# Patient Record
Sex: FEMALE | Race: WHITE | NOT HISPANIC OR LATINO | Employment: OTHER | URBAN - METROPOLITAN AREA
[De-identification: names, ages, dates, MRNs, and addresses within clinical notes are randomized per-mention and may not be internally consistent; named-entity substitution may affect disease eponyms.]

---

## 2017-01-24 ENCOUNTER — OFFICE VISIT (OUTPATIENT)
Dept: INTERNAL MEDICINE | Facility: CLINIC | Age: 62
End: 2017-01-24

## 2017-01-24 VITALS
SYSTOLIC BLOOD PRESSURE: 136 MMHG | BODY MASS INDEX: 23.3 KG/M2 | WEIGHT: 145 LBS | DIASTOLIC BLOOD PRESSURE: 82 MMHG | TEMPERATURE: 97.3 F | HEIGHT: 66 IN | RESPIRATION RATE: 16 BRPM | HEART RATE: 68 BPM

## 2017-01-24 DIAGNOSIS — R29.898 LEG WEAKNESS, BILATERAL: Primary | ICD-10-CM

## 2017-01-24 DIAGNOSIS — Z86.39 HISTORY OF HASHIMOTO THYROIDITIS: ICD-10-CM

## 2017-01-24 DIAGNOSIS — R20.2 NUMBNESS AND TINGLING IN LEFT HAND: ICD-10-CM

## 2017-01-24 DIAGNOSIS — R20.0 NUMBNESS AND TINGLING IN LEFT HAND: ICD-10-CM

## 2017-01-24 PROCEDURE — 99203 OFFICE O/P NEW LOW 30 MIN: CPT | Performed by: FAMILY MEDICINE

## 2017-01-24 RX ORDER — NAPROXEN SODIUM 220 MG
220 TABLET ORAL 2 TIMES DAILY PRN
COMMUNITY

## 2017-01-24 RX ORDER — MV-MIN/FOLIC/VIT K/LUT/HERB293 240-120MCG
TABLET ORAL DAILY
COMMUNITY
End: 2021-01-15

## 2017-01-24 RX ORDER — IBUPROFEN 200 MG
200 TABLET ORAL EVERY 6 HOURS PRN
COMMUNITY

## 2017-01-24 NOTE — PROGRESS NOTES
Subjective   Arin Lyons is a 61 y.o. female.     Chief Complaint   Patient presents with   • Extremity Weakness   • Numbness   • Osteoarthritis         History of Present Illness   Patient is a new patient who is on virtually no medicines for over-the-counter Aleve for osteoarthritis of the hands.  She is experienced over the past several months increasing feeling of weakness the legs when she gets out of a car and sometimes staggering but not vertigo.  She has had vertigo before and has not that sensation.Sensation when her legs to come along sometimes and not every day.  Having said that she actually jogs every morning with her  has no problem running.    Otherwise she's experienced some intermittent numbness and tingling in the left hand requiring her to shake her hand and this is random.  She's had no weakness in the hands.  She does have osteoarthritis of both hands and the DIP PIP joints.  She has been ripping up carpet during a home renovation project.    She'll stay care of her young grandson as well.    She has a GYN Dr. Pérez.    There is a history of Hashimoto's thyroiditis is a adolescent.      The following portions of the patient's history were reviewed and updated as appropriate: allergies, current medications, past social history and problem list.    Review of Systems   HENT: Negative.    Eyes: Negative.    Respiratory: Negative.    Cardiovascular: Negative.    Gastrointestinal: Negative.    Endocrine: Negative.    Genitourinary: Negative.    Musculoskeletal: Positive for gait problem.   Skin: Negative.    Allergic/Immunologic: Negative.    Neurological: Positive for weakness (legs).   Hematological: Negative.    Psychiatric/Behavioral: Negative.        Objective   Vitals:    01/24/17 0922   BP: 136/82   Pulse: 68   Resp: 16   Temp: 97.3 °F (36.3 °C)     Physical Exam   Constitutional: She is oriented to person, place, and time. She appears well-developed and well-nourished.   HENT:   Head:  Normocephalic and atraumatic.   Right Ear: Tympanic membrane and external ear normal.   Left Ear: Tympanic membrane and external ear normal.   Nose: Nose normal.   Mouth/Throat: Oropharynx is clear and moist.   Eyes: Conjunctivae and EOM are normal. Pupils are equal, round, and reactive to light.   Neck: Normal range of motion. Neck supple. No JVD present. No thyromegaly present.   Cardiovascular: Normal rate, regular rhythm, normal heart sounds and intact distal pulses.    Pulmonary/Chest: Effort normal and breath sounds normal.   Abdominal: Soft. Bowel sounds are normal.   Musculoskeletal: Normal range of motion.        Arms:  Lymphadenopathy:     She has no cervical adenopathy.   Neurological: She is alert and oriented to person, place, and time. No cranial nerve deficit. Coordination normal.   Skin: Skin is warm and dry. No rash noted.   Psychiatric: She has a normal mood and affect. Her behavior is normal. Judgment and thought content normal.   Vitals reviewed.      Assessment/Plan   Problem List Items Addressed This Visit     None      Visit Diagnoses     Leg weakness, bilateral    -  Primary    Relevant Orders    CBC & Differential    Comprehensive Metabolic Panel    Lipid Panel With / Chol / HDL Ratio    Vitamin B12    Folate RBC    TSH    T4, Free    T3, Free    Numbness and tingling in left hand        Relevant Orders    CBC & Differential    Comprehensive Metabolic Panel    Lipid Panel With / Chol / HDL Ratio    Vitamin B12    Folate RBC    TSH    T4, Free    T3, Free    History of Hashimoto thyroiditis        Relevant Orders    CBC & Differential    Comprehensive Metabolic Panel    Lipid Panel With / Chol / HDL Ratio    Vitamin B12    Folate RBC    TSH    T4, Free    T3, Free       plan: Screening labs including B12 folate TSH panel.  If these are normal watchful waiting or refer to neurology if symptoms progress

## 2017-01-24 NOTE — MR AVS SNAPSHOT
"Arin Lyons   1/24/2017 9:00 AM   Office Visit    Dept Phone:  [phone]   Encounter #:  [id number]    Provider:  Baljinder Carcamo Jr., MD   Department:  [facility] INTERNAL MEDICINE                Your Full Care Plan              Your Updated Medication List          This list is accurate as of: 1/24/17 10:18 AM.  Always use your most recent med list.                ALIVE WOMENS 50+ tablet       ibuprofen 200 MG tablet   Commonly known as:  ADVIL,MOTRIN       naproxen sodium 220 MG tablet   Commonly known as:  ALEVE               We Performed the Following     CBC & Differential     Comprehensive Metabolic Panel     Folate RBC     Lipid Panel With / Chol / HDL Ratio     T3, Free     T4, Free     TSH     Vitamin B12       You Were Diagnosed With        Codes Comments    Leg weakness, bilateral    -  Primary ICD-10-CM: M62.81  ICD-9-CM: 729.89     Numbness and tingling in left hand     ICD-10-CM: R20.2  ICD-9-CM: 782.0     History of Hashimoto thyroiditis     ICD-10-CM: Z86.39  ICD-9-CM: V12.29       Instructions     None    Patient Instructions History      Upcoming Appointments     Visit Type Date Time Department    NEW PATIENT 1/24/2017  9:00 AM [facility] 3 [id number]      [employer] Signup     Our records indicate that you have declined [facility] [employer] signup. If you would like to sign up for [employer], please email [email or web address] or call [phone] to obtain an activation code.             Other Info from Your Visit           Allergies     No Known Allergies      Reason for Visit     Extremity Weakness     Numbness     Osteoarthritis           Vital Signs     Blood Pressure Pulse Temperature Respirations Height Weight    136/82 (BP Location: Left arm, Patient Position: Sitting, Cuff Size: Adult) 68 97.3 °F (36.3 °C) (Tympanic) 16 66\" (167.6 cm) 145 lb (65.8 kg)    Body Mass Index Smoking Status                23.4 kg/m2 Former Smoker          Problems and " Diagnoses Noted     Leg weakness, bilateral    -  Primary    Hand paresthesia        History of Hashimoto thyroiditis

## 2017-01-25 LAB
ALBUMIN SERPL-MCNC: 4.9 G/DL (ref 3.5–5.2)
ALBUMIN/GLOB SERPL: 1.7 G/DL
ALP SERPL-CCNC: 70 U/L (ref 39–117)
ALT SERPL-CCNC: 15 U/L (ref 1–33)
AST SERPL-CCNC: 24 U/L (ref 1–32)
BASOPHILS # BLD AUTO: 0.05 10*3/MM3 (ref 0–0.2)
BASOPHILS NFR BLD AUTO: 0.9 % (ref 0–1.5)
BILIRUB SERPL-MCNC: 0.5 MG/DL (ref 0.1–1.2)
BUN SERPL-MCNC: 21 MG/DL (ref 8–23)
BUN/CREAT SERPL: 26.9 (ref 7–25)
CALCIUM SERPL-MCNC: 9.9 MG/DL (ref 8.6–10.5)
CHLORIDE SERPL-SCNC: 101 MMOL/L (ref 98–107)
CHOLEST SERPL-MCNC: 223 MG/DL (ref 0–200)
CHOLEST/HDLC SERPL: 2.48 {RATIO}
CO2 SERPL-SCNC: 25.6 MMOL/L (ref 22–29)
CREAT SERPL-MCNC: 0.78 MG/DL (ref 0.57–1)
EOSINOPHIL # BLD AUTO: 0.34 10*3/MM3 (ref 0–0.7)
EOSINOPHIL NFR BLD AUTO: 6 % (ref 0.3–6.2)
ERYTHROCYTE [DISTWIDTH] IN BLOOD BY AUTOMATED COUNT: 13.1 % (ref 11.7–13)
FOLATE BLD-MCNC: 507.7 NG/ML
FOLATE RBC-MCNC: 1183 NG/ML
GLOBULIN SER CALC-MCNC: 2.9 GM/DL
GLUCOSE SERPL-MCNC: 93 MG/DL (ref 65–99)
HCT VFR BLD AUTO: 42.9 % (ref 35.6–45.5)
HDLC SERPL-MCNC: 90 MG/DL (ref 40–60)
HGB BLD-MCNC: 13.5 G/DL (ref 11.9–15.5)
IMM GRANULOCYTES # BLD: 0 10*3/MM3 (ref 0–0.03)
IMM GRANULOCYTES NFR BLD: 0 % (ref 0–0.5)
LDLC SERPL CALC-MCNC: 116 MG/DL (ref 0–100)
LYMPHOCYTES # BLD AUTO: 1.57 10*3/MM3 (ref 0.9–4.8)
LYMPHOCYTES NFR BLD AUTO: 27.6 % (ref 19.6–45.3)
MCH RBC QN AUTO: 30.7 PG (ref 26.9–32)
MCHC RBC AUTO-ENTMCNC: 31.5 G/DL (ref 32.4–36.3)
MCV RBC AUTO: 97.5 FL (ref 80.5–98.2)
MONOCYTES # BLD AUTO: 0.42 10*3/MM3 (ref 0.2–1.2)
MONOCYTES NFR BLD AUTO: 7.4 % (ref 5–12)
NEUTROPHILS # BLD AUTO: 3.31 10*3/MM3 (ref 1.9–8.1)
NEUTROPHILS NFR BLD AUTO: 58.1 % (ref 42.7–76)
PLATELET # BLD AUTO: 298 10*3/MM3 (ref 140–500)
POTASSIUM SERPL-SCNC: 4.9 MMOL/L (ref 3.5–5.2)
PROT SERPL-MCNC: 7.8 G/DL (ref 6–8.5)
RBC # BLD AUTO: 4.4 10*6/MM3 (ref 3.9–5.2)
SODIUM SERPL-SCNC: 143 MMOL/L (ref 136–145)
T3FREE SERPL-MCNC: 3.3 PG/ML (ref 2–4.4)
T4 FREE SERPL-MCNC: 1.03 NG/DL (ref 0.93–1.7)
TRIGL SERPL-MCNC: 83 MG/DL (ref 0–150)
TSH SERPL DL<=0.005 MIU/L-ACNC: 2.5 MIU/ML (ref 0.27–4.2)
VIT B12 SERPL-MCNC: 365 PG/ML (ref 211–946)
VLDLC SERPL CALC-MCNC: 16.6 MG/DL (ref 5–40)
WBC # BLD AUTO: 5.69 10*3/MM3 (ref 4.5–10.7)

## 2017-02-02 ENCOUNTER — TELEPHONE (OUTPATIENT)
Dept: INTERNAL MEDICINE | Facility: CLINIC | Age: 62
End: 2017-02-02

## 2017-02-02 NOTE — TELEPHONE ENCOUNTER
Pt is calling for her lab results, please dictate a note to send to her or advise on what to tell the pt

## 2017-02-06 ENCOUNTER — TELEPHONE (OUTPATIENT)
Dept: INTERNAL MEDICINE | Facility: CLINIC | Age: 62
End: 2017-02-06

## 2017-02-07 NOTE — TELEPHONE ENCOUNTER
I apologize.  Her labs look good including thyroid panel.  Her cholesterol is a little high and I'll send a letter with the results.  Her folic acid and thyroid panel looks okay.

## 2017-04-25 ENCOUNTER — APPOINTMENT (OUTPATIENT)
Dept: WOMENS IMAGING | Facility: HOSPITAL | Age: 62
End: 2017-04-25

## 2017-04-25 PROCEDURE — 77067 SCR MAMMO BI INCL CAD: CPT | Performed by: RADIOLOGY

## 2017-04-25 PROCEDURE — G0202 SCR MAMMO BI INCL CAD: HCPCS | Performed by: RADIOLOGY

## 2017-06-09 ENCOUNTER — OFFICE VISIT (OUTPATIENT)
Dept: INTERNAL MEDICINE | Facility: CLINIC | Age: 62
End: 2017-06-09

## 2017-06-09 VITALS
WEIGHT: 144 LBS | OXYGEN SATURATION: 98 % | TEMPERATURE: 98.2 F | HEART RATE: 77 BPM | DIASTOLIC BLOOD PRESSURE: 88 MMHG | BODY MASS INDEX: 23.24 KG/M2 | SYSTOLIC BLOOD PRESSURE: 126 MMHG

## 2017-06-09 DIAGNOSIS — K11.1 ENLARGEMENT OF SUBMANDIBULAR GLAND: Primary | ICD-10-CM

## 2017-06-09 PROCEDURE — 99213 OFFICE O/P EST LOW 20 MIN: CPT | Performed by: FAMILY MEDICINE

## 2017-06-09 RX ORDER — AZITHROMYCIN 250 MG/1
TABLET, FILM COATED ORAL
Qty: 6 TABLET | Refills: 0 | Status: SHIPPED | OUTPATIENT
Start: 2017-06-09 | End: 2019-08-20

## 2017-06-09 NOTE — PROGRESS NOTES
Greg Lyons is a 62 y.o. female.     Chief Complaint   Patient presents with   • Neck Mass         History of Present Illness   For the past month patient stenosis swelling under the left submandibular area.  No other systemic symptoms.  She's had some sinus drainage throughout the spring.  No fever or chills.  No night sweats.  She has history of Hashimoto's thyroiditis.      The following portions of the patient's history were reviewed and updated as appropriate: allergies, current medications, past social history and problem list.    Review of Systems   Constitutional: Negative.    HENT: Positive for congestion.    Eyes: Negative.    Respiratory: Negative.    Cardiovascular: Negative.    Gastrointestinal: Negative.    Endocrine: Negative.    Genitourinary: Negative.    Musculoskeletal: Negative.    Skin: Negative.    Allergic/Immunologic: Negative.    Neurological: Negative.    Hematological: Negative.    Psychiatric/Behavioral: Negative.        Objective   Vitals:    06/09/17 1022   BP: 126/88   Pulse: 77   Temp: 98.2 °F (36.8 °C)   SpO2: 98%     Physical Exam   Constitutional: She is oriented to person, place, and time. She appears well-developed and well-nourished.   HENT:   Head: Normocephalic and atraumatic.   Right Ear: Tympanic membrane and external ear normal.   Left Ear: Tympanic membrane and external ear normal.   Nose: Nose normal.   Mouth/Throat: Oropharynx is clear and moist.   Eyes: Conjunctivae and EOM are normal. Pupils are equal, round, and reactive to light.   Neck: Normal range of motion. Neck supple. No JVD present. No thyromegaly present.       Cardiovascular: Normal rate, regular rhythm, normal heart sounds and intact distal pulses.    Pulmonary/Chest: Effort normal and breath sounds normal.   Abdominal: Soft. Bowel sounds are normal.   Musculoskeletal: Normal range of motion.   Lymphadenopathy:     She has no cervical adenopathy.   Neurological: She is alert and oriented to  person, place, and time. No cranial nerve deficit. Coordination normal.   Skin: Skin is warm and dry. No rash noted.   Psychiatric: She has a normal mood and affect. Her behavior is normal. Judgment and thought content normal.   Vitals reviewed.      Assessment/Plan   Problem List Items Addressed This Visit     None      Visit Diagnoses     Enlargement of submandibular gland    -  Primary    Relevant Orders    Ambulatory Referral to ENT (Otolaryngology)      Plan: Zithromax Z-PRACHI empirically referral to ENT for recheck.

## 2018-04-27 ENCOUNTER — APPOINTMENT (OUTPATIENT)
Dept: WOMENS IMAGING | Facility: HOSPITAL | Age: 63
End: 2018-04-27

## 2018-04-27 PROCEDURE — 77067 SCR MAMMO BI INCL CAD: CPT | Performed by: RADIOLOGY

## 2019-08-20 ENCOUNTER — RESULTS ENCOUNTER (OUTPATIENT)
Dept: FAMILY MEDICINE CLINIC | Facility: CLINIC | Age: 64
End: 2019-08-20

## 2019-08-20 ENCOUNTER — OFFICE VISIT (OUTPATIENT)
Dept: FAMILY MEDICINE CLINIC | Facility: CLINIC | Age: 64
End: 2019-08-20

## 2019-08-20 VITALS
SYSTOLIC BLOOD PRESSURE: 118 MMHG | HEIGHT: 66 IN | BODY MASS INDEX: 23.51 KG/M2 | WEIGHT: 146.3 LBS | HEART RATE: 62 BPM | OXYGEN SATURATION: 99 % | DIASTOLIC BLOOD PRESSURE: 68 MMHG | RESPIRATION RATE: 16 BRPM

## 2019-08-20 DIAGNOSIS — Z12.11 COLON CANCER SCREENING: ICD-10-CM

## 2019-08-20 DIAGNOSIS — M54.2 CERVICALGIA: ICD-10-CM

## 2019-08-20 DIAGNOSIS — Z23 NEED FOR VACCINATION: ICD-10-CM

## 2019-08-20 DIAGNOSIS — Z00.00 ANNUAL PHYSICAL EXAM: Primary | ICD-10-CM

## 2019-08-20 PROBLEM — I65.23 CAROTID STENOSIS, ASYMPTOMATIC, BILATERAL: Status: ACTIVE | Noted: 2019-08-20

## 2019-08-20 LAB
ERYTHROCYTE [DISTWIDTH] IN BLOOD BY AUTOMATED COUNT: 13.1 % (ref 12.3–15.4)
HCT VFR BLD AUTO: 42.7 % (ref 34–46.6)
HGB BLD-MCNC: 13.4 G/DL (ref 12–15.9)
MCH RBC QN AUTO: 30.7 PG (ref 26.6–33)
MCHC RBC AUTO-ENTMCNC: 31.4 G/DL (ref 31.5–35.7)
MCV RBC AUTO: 97.7 FL (ref 79–97)
PLATELET # BLD AUTO: 258 10*3/MM3 (ref 140–450)
RBC # BLD AUTO: 4.37 10*6/MM3 (ref 3.77–5.28)
TSH SERPL DL<=0.005 MIU/L-ACNC: 2.13 MIU/ML (ref 0.27–4.2)
WBC # BLD AUTO: 4.94 10*3/MM3 (ref 3.4–10.8)

## 2019-08-20 PROCEDURE — 99212 OFFICE O/P EST SF 10 MIN: CPT | Performed by: NURSE PRACTITIONER

## 2019-08-20 PROCEDURE — 90732 PPSV23 VACC 2 YRS+ SUBQ/IM: CPT | Performed by: NURSE PRACTITIONER

## 2019-08-20 PROCEDURE — 90472 IMMUNIZATION ADMIN EACH ADD: CPT | Performed by: NURSE PRACTITIONER

## 2019-08-20 PROCEDURE — 90715 TDAP VACCINE 7 YRS/> IM: CPT | Performed by: NURSE PRACTITIONER

## 2019-08-20 PROCEDURE — 90750 HZV VACC RECOMBINANT IM: CPT | Performed by: NURSE PRACTITIONER

## 2019-08-20 PROCEDURE — 90471 IMMUNIZATION ADMIN: CPT | Performed by: NURSE PRACTITIONER

## 2019-08-20 PROCEDURE — 99397 PER PM REEVAL EST PAT 65+ YR: CPT | Performed by: NURSE PRACTITIONER

## 2019-08-20 RX ORDER — CLOBETASOL PROPIONATE 0.5 MG/G
OINTMENT TOPICAL
Refills: 1 | COMMUNITY
Start: 2019-06-11 | End: 2020-08-31

## 2019-08-20 NOTE — PROGRESS NOTES
Preventive Exam    History of Present Illness: Arin Lyons is a 64 y.o. here for check up and review of routine health maintenance. she states she is doing well, but is concerned with neck pain.     Pt is being seen for neck pain at visit. She states that she was seen in [facility] in July and had a cervical neck x-ray that incidentally found carotid stenosis. She has since seen Dr. Daly, cardiologist for this. She reports that it hurts with turning her neck and is worse at night and in the AM when she wakes. She is taking aleve which helps with her neck pain.    Past medical history, surgical history and family history have been reviewed.     REVIEW OF SYSTEMS  Constitutional: Negative.    HENT: Negative.    Eyes: Negative.    Respiratory: Negative.    Cardiovascular: Negative.    Gastrointestinal: Negative.    Endocrine: Negative.    Genitourinary: Negative.    Musculoskeletal: Neck pain bilaterally.   Skin: Negative.    Allergic/Immunologic: Negative.    Neurological: Negative.    Hematological: Negative.    Psychiatric/Behavioral: Negative.      Review of Systems    PHYSICAL EXAM    Vitals:    08/20/19 0938   BP: 118/68   Pulse: 62   Resp: 16   SpO2: 99%         Physical Exam   Constitutional: She is oriented to person, place, and time. She appears well-developed and well-nourished.   HENT:   Head: Normocephalic and atraumatic.   Right Ear: External ear normal.   Left Ear: External ear normal.   Nose: Nose normal.   Mouth/Throat: Oropharynx is clear and moist.   Eyes: Conjunctivae and EOM are normal. Pupils are equal, round, and reactive to light.   Neck: Normal range of motion. Neck supple. No thyromegaly present.       Cardiovascular: Normal rate, regular rhythm, normal heart sounds and intact distal pulses.   No murmur heard.  Pulmonary/Chest: Effort normal and breath sounds normal.   Abdominal: Soft. Bowel sounds are normal. She exhibits no distension. There is no tenderness.   Musculoskeletal: Normal range of  motion. She exhibits no edema or deformity.   Lymphadenopathy:     She has no cervical adenopathy.   Neurological: She is alert and oriented to person, place, and time. No cranial nerve deficit.   Skin: Skin is warm and dry.   Psychiatric: She has a normal mood and affect. Her behavior is normal. Judgment and thought content normal.   Nursing note and vitals reviewed.      Procedures    Arin was seen today for annual exam.    Diagnoses and all orders for this visit:    Annual physical exam  -     CBC (No Diff)  -     Comprehensive Metabolic Panel  -     Lipid Panel With / Chol / HDL Ratio  -     TSH    Need for vaccination  -     Tdap Vaccine Greater Than or Equal To 6yo IM  -     Pneumococcal Polysaccharide Vaccine 23-Valent Greater Than or Equal To 3yo Subcutaneous / IM  -     Shingrix Vaccine    Colon cancer screening  -     Cologuard - Stool, Per Rectum; Future    Cervicalgia        Problems Addressed this Visit     None      Visit Diagnoses     Annual physical exam    -  Primary    Relevant Orders    CBC (No Diff)    Comprehensive Metabolic Panel    Lipid Panel With / Chol / HDL Ratio    TSH    Need for vaccination        Relevant Orders    Tdap Vaccine Greater Than or Equal To 6yo IM    Pneumococcal Polysaccharide Vaccine 23-Valent Greater Than or Equal To 3yo Subcutaneous / IM    Shingrix Vaccine    Colon cancer screening        Relevant Orders    Cologuard - Stool, Per Rectum    Cervicalgia     Continue Alleve as needed for pain.            Routine health maintenance reviewed and discussed with Arin Lyons.    Discussed importance of preventative care with patient. Counseled on diet and exercise.     Follow-up in 1 year for physical or as needed.

## 2019-08-21 LAB
ALBUMIN SERPL-MCNC: 4.5 G/DL (ref 3.5–5.2)
ALBUMIN/GLOB SERPL: 1.9 G/DL
ALP SERPL-CCNC: 66 U/L (ref 39–117)
ALT SERPL-CCNC: 14 U/L (ref 1–33)
AST SERPL-CCNC: 22 U/L (ref 1–32)
BILIRUB SERPL-MCNC: 0.5 MG/DL (ref 0.2–1.2)
BUN SERPL-MCNC: 14 MG/DL (ref 8–23)
BUN/CREAT SERPL: 21.9 (ref 7–25)
CALCIUM SERPL-MCNC: 9 MG/DL (ref 8.6–10.5)
CHLORIDE SERPL-SCNC: 102 MMOL/L (ref 98–107)
CHOLEST SERPL-MCNC: 205 MG/DL (ref 0–200)
CHOLEST/HDLC SERPL: 2.73 {RATIO}
CO2 SERPL-SCNC: 28.6 MMOL/L (ref 22–29)
CREAT SERPL-MCNC: 0.64 MG/DL (ref 0.57–1)
GLOBULIN SER CALC-MCNC: 2.4 GM/DL
GLUCOSE SERPL-MCNC: 86 MG/DL (ref 65–99)
HCV AB S/CO SERPL IA: <0.1 S/CO RATIO (ref 0–0.9)
HDLC SERPL-MCNC: 75 MG/DL (ref 40–60)
LDLC SERPL CALC-MCNC: 117 MG/DL (ref 0–100)
POTASSIUM SERPL-SCNC: 5.1 MMOL/L (ref 3.5–5.2)
PROT SERPL-MCNC: 6.9 G/DL (ref 6–8.5)
SODIUM SERPL-SCNC: 141 MMOL/L (ref 136–145)
TRIGL SERPL-MCNC: 63 MG/DL (ref 0–150)
VLDLC SERPL CALC-MCNC: 12.6 MG/DL

## 2019-09-05 ENCOUNTER — TELEPHONE (OUTPATIENT)
Dept: FAMILY MEDICINE CLINIC | Facility: CLINIC | Age: 64
End: 2019-09-05

## 2019-09-05 DIAGNOSIS — R19.5 POSITIVE COLORECTAL CANCER SCREENING USING COLOGUARD TEST: Primary | ICD-10-CM

## 2019-11-06 ENCOUNTER — AMBULATORY SURGICAL CENTER (AMBULATORY)
Dept: URBAN - METROPOLITAN AREA SURGERY 17 | Facility: SURGERY | Age: 64
End: 2019-11-06
Payer: COMMERCIAL

## 2019-11-06 VITALS
WEIGHT: 145 LBS | OXYGEN SATURATION: 98 % | DIASTOLIC BLOOD PRESSURE: 57 MMHG | OXYGEN SATURATION: 100 % | OXYGEN SATURATION: 97 % | SYSTOLIC BLOOD PRESSURE: 138 MMHG | DIASTOLIC BLOOD PRESSURE: 65 MMHG | DIASTOLIC BLOOD PRESSURE: 62 MMHG | DIASTOLIC BLOOD PRESSURE: 82 MMHG | SYSTOLIC BLOOD PRESSURE: 100 MMHG | DIASTOLIC BLOOD PRESSURE: 94 MMHG | SYSTOLIC BLOOD PRESSURE: 144 MMHG | HEART RATE: 71 BPM | RESPIRATION RATE: 21 BRPM | TEMPERATURE: 97.9 F | SYSTOLIC BLOOD PRESSURE: 118 MMHG | HEIGHT: 66 IN | RESPIRATION RATE: 16 BRPM | RESPIRATION RATE: 14 BRPM | DIASTOLIC BLOOD PRESSURE: 58 MMHG | RESPIRATION RATE: 18 BRPM | DIASTOLIC BLOOD PRESSURE: 77 MMHG | RESPIRATION RATE: 15 BRPM | DIASTOLIC BLOOD PRESSURE: 72 MMHG | HEART RATE: 77 BPM | HEART RATE: 66 BPM | SYSTOLIC BLOOD PRESSURE: 104 MMHG | SYSTOLIC BLOOD PRESSURE: 119 MMHG | HEART RATE: 75 BPM | OXYGEN SATURATION: 99 % | HEART RATE: 76 BPM | TEMPERATURE: 97.4 F | HEART RATE: 73 BPM | SYSTOLIC BLOOD PRESSURE: 108 MMHG | SYSTOLIC BLOOD PRESSURE: 131 MMHG | HEART RATE: 70 BPM | RESPIRATION RATE: 20 BRPM

## 2019-11-06 DIAGNOSIS — R19.5 OTHER FECAL ABNORMALITIES: ICD-10-CM

## 2019-11-06 DIAGNOSIS — Z12.11 ENCOUNTER FOR SCREENING FOR MALIGNANT NEOPLASM OF COLON: ICD-10-CM

## 2019-11-06 PROCEDURE — 45378 DIAGNOSTIC COLONOSCOPY: CPT | Performed by: INTERNAL MEDICINE

## 2019-11-22 ENCOUNTER — CLINICAL SUPPORT (OUTPATIENT)
Dept: FAMILY MEDICINE CLINIC | Facility: CLINIC | Age: 64
End: 2019-11-22

## 2019-11-22 DIAGNOSIS — Z23 NEED FOR VACCINATION: Primary | ICD-10-CM

## 2019-11-22 PROCEDURE — 90471 IMMUNIZATION ADMIN: CPT | Performed by: NURSE PRACTITIONER

## 2019-11-22 PROCEDURE — 90750 HZV VACC RECOMBINANT IM: CPT | Performed by: NURSE PRACTITIONER

## 2020-01-28 ENCOUNTER — APPOINTMENT (OUTPATIENT)
Dept: WOMENS IMAGING | Facility: HOSPITAL | Age: 65
End: 2020-01-28

## 2020-01-28 PROCEDURE — 77067 SCR MAMMO BI INCL CAD: CPT | Performed by: RADIOLOGY

## 2020-03-18 ENCOUNTER — APPOINTMENT (OUTPATIENT)
Dept: WOMENS IMAGING | Facility: HOSPITAL | Age: 65
End: 2020-03-18

## 2020-03-18 PROCEDURE — 76641 ULTRASOUND BREAST COMPLETE: CPT | Performed by: RADIOLOGY

## 2020-08-31 ENCOUNTER — OFFICE VISIT (OUTPATIENT)
Dept: ORTHOPEDIC SURGERY | Facility: CLINIC | Age: 65
End: 2020-08-31

## 2020-08-31 VITALS — TEMPERATURE: 98 F | BODY MASS INDEX: 23.78 KG/M2 | WEIGHT: 148 LBS | HEIGHT: 66 IN

## 2020-08-31 DIAGNOSIS — R52 PAIN: Primary | ICD-10-CM

## 2020-08-31 DIAGNOSIS — S92.334A CLOSED NONDISPLACED FRACTURE OF THIRD METATARSAL BONE OF RIGHT FOOT, INITIAL ENCOUNTER: ICD-10-CM

## 2020-08-31 PROCEDURE — 99203 OFFICE O/P NEW LOW 30 MIN: CPT | Performed by: NURSE PRACTITIONER

## 2020-08-31 PROCEDURE — 73630 X-RAY EXAM OF FOOT: CPT | Performed by: NURSE PRACTITIONER

## 2020-08-31 NOTE — PROGRESS NOTES
Patient Name: Arin Lyons   YOB: 1955  Referring Primary Care Physician: Kylee Sanchez APRN  BMI: Body mass index is 23.9 kg/m².    Chief Complaint:    Chief Complaint   Patient presents with   • Right Foot - Pain, Initial Evaluation        HPI: New patient to me she has neighbors of Candace Finley comes in for right foot pain.  She thinks around July 12 she had pain at the top of her foot thought it was a truck stress fracture she has had these in the past.  She denies any history of osteopenia osteoporosis but does not know she has been tested she runs about 20 miles a week and continues to have pain at the top of her foot ongoing for 2 months she is switched over to a postop shoe and has avoided running but has been walking and not stopped exercising presents today with concerns regarding the persistent pain.  Mask were worn by everyone for the duration of the visit she denies any acute trauma or injury to the foot.    Arin Lyons is a 65 y.o. female who presents today for evaluation of   Chief Complaint   Patient presents with   • Right Foot - Pain, Initial Evaluation       This problem is new to this examiner.     Subjective   Medications:   Home Medications:  Current Outpatient Medications on File Prior to Visit   Medication Sig   • ibuprofen (ADVIL,MOTRIN) 200 MG tablet Take 200 mg by mouth Every 6 (Six) Hours As Needed for mild pain (1-3).   • Multiple Vitamins-Minerals (ALIVE WOMENS 50+) tablet Take  by mouth Daily.   • naproxen sodium (ALEVE) 220 MG tablet Take 220 mg by mouth 2 (Two) Times a Day As Needed for mild pain (1-3).   • [DISCONTINUED] clobetasol (TEMOVATE) 0.05 % ointment APPLY A THIN LAYER TOPICALLY TO AFFECTED AREA TWICE DAILY     No current facility-administered medications on file prior to visit.      Current Medications:  Scheduled Meds:  Continuous Infusions:  No current facility-administered medications for this visit.   PRN Meds:.    I have reviewed the patient's  "medical history in detail and updated the computerized patient record.  Review and summarization of old records includes:    No past medical history on file.     Past Surgical History:   Procedure Laterality Date   • SKIN CANCER EXCISION Right 1990        Social History     Occupational History   • Not on file   Tobacco Use   • Smoking status: Former Smoker   • Smokeless tobacco: Never Used   Substance and Sexual Activity   • Alcohol use: Yes     Comment: 6 a week   • Drug use: No   • Sexual activity: Yes     Partners: Male      Social History     Social History Narrative    Lives with . Two grown daughters.         Family History   Problem Relation Age of Onset   • Hypertension Mother    • Depression Mother    • Stroke Father    • Rheum arthritis Maternal Aunt    • Heart failure Maternal Grandmother    • Thyroid disease Maternal Grandmother    • Autoimmune disease Sister    • Alzheimer's disease Sister        ROS: 14 point review of systems was performed and all other systems were reviewed and are negative except for documented findings in HPI and today's encounter.     Allergies: No Known Allergies  Constitutional:  Denies fever, shaking or chills   Eyes:  Denies change in visual acuity   HENT:  Denies nasal congestion or sore throat   Respiratory:  Denies cough or shortness of breath   Cardiovascular:  Denies chest pain or severe LE edema   GI:  Denies abdominal pain, nausea, vomiting, bloody stools or diarrhea   Musculoskeletal:  Numbness, tingling, pain, or loss of motor function only as noted above in history of present illness.  : Denies painful urination or hematuria  Integument:  Denies rash, lesion or ulceration   Neurologic:  Denies headache or focal weakness  Endocrine:  Denies lymphadenopathy  Psych:  Denies confusion or change in mental status   Hem:  Denies active bleeding    OBJECTIVE:  Physical Exam:   Temp 98 °F (36.7 °C) (Temporal)   Ht 167.6 cm (65.98\")   Wt 67.1 kg (148 lb)   BMI " 23.90 kg/m²     General Appearance:    Alert, cooperative, in no acute distress                  Eyes: conjunctiva clear  ENT: external ears and nose atraumatic  CV: no peripheral edema  Resp: normal respiratory effort  Skin: no rashes or wounds; normal turgor  Psych: mood and affect appropriate  Lymph: no nodes appreciated  Neuro: gross sensation intact  Vascular:  Palpable peripheral pulse in noted extremity  Musculoskeletal Extremities: Skin is warm and dry intact with good pulses mood and sensation she is point tender over the top of her foot at the third metatarsal bone ankles nontender Achilles intact there is no ecchymosis slight effusion noted she ambulates with a slight antalgic gait.    Radiology:   Right foot x-rays 2 views were done for acute pain that show healing fracture of the third metatarsal with some bony osseous changes questionable date and age no comparison films readily available    Assessment:     ICD-10-CM ICD-9-CM   1. Pain R52 780.96   2. Closed nondisplaced fracture of third metatarsal bone of right foot, initial encounter S92.334A 825.25        Procedures  With her history of stress fractures in the past and unknown certainty of how this happened and continued pain for 6 to 7 weeks and like to obtain an MRI to further evaluate her foot to see if this could possibly be a nonunion  Fitted with a short fracture walker boot and will get an MRI  Plan: Biomechanics of pertinent body area discussed.  Risks, benefits, alternatives, comparisons, and complications of accepted medicines, injections, recommendations, surgical procedures, and therapies explained and education provided in laymen's terms. Natural history and expected course of this patient's diagnosis discussed along with evaluation of therapies. Questions answered. When appropriate I also discussed proper use of cane, walker, trekking poles.   MEDICATIONS:  Prescription, OTC and Monitoring of Medications per orders to address ortho  complaints; Evaluation and discussion of safety, precautions, side effects, and warnings given especially of long term NSAID or steroid therapy.    RICE: Rest, ice, compression, and elevation therapy, Cryotherapy/brachy therapy, and or OTC linaments as indicated with instructions.       8/31/2020    Much of this encounter note is an electronic transcription/translation of spoken language to printed text. The electronic translation of spoken language may permit erroneous, or at times, nonsensical words or phrases to be inadvertently transcribed; Although I have reviewed the note for such errors, some may still exist

## 2020-09-14 ENCOUNTER — TELEPHONE (OUTPATIENT)
Dept: ORTHOPEDIC SURGERY | Facility: CLINIC | Age: 65
End: 2020-09-14

## 2020-09-14 NOTE — TELEPHONE ENCOUNTER
Patient called back and was informed of her results; she was transferred to scheduling for an appt with RUBEN.

## 2020-09-14 NOTE — TELEPHONE ENCOUNTER
Spoke to patient & scheduled her to see MWM next Thurs 9/24 (after patient returns from out of town) for IHC per [facility] / RIGHT Foot 2nd-3rd MT FX / pain since 7/12/20 / XR 8/31, MRI 9/09/20 ([facility]). Patient verbalized understanding.

## 2020-09-14 NOTE — TELEPHONE ENCOUNTER
----- Message from NARCISA Lunsford sent at 9/14/2020  1:16 PM EDT -----  Pt has 3rd metatarsal fracture - and second metatarsal stress fracture - needs to stay off of her foot in boot and follow with MWM - I think she has a knee scooter and needs to use to stay off. cim

## 2020-09-24 ENCOUNTER — CONSULT (OUTPATIENT)
Dept: ORTHOPEDIC SURGERY | Facility: CLINIC | Age: 65
End: 2020-09-24

## 2020-09-24 ENCOUNTER — TELEPHONE (OUTPATIENT)
Dept: ORTHOPEDIC SURGERY | Facility: CLINIC | Age: 65
End: 2020-09-24

## 2020-09-24 ENCOUNTER — LAB (OUTPATIENT)
Dept: LAB | Facility: HOSPITAL | Age: 65
End: 2020-09-24

## 2020-09-24 VITALS — TEMPERATURE: 97 F | HEIGHT: 67 IN | WEIGHT: 150 LBS | BODY MASS INDEX: 23.54 KG/M2

## 2020-09-24 DIAGNOSIS — S92.334A CLOSED NONDISPLACED FRACTURE OF THIRD METATARSAL BONE OF RIGHT FOOT, INITIAL ENCOUNTER: ICD-10-CM

## 2020-09-24 DIAGNOSIS — M19.079 ARTHRITIS OF FOOT: ICD-10-CM

## 2020-09-24 DIAGNOSIS — E55.9 VITAMIN D DEFICIENCY: Primary | ICD-10-CM

## 2020-09-24 DIAGNOSIS — S92.324A CLOSED NONDISPLACED FRACTURE OF SECOND METATARSAL BONE OF RIGHT FOOT, INITIAL ENCOUNTER: ICD-10-CM

## 2020-09-24 DIAGNOSIS — E55.9 VITAMIN D DEFICIENCY: ICD-10-CM

## 2020-09-24 LAB — 25(OH)D3 SERPL-MCNC: 34.9 NG/ML (ref 30–100)

## 2020-09-24 PROCEDURE — 36415 COLL VENOUS BLD VENIPUNCTURE: CPT

## 2020-09-24 PROCEDURE — 73630 X-RAY EXAM OF FOOT: CPT | Performed by: ORTHOPAEDIC SURGERY

## 2020-09-24 PROCEDURE — 82306 VITAMIN D 25 HYDROXY: CPT

## 2020-09-24 PROCEDURE — 99214 OFFICE O/P EST MOD 30 MIN: CPT | Performed by: ORTHOPAEDIC SURGERY

## 2020-09-24 NOTE — TELEPHONE ENCOUNTER
----- Message from Allan Glaser MD sent at 9/24/2020 11:14 AM EDT -----  Please make sure she is taking at least 5000 units of vitamin D daily and recheck in 4 weeks.

## 2020-09-24 NOTE — TELEPHONE ENCOUNTER
Patient returned my call.  We discussed her vitamin D level.  She is taking a multivitamin that has some vitamin D.  Have advised her that she will need to begin taking vitamin D3 5000 units every day and will recheck her vitamin D level in 1 month per Dr. Glaser

## 2020-09-24 NOTE — PROGRESS NOTES
"Foot Follow Up      Patient: Arin Lyons    YOB: 1955 65 y.o. female    Chief Complaints: Foot hurts    History of Present Illness: Patient had onset of right foot pain in mid July.  She been running up to 20 miles a week prior to that.    She began having some pain in her foot that felt like a stress fracture she has had these previously.  She quit running and began doing some walking with continued pain and got herself back into a postoperative shoe that she already had.    She had persistent complaints of pain and saw Cheryle on 8/31/2020 and was fitted with a boot and is only been using a cane intermittently since then and was sent for MRI.    She is seen today with some improvement in her foot pain with mild intermittent aching pain in the right forefoot.    She does take a women's multivitamin but does not take any supplemental calcium or vitamin D.            HPI    ROS: Foot pain activity change, ringing in the ears, neck pain, joint and back pain, dizziness  History reviewed. No pertinent past medical history.  Physical Exam:   Vitals:    09/24/20 0833   Temp: 97 °F (36.1 °C)   TempSrc: Temporal   Weight: 68 kg (150 lb)   Height: 168.9 cm (66.5\")   PainSc:   2   PainLoc: Foot     Well developed with normal mood.  Right foot shows slight swelling no warmth erythema there is mild discomfort to palpation on the second and third metatarsals and minimal if any discomfort of the dorsum of the midfoot      Radiology: 3 views of the right foot ordered today to evaluate for fractures reviewed and compared with x-rays from 8/31/2020 there are nondisplaced fractures of the second and third metatarsal necks proximal to the head without appreciable angulation or translation.  There is osteopenia and compared with prior x-rays there is more evident second metatarsal fracture than was previous Delmer noted.    MRI report of the right foot dated 9/8/2020 from [facility] shows nondisplaced transverse oblique " fracture of the distal shaft of the third metatarsal with marrow edema and also a nondisplaced incomplete stress fracture of the second metatarsal.  Otherwise there is no bony abnormality or marrow signal noted Lisfranc ligament is intact there is mild subcutaneous edema.      Assessment/Plan: 1.  Right second and third metatarsal stress fractures  2.  Right midfoot arthritis    Reviewed treatment options with her today and given the alignment of these and her improvement in pain I would not recommend any surgical treatment at this time but she understands that should these fail to heal or displace the could require surgical treatment.    She will continue with her boot and I recommend that she offload this with a cane which she already has and she will continue to limit activities to that of daily living only.    We need to get a vitamin D level checked on her as this may be low and could need to be augmented to help with assist in healing these fractures.    If anything worsens she will let me know otherwise I will see her back in 3 weeks x-ray of her right foot.    Recommend the patient see primary care provider for issues outlined in review of symptoms not pertinent to current orthopedic complaints

## 2020-10-14 ENCOUNTER — OFFICE VISIT (OUTPATIENT)
Dept: ORTHOPEDIC SURGERY | Facility: CLINIC | Age: 65
End: 2020-10-14

## 2020-10-14 VITALS — BODY MASS INDEX: 22.76 KG/M2 | HEIGHT: 67 IN | TEMPERATURE: 96.4 F | WEIGHT: 145 LBS

## 2020-10-14 DIAGNOSIS — S92.334D CLOSED NONDISPLACED FRACTURE OF THIRD METATARSAL BONE OF RIGHT FOOT WITH ROUTINE HEALING, SUBSEQUENT ENCOUNTER: ICD-10-CM

## 2020-10-14 DIAGNOSIS — R52 PAIN: Primary | ICD-10-CM

## 2020-10-14 DIAGNOSIS — S92.324D CLOSED NONDISPLACED FRACTURE OF SECOND METATARSAL BONE OF RIGHT FOOT WITH ROUTINE HEALING, SUBSEQUENT ENCOUNTER: ICD-10-CM

## 2020-10-14 DIAGNOSIS — M19.079 ARTHRITIS OF FOOT: ICD-10-CM

## 2020-10-14 PROCEDURE — 99213 OFFICE O/P EST LOW 20 MIN: CPT | Performed by: ORTHOPAEDIC SURGERY

## 2020-10-14 PROCEDURE — 73630 X-RAY EXAM OF FOOT: CPT | Performed by: ORTHOPAEDIC SURGERY

## 2020-10-14 RX ORDER — ERGOCALCIFEROL 1.25 MG/1
50000 CAPSULE ORAL WEEKLY
COMMUNITY
End: 2021-01-15

## 2020-10-14 NOTE — PROGRESS NOTES
"Foot Follow Up      Patient: Arin Lyons    YOB: 1955 65 y.o. female    Chief Complaints: Foot feels a little better    History of Present Illness: Patient had onset of right foot pain in mid July after running up to 20 miles a week prior to that.    She had begun having some pain in her foot that felt like a stress fracture and had them previously quit running and began doing some walking with continued pain and subsequently saw Cheryle on 8/31/2020 was fitted with a boot and cane and had an MRI.    I saw her for this on 9/24/2020 and she has continued to use her boot and cane pain has improved to some degree still hurts mainly at night and if she is \"on it more than she should be\".    Mild aching pain in the dorsum of the right forefoot.  HPI    ROS: Foot pain  History reviewed. No pertinent past medical history.  Physical Exam:   Vitals:    10/14/20 1056   Temp: 96.4 °F (35.8 °C)   Weight: 65.8 kg (145 lb)   Height: 168.9 cm (66.5\")   PainSc:   3     Well developed with normal mood.  Right foot shows mild discomfort on the dorsum of the forefoot more so than midfoot but no warmth erythema only slight swelling.  Neutral dorsiflexion of the heel cord      Radiology: 3 views of the right foot ordered evaluate fracture alignment reviewed and compared with previous x-rays the fractures of the second and third metatarsals appear to be healing without change in alignment there is still some osteopenia but does appear to be some callus formation.    Vitamin D 9/24/2020 34.9      Assessment/Plan:  1.  Right second and third metatarsal stress fractures  2.  Right midfoot arthritis    Reviewed x-ray findings with her nothing I would recommend from a surgical standpoint at this time and this may take several months or longer to improve.    She is on vitamin D 5000 units daily and understands to have her level rechecked around 10/24/2020.    However continue with her boot and cane for least another 2 weeks and " if she is improving may then start trying to transition to a postoperative shoe which she already has from previous stress fractures and use her cane.    I recommend heel cord stretching exercises to do at least 4 times a day.  Instruction sheet was provided and demonstrated for the patient. We discussed etiology of heel cord tightness to midfoot and forefoot overload.      We will see her back in 3 weeks x-rays of her right foot.

## 2020-10-22 ENCOUNTER — LAB (OUTPATIENT)
Dept: LAB | Facility: HOSPITAL | Age: 65
End: 2020-10-22

## 2020-10-22 DIAGNOSIS — E55.9 VITAMIN D DEFICIENCY: ICD-10-CM

## 2020-10-22 LAB — 25(OH)D3 SERPL-MCNC: 49.7 NG/ML (ref 30–100)

## 2020-10-22 PROCEDURE — 82306 VITAMIN D 25 HYDROXY: CPT

## 2020-10-22 PROCEDURE — 36415 COLL VENOUS BLD VENIPUNCTURE: CPT

## 2020-10-23 ENCOUNTER — TELEPHONE (OUTPATIENT)
Dept: ORTHOPEDIC SURGERY | Facility: CLINIC | Age: 65
End: 2020-10-23

## 2020-10-23 DIAGNOSIS — E55.9 VITAMIN D DEFICIENCY: Primary | ICD-10-CM

## 2020-10-23 NOTE — TELEPHONE ENCOUNTER
Serum vitamin D level is 49.7.  Have left a message for the patient to continue her vitamin D at 5000 units daily and will recheck her serum vitamin D level in 4 weeks.  Have left it open for her to call if she has any questions or concerns per Dr. Glaser

## 2020-10-23 NOTE — TELEPHONE ENCOUNTER
----- Message from Allan Glaser MD sent at 10/22/2020  1:51 PM EDT -----  Please have her continue with vitamin D 5000 use daily and recheck in 4 weeks.  Thank you

## 2020-11-05 ENCOUNTER — OFFICE VISIT (OUTPATIENT)
Dept: ORTHOPEDIC SURGERY | Facility: CLINIC | Age: 65
End: 2020-11-05

## 2020-11-05 VITALS — WEIGHT: 145 LBS | BODY MASS INDEX: 22.76 KG/M2 | HEIGHT: 67 IN | TEMPERATURE: 96.8 F

## 2020-11-05 DIAGNOSIS — R52 PAIN: Primary | ICD-10-CM

## 2020-11-05 DIAGNOSIS — S92.324D CLOSED NONDISPLACED FRACTURE OF SECOND METATARSAL BONE OF RIGHT FOOT WITH ROUTINE HEALING, SUBSEQUENT ENCOUNTER: ICD-10-CM

## 2020-11-05 DIAGNOSIS — E55.9 VITAMIN D DEFICIENCY: ICD-10-CM

## 2020-11-05 DIAGNOSIS — S92.334D CLOSED NONDISPLACED FRACTURE OF THIRD METATARSAL BONE OF RIGHT FOOT WITH ROUTINE HEALING, SUBSEQUENT ENCOUNTER: ICD-10-CM

## 2020-11-05 PROCEDURE — 73630 X-RAY EXAM OF FOOT: CPT | Performed by: ORTHOPAEDIC SURGERY

## 2020-11-05 PROCEDURE — 99213 OFFICE O/P EST LOW 20 MIN: CPT | Performed by: ORTHOPAEDIC SURGERY

## 2020-11-05 NOTE — PROGRESS NOTES
"Foot Follow Up      Patient: Arin Lyons    YOB: 1955 65 y.o. female    Chief Complaints: Foot getting better    History of Present Illness:Patient had onset of right foot pain in mid July after running up to 20 miles a week prior to that.     She had begun having some pain in her foot that felt like a stress fracture and had them previously quit running and began doing some walking with continued pain and subsequently saw Cheryle on 8/31/2020 was fitted with a boot and cane and had an MRI.     I saw her for this on initially on 9/24/2020 and was last seen on 10/14/2020 which time she says she been \"on it more than she should be\" had mild aching pain in the right forefoot.    She was instructed on continuing with her boot and cane for least another 2 weeks then start transitioning to a postoperative shoe and continue use of cane.    She said she is feeling better and does not really hurt but gets some discomfort with toe flexion she been going barefoot in the mornings and in the evening some and says it \"does not really hurt\".  She has been doing some heel cord stretching exercises.      HPI    ROS: Foot pain  History reviewed. No pertinent past medical history.  Physical Exam:   Vitals:    11/05/20 0947   Temp: 96.8 °F (36 °C)   Weight: 65.8 kg (145 lb)   Height: 168.9 cm (66.5\")     Well developed with normal mood.  On exam she had minimal if any discomfort with dorsum of the forefoot she did have some discomfort exacerbated with flexion extension of the toes.  Nontender over the dorsum of the midfoot      Radiology: 3 views of the right foot ordered evaluate fracture alignment reviewed and compared with previous x-rays.  Fractures of the second and third metatarsals appear to be healing with some increased callus compared with previous x-rays and no appreciable displacement.    Vitamin D 10/22/2020 49.7 up from 34.9 on 9/24/2020      Assessment/Plan:   1.  Right second and third metatarsal stress " fractures  2.  Right midfoot arthritis    We discussed treatment options nothing I would recommend from a surgical standpoint.  We will have her continue with heel cord stretching exercises and reviewed intrinsic stretching and strengthening exercises for her to do for her toes and instruction she was provided.    I have her start weaning out of her postoperative shoe into a sturdy well cushioned athletic shoe around the house for the next 2 weeks and use the postop shoe out of the house and use her cane if needed.    Counseled her to avoid barefoot ambulation.    She can continue with vitamin D 5000 units daily and get this checked again in about 2 weeks.  We will see her back at that time with x-rays of her right foot as she is leaving soon thereafter to go to [facility] with her  who is requiring some procedures done at the [facility] there and will be back until late December.

## 2020-11-18 ENCOUNTER — LAB (OUTPATIENT)
Dept: LAB | Facility: HOSPITAL | Age: 65
End: 2020-11-18

## 2020-11-18 DIAGNOSIS — E55.9 VITAMIN D DEFICIENCY: ICD-10-CM

## 2020-11-18 LAB — 25(OH)D3 SERPL-MCNC: 49 NG/ML (ref 30–100)

## 2020-11-18 PROCEDURE — 82306 VITAMIN D 25 HYDROXY: CPT

## 2020-11-18 PROCEDURE — 36415 COLL VENOUS BLD VENIPUNCTURE: CPT

## 2020-11-19 ENCOUNTER — OFFICE VISIT (OUTPATIENT)
Dept: ORTHOPEDIC SURGERY | Facility: CLINIC | Age: 65
End: 2020-11-19

## 2020-11-19 VITALS — HEIGHT: 67 IN | BODY MASS INDEX: 22.76 KG/M2 | WEIGHT: 145 LBS | TEMPERATURE: 96.4 F

## 2020-11-19 DIAGNOSIS — M72.2 PLANTAR FASCIITIS: ICD-10-CM

## 2020-11-19 DIAGNOSIS — S92.324D CLOSED NONDISPLACED FRACTURE OF SECOND METATARSAL BONE OF RIGHT FOOT WITH ROUTINE HEALING, SUBSEQUENT ENCOUNTER: ICD-10-CM

## 2020-11-19 DIAGNOSIS — S92.334D CLOSED NONDISPLACED FRACTURE OF THIRD METATARSAL BONE OF RIGHT FOOT WITH ROUTINE HEALING, SUBSEQUENT ENCOUNTER: ICD-10-CM

## 2020-11-19 DIAGNOSIS — E55.9 VITAMIN D DEFICIENCY: ICD-10-CM

## 2020-11-19 DIAGNOSIS — M19.079 ARTHRITIS OF FOOT: ICD-10-CM

## 2020-11-19 DIAGNOSIS — R52 PAIN: Primary | ICD-10-CM

## 2020-11-19 PROCEDURE — 73630 X-RAY EXAM OF FOOT: CPT | Performed by: ORTHOPAEDIC SURGERY

## 2020-11-19 PROCEDURE — 99213 OFFICE O/P EST LOW 20 MIN: CPT | Performed by: ORTHOPAEDIC SURGERY

## 2020-11-19 NOTE — PROGRESS NOTES
"Foot Follow Up      Patient: Arin Lyons    YOB: 1955 65 y.o. female    Chief Complaints: Foot feeling a little better    History of Present Illness:Patient had onset of right foot pain in mid July after running up to 20 miles a week prior to that.     She had begun having some pain in her foot that felt like a stress fracture and had them previously quit running and began doing some walking with continued pain and subsequently saw Cheryle on 8/31/2020 was fitted with a boot and cane and had an MRI.     I saw her for this on initially on 9/24/2020 and was last seen on 10/14/2020 which time she says she been \"on it more than she should be\" had mild aching pain in the right forefoot.     She was instructed on continuing with her boot and cane for least another 2 weeks then start transitioning to a postoperative shoe and continue use of cane.    She was last seen on 11/5/2020 which time her foot was feeling better and did not really hurt much other than some discomfort with toe flexion and may going barefoot in the mornings and some of the evenings and it \"did not really hurt\".    She has since weaned out of her postoperative shoe into sturdy athletic shoes and states that her foot is little bit better still gets a little bit of discomfort in her toes when she flexes them and has had in the last week or 2 development of some intermittent achiness in the arch       HPI    ROS: Foot pain  History reviewed. No pertinent past medical history.  Physical Exam:   Vitals:    11/19/20 1407   Temp: 96.4 °F (35.8 °C)   Weight: 65.8 kg (145 lb)   Height: 168.9 cm (66.5\")   PainSc:   2     Well developed with normal mood.  Right foot showed really no focal tenderness along the second or third metatarsals and no pain around the first MTP joint.  There is some slight discomfort to the plantar fascia area in the mid arch but no exacerbation of pain with flexion extension of the toes.  No pain with medial lateral " calcaneal compression      Radiology: 3 views of the right foot ordered evaluate fracture alignment 3 views the right foot ordered evaluate fracture alignment reviewed and compared to previous x-rays.  Fractures of the second and third metatarsals appear to be healing no obvious change in alignment as far as any translation or angulation.  Does have some arthritic change at the midfoot.    Vitamin D 11/18/2020 49 down slightly from 49.7 on 10/21/2020    Assessment/Plan:  1.  Right second and third metatarsal stress fractures  2.  Right midfoot arthritis  3.  Right arch pain consistent with possible plantar fascial strain or exacerbation of the midfoot arthritis.    Clinically says seem to be improving albeit slowly and nothing I would recommend as far as further work-up or imaging at this time.    She will continue with accommodative shoes with power step orthotic and may advance activity very slowly as tolerated.    Also demonstrated heel cord stretching exercises to do least 4 times a day instruction she was provided and demonstrated for her as were intrinsic stretching and strengthening exercises for the toes.  The heel cord should help with the plantar pain.    She declined any formal therapy at this time and is leaving for [place] soon to the end of December where her  is receiving medical treatment.    We will see her back in about 8 weeks after she returns but if she has any recurrence of pain in the interim should get back into her boot or postoperative shoe.  We will x-ray her right foot at follow-up

## 2020-11-20 ENCOUNTER — TELEPHONE (OUTPATIENT)
Dept: ORTHOPEDIC SURGERY | Facility: CLINIC | Age: 65
End: 2020-11-20

## 2020-11-20 DIAGNOSIS — E55.9 VITAMIN D DEFICIENCY: Primary | ICD-10-CM

## 2020-11-20 NOTE — TELEPHONE ENCOUNTER
Serum vitamin D level is 49.0.  Have left a message for the patient to continue her vitamin D3 at 5000 units daily and will recheck her serum vitamin D level in 1 month per Dr. Glaser

## 2020-11-20 NOTE — TELEPHONE ENCOUNTER
----- Message from Allan Glaser MD sent at 11/18/2020 12:51 PM EST -----  Please have her continue vitamin D 5000 units daily and recheck in 4 weeks.

## 2021-01-15 ENCOUNTER — OFFICE VISIT (OUTPATIENT)
Dept: INTERNAL MEDICINE | Facility: CLINIC | Age: 66
End: 2021-01-15

## 2021-01-15 DIAGNOSIS — E55.9 VITAMIN D DEFICIENCY: ICD-10-CM

## 2021-01-15 DIAGNOSIS — Z00.00 PHYSICAL EXAM: Primary | ICD-10-CM

## 2021-01-15 DIAGNOSIS — I65.23 CAROTID STENOSIS, ASYMPTOMATIC, BILATERAL: ICD-10-CM

## 2021-01-15 LAB
25(OH)D3+25(OH)D2 SERPL-MCNC: 55.3 NG/ML (ref 30–100)
ALBUMIN SERPL-MCNC: 4.5 G/DL (ref 3.5–5.2)
ALBUMIN/GLOB SERPL: 1.9 G/DL
ALP SERPL-CCNC: 64 U/L (ref 39–117)
ALT SERPL-CCNC: 12 U/L (ref 1–33)
AST SERPL-CCNC: 22 U/L (ref 1–32)
BASOPHILS # BLD AUTO: 0.06 10*3/MM3 (ref 0–0.2)
BASOPHILS NFR BLD AUTO: 1 % (ref 0–1.5)
BILIRUB SERPL-MCNC: 0.5 MG/DL (ref 0–1.2)
BUN SERPL-MCNC: 20 MG/DL (ref 8–23)
BUN/CREAT SERPL: 27 (ref 7–25)
CALCIUM SERPL-MCNC: 9.2 MG/DL (ref 8.6–10.5)
CHLORIDE SERPL-SCNC: 99 MMOL/L (ref 98–107)
CHOLEST SERPL-MCNC: 212 MG/DL (ref 0–200)
CO2 SERPL-SCNC: 29.4 MMOL/L (ref 22–29)
CREAT SERPL-MCNC: 0.74 MG/DL (ref 0.57–1)
EOSINOPHIL # BLD AUTO: 0.39 10*3/MM3 (ref 0–0.4)
EOSINOPHIL NFR BLD AUTO: 6.3 % (ref 0.3–6.2)
ERYTHROCYTE [DISTWIDTH] IN BLOOD BY AUTOMATED COUNT: 12.4 % (ref 12.3–15.4)
GLOBULIN SER CALC-MCNC: 2.4 GM/DL
GLUCOSE SERPL-MCNC: 85 MG/DL (ref 65–99)
HCT VFR BLD AUTO: 39.2 % (ref 34–46.6)
HDLC SERPL-MCNC: 81 MG/DL (ref 40–60)
HGB BLD-MCNC: 13.5 G/DL (ref 12–15.9)
IMM GRANULOCYTES # BLD AUTO: 0.01 10*3/MM3 (ref 0–0.05)
IMM GRANULOCYTES NFR BLD AUTO: 0.2 % (ref 0–0.5)
LDLC SERPL CALC-MCNC: 113 MG/DL (ref 0–100)
LYMPHOCYTES # BLD AUTO: 1.66 10*3/MM3 (ref 0.7–3.1)
LYMPHOCYTES NFR BLD AUTO: 26.9 % (ref 19.6–45.3)
MCH RBC QN AUTO: 31.5 PG (ref 26.6–33)
MCHC RBC AUTO-ENTMCNC: 34.4 G/DL (ref 31.5–35.7)
MCV RBC AUTO: 91.6 FL (ref 79–97)
MONOCYTES # BLD AUTO: 0.44 10*3/MM3 (ref 0.1–0.9)
MONOCYTES NFR BLD AUTO: 7.1 % (ref 5–12)
NEUTROPHILS # BLD AUTO: 3.62 10*3/MM3 (ref 1.7–7)
NEUTROPHILS NFR BLD AUTO: 58.5 % (ref 42.7–76)
NRBC BLD AUTO-RTO: 0 /100 WBC (ref 0–0.2)
PLATELET # BLD AUTO: 283 10*3/MM3 (ref 140–450)
POTASSIUM SERPL-SCNC: 4.8 MMOL/L (ref 3.5–5.2)
PROT SERPL-MCNC: 6.9 G/DL (ref 6–8.5)
RBC # BLD AUTO: 4.28 10*6/MM3 (ref 3.77–5.28)
SODIUM SERPL-SCNC: 136 MMOL/L (ref 136–145)
TRIGL SERPL-MCNC: 104 MG/DL (ref 0–150)
TSH SERPL DL<=0.005 MIU/L-ACNC: 1.72 UIU/ML (ref 0.27–4.2)
VLDLC SERPL CALC-MCNC: 18 MG/DL (ref 5–40)
WBC # BLD AUTO: 6.18 10*3/MM3 (ref 3.4–10.8)

## 2021-01-15 PROCEDURE — 99387 INIT PM E/M NEW PAT 65+ YRS: CPT | Performed by: NURSE PRACTITIONER

## 2021-01-15 RX ORDER — VITAMIN B COMPLEX
1 CAPSULE ORAL DAILY
Qty: 30 CAPSULE | Refills: 11 | Status: SHIPPED | OUTPATIENT
Start: 2021-01-15 | End: 2021-11-10

## 2021-01-15 RX ORDER — CHOLECALCIFEROL (VITAMIN D3) 50 MCG
2000 TABLET ORAL DAILY
Qty: 30 TABLET | Refills: 5 | Status: SHIPPED | OUTPATIENT
Start: 2021-01-15 | End: 2022-01-15

## 2021-01-18 VITALS
BODY MASS INDEX: 23.98 KG/M2 | HEIGHT: 66 IN | TEMPERATURE: 96.2 F | RESPIRATION RATE: 16 BRPM | WEIGHT: 149.2 LBS | HEART RATE: 85 BPM | DIASTOLIC BLOOD PRESSURE: 80 MMHG | OXYGEN SATURATION: 99 % | SYSTOLIC BLOOD PRESSURE: 130 MMHG

## 2021-01-24 PROBLEM — I65.23 CAROTID STENOSIS, ASYMPTOMATIC, BILATERAL: Chronic | Status: ACTIVE | Noted: 2019-08-20

## 2021-01-24 PROBLEM — E55.9 VITAMIN D DEFICIENCY: Chronic | Status: ACTIVE | Noted: 2020-09-24

## 2021-01-24 NOTE — PROGRESS NOTES
Subjective   Arin Lyons is a 65 y.o. female who presents to \[facility]\"" care and for an annual physical exam.    She presents to [facility] and is currently followed for:  1. Carotid stenosis-carotid doppler 8/2019 showed less than 50% stenosis bilaterally  2. Vitamin D deficiency-managed on daily Vitamin D 2,000 units daily.  3. Stress fx right foot-3rd metatarsal of right foot, treated by Dr. Glaser. Her Vitamin D was increased to 5,000 units during recovery but she has now lowered to 2,000 units. She states pain has gradually improved.       The following portions of the patient's history were reviewed and updated as appropriate: allergies, current medications, past social history and problem list.    History reviewed. No pertinent past medical history.      Current Outpatient Medications:   •  ibuprofen (ADVIL,MOTRIN) 200 MG tablet, Take 200 mg by mouth Every 6 (Six) Hours As Needed for mild pain (1-3)., Disp: , Rfl:   •  naproxen sodium (ALEVE) 220 MG tablet, Take 220 mg by mouth 2 (Two) Times a Day As Needed for mild pain (1-3)., Disp: , Rfl:   •  b complex vitamins capsule, Take 1 capsule by mouth Daily., Disp: 30 capsule, Rfl: 11  •  Cholecalciferol (Vitamin D) 50 MCG (2000 UT) tablet, Take 2,000 Units by mouth Daily., Disp: 30 tablet, Rfl: 5    No Known Allergies    Review of Systems   Constitutional: Negative for activity change, appetite change, chills, diaphoresis, fatigue, fever and unexpected weight change.   HENT: Negative for congestion, dental problem, drooling, ear discharge, ear pain, facial swelling, hearing loss, mouth sores, nosebleeds, postnasal drip, rhinorrhea, sinus pressure, sore throat, tinnitus and trouble swallowing.    Eyes: Negative for photophobia, pain, discharge, redness, itching and visual disturbance.   Respiratory: Negative for apnea, cough, choking, chest tightness, shortness of breath and wheezing.    Cardiovascular: Negative for chest pain, palpitations and leg  "swelling.        No orthopnea, PND, PALMER   Gastrointestinal: Negative for abdominal pain, blood in stool, constipation, diarrhea, nausea and vomiting.   Endocrine: Negative for cold intolerance, heat intolerance, polydipsia and polyuria.   Genitourinary: Negative for decreased urine volume, dysuria, enuresis, flank pain, frequency, hematuria and urgency.   Musculoskeletal: Positive for arthralgias (takes Ibuprofen if needed). Negative for back pain, gait problem, joint swelling, myalgias, neck pain and neck stiffness.   Skin: Negative for color change and rash.        No hair changes, no nail changes   Allergic/Immunologic: Negative for environmental allergies, food allergies and immunocompromised state.   Neurological: Negative for dizziness, tremors, seizures, syncope, speech difficulty, weakness, light-headedness, numbness and headaches.   Hematological: Negative for adenopathy. Does not bruise/bleed easily.   Psychiatric/Behavioral: Negative for agitation, confusion, decreased concentration, dysphoric mood, sleep disturbance and suicidal ideas. The patient is not nervous/anxious.        Objective   Vitals:    01/15/21 0913   BP: 130/80   BP Location: Left arm   Patient Position: Sitting   Cuff Size: Adult   Pulse: 85   Resp: 16   Temp: 96.2 °F (35.7 °C)   TempSrc: Oral   SpO2: 99%   Weight: 67.7 kg (149 lb 3.2 oz)   Height: 167.6 cm (66\")     Body mass index is 24.08 kg/m².  Physical Exam  Constitutional:       General: She is not in acute distress.     Appearance: Normal appearance. She is not diaphoretic.   HENT:      Head: Normocephalic and atraumatic.      Right Ear: Tympanic membrane, ear canal and external ear normal.      Left Ear: Tympanic membrane, ear canal and external ear normal.      Nose: Nose normal. No rhinorrhea.      Mouth/Throat:      Mouth: Mucous membranes are moist.      Pharynx: Oropharynx is clear.   Eyes:      General:         Right eye: No discharge.         Left eye: No discharge.      " Conjunctiva/sclera: Conjunctivae normal.   Neck:      Musculoskeletal: Normal range of motion.   Cardiovascular:      Rate and Rhythm: Normal rate and regular rhythm.      Pulses: Normal pulses.      Heart sounds: Normal heart sounds.   Pulmonary:      Effort: Pulmonary effort is normal.      Breath sounds: Normal breath sounds.   Chest:      Breasts:         Right: Normal. No mass, nipple discharge, skin change or tenderness.         Left: Normal. No mass, nipple discharge, skin change or tenderness.   Abdominal:      General: Bowel sounds are normal.      Tenderness: There is no abdominal tenderness.   Musculoskeletal:         General: No swelling or tenderness.   Skin:     General: Skin is warm and dry.   Neurological:      General: No focal deficit present.      Mental Status: She is alert and oriented to person, place, and time.   Psychiatric:         Mood and Affect: Mood normal.         Behavior: Behavior normal.         Judgment: Judgment normal.         Assessment/Plan   Diagnoses and all orders for this visit:    1. Physical exam (Primary)  -     CBC & Differential  -     Comprehensive Metabolic Panel  -     Lipid Panel  -     Cancel: TSH    2. Vitamin D deficiency  -     Cholecalciferol (Vitamin D) 50 MCG (2000 UT) tablet; Take 2,000 Units by mouth Daily.  Dispense: 30 tablet; Refill: 5  -     Vitamin D 25 Hydroxy    3. Carotid stenosis, asymptomatic, bilateral    Other orders  -     b complex vitamins capsule; Take 1 capsule by mouth Daily.  Dispense: 30 capsule; Refill: 11  -     TSH      Risk assessment:  She has a family hx (mother) of HTN-continue to monitor with home BP readings.  Her BMI is 24.08-she exercises regularly and watches diet closely.    Prevention:  She has received her annual flu vaccine. Tdap is current.  Colonoscopy is due in 2029.   She has received the Shingrix series.  She is followed by GYN (Dr. Pérez) for pap smears, mammograms and dexa scans.    Discussed healthy lifestyle  choices such as maintaining a balanced diet low in carbohydrates and limiting caffeine and alcohol intake.  Recommended routine exercise for bone strength and cardiovascular health.

## 2021-03-22 ENCOUNTER — BULK ORDERING (OUTPATIENT)
Dept: CASE MANAGEMENT | Facility: OTHER | Age: 66
End: 2021-03-22

## 2021-03-22 DIAGNOSIS — Z23 IMMUNIZATION DUE: ICD-10-CM

## 2021-04-07 ENCOUNTER — HOSPITAL ENCOUNTER (OUTPATIENT)
Dept: GENERAL RADIOLOGY | Facility: HOSPITAL | Age: 66
Discharge: HOME | End: 2021-04-07

## 2021-04-07 DIAGNOSIS — M81.0 OSTEOPOROSIS, UNSPECIFIED OSTEOPOROSIS TYPE, UNSPECIFIED PATHOLOGICAL FRACTURE PRESENCE: ICD-10-CM

## 2021-04-07 DIAGNOSIS — M41.9 SCOLIOSIS OF LUMBAR SPINE, UNSPECIFIED SCOLIOSIS TYPE: ICD-10-CM

## 2021-04-07 PROCEDURE — 71046 X-RAY EXAM CHEST 2 VIEWS: CPT

## 2021-04-07 PROCEDURE — 72110 X-RAY EXAM L-2 SPINE 4/>VWS: CPT

## 2021-07-14 ENCOUNTER — OFFICE VISIT (OUTPATIENT)
Dept: ORTHOPEDIC SURGERY | Facility: CLINIC | Age: 66
End: 2021-07-14

## 2021-07-14 VITALS — TEMPERATURE: 97.1 F | WEIGHT: 145 LBS | BODY MASS INDEX: 22.76 KG/M2 | HEIGHT: 67 IN

## 2021-07-14 DIAGNOSIS — M81.0 AGE RELATED OSTEOPOROSIS, UNSPECIFIED PATHOLOGICAL FRACTURE PRESENCE: ICD-10-CM

## 2021-07-14 DIAGNOSIS — M25.572 LEFT ANKLE PAIN, UNSPECIFIED CHRONICITY: Primary | ICD-10-CM

## 2021-07-14 DIAGNOSIS — M19.072 DJD (DEGENERATIVE JOINT DISEASE), ANKLE AND FOOT, LEFT: ICD-10-CM

## 2021-07-14 DIAGNOSIS — E55.9 VITAMIN D DEFICIENCY: Chronic | ICD-10-CM

## 2021-07-14 PROCEDURE — 99213 OFFICE O/P EST LOW 20 MIN: CPT | Performed by: NURSE PRACTITIONER

## 2021-07-14 PROCEDURE — 73610 X-RAY EXAM OF ANKLE: CPT | Performed by: NURSE PRACTITIONER

## 2021-07-14 RX ORDER — CALCIUM/PHOS/GENIST/D3/ZN/K 500MG-70MG
1 CAPSULE ORAL EVERY 12 HOURS
COMMUNITY
Start: 2021-06-15 | End: 2022-09-23

## 2021-07-14 NOTE — PROGRESS NOTES
Patient Name: Arin Lyons   YOB: 1955  Referring Primary Care Physician: Marylou Hughes APRN  BMI: Body mass index is 22.71 kg/m².    Chief Complaint:    Chief Complaint   Patient presents with   • Left Foot - Pain        HPI: avid walker walks 4 miles 5 days a week and noticed pain in her ankle. Hx of osteoporosis - taking calcium supplement.  Has a boot at home wanted to be checked out to make sure she did not have a fracture    Arin Lyons is a 66 y.o. female who presents today for evaluation of   Chief Complaint   Patient presents with   • Left Foot - Pain         Subjective   Medications:   Home Medications:  Current Outpatient Medications on File Prior to Visit   Medication Sig   • b complex vitamins capsule Take 1 capsule by mouth Daily.   • Cholecalciferol (Vitamin D) 50 MCG (2000 UT) tablet Take 2,000 Units by mouth Daily.   • Dietary Management Product (Fosteum Plus) capsule Take 1 capsule by mouth Every 12 (Twelve) Hours.   • ibuprofen (ADVIL,MOTRIN) 200 MG tablet Take 200 mg by mouth Every 6 (Six) Hours As Needed for mild pain (1-3).   • naproxen sodium (ALEVE) 220 MG tablet Take 220 mg by mouth 2 (Two) Times a Day As Needed for mild pain (1-3).     No current facility-administered medications on file prior to visit.     Current Medications:  Scheduled Meds:  Continuous Infusions:No current facility-administered medications for this visit.    PRN Meds:.    I have reviewed the patient's medical history in detail and updated the computerized patient record.  Review and summarization of old records includes:    History reviewed. No pertinent past medical history.     Past Surgical History:   Procedure Laterality Date   • COLONOSCOPY  11/06/2019   • SKIN CANCER EXCISION Right 1990        Social History     Occupational History   • Not on file   Tobacco Use   • Smoking status: Former Smoker   • Smokeless tobacco: Never Used   • Tobacco comment: quit 40+ yrs ago   Substance and Sexual  "Activity   • Alcohol use: Yes     Comment: 6 a week   • Drug use: No   • Sexual activity: Yes     Partners: Male      Social History     Social History Narrative    Lives with . Two grown daughters.         Family History   Problem Relation Age of Onset   • Hypertension Mother    • Depression Mother    • Stroke Father    • Rheum arthritis Maternal Aunt    • Heart failure Maternal Grandmother    • Thyroid disease Maternal Grandmother    • Autoimmune disease Sister    • Alzheimer's disease Sister        ROS: 14 point review of systems was performed and all other systems were reviewed and are negative except for documented findings in HPI and today's encounter.     Allergies: No Known Allergies  Constitutional:  Denies fever, shaking or chills   Eyes:  Denies change in visual acuity   HENT:  Denies nasal congestion or sore throat   Respiratory:  Denies cough or shortness of breath   Cardiovascular:  Denies chest pain or severe LE edema   GI:  Denies abdominal pain, nausea, vomiting, bloody stools or diarrhea   Musculoskeletal:  Numbness, tingling, pain, or loss of motor function only as noted above in history of present illness.  : Denies painful urination or hematuria  Integument:  Denies rash, lesion or ulceration   Neurologic:  Denies headache or focal weakness  Endocrine:  Denies lymphadenopathy  Psych:  Denies confusion or change in mental status   Hem:  Denies active bleeding    OBJECTIVE:  Physical Exam: 66 y.o. female  Wt Readings from Last 3 Encounters:   07/14/21 65.8 kg (145 lb)   01/15/21 67.7 kg (149 lb 3.2 oz)   11/19/20 65.8 kg (145 lb)     Ht Readings from Last 1 Encounters:   07/14/21 170.2 cm (67\")     Body mass index is 22.71 kg/m².  Vitals:    07/14/21 1350   Temp: 97.1 °F (36.2 °C)     Vital signs reviewed.     General Appearance:    Alert, cooperative, in no acute distress                  Eyes: conjunctiva clear  ENT: external ears and nose atraumatic  CV: no peripheral edema  Resp: " normal respiratory effort  Skin: no rashes or wounds; normal turgor  Psych: mood and affect appropriate  Lymph: no nodes appreciated  Neuro: gross sensation intact  Vascular:  Palpable peripheral pulse in noted extremity  Musculoskeletal Extremities: Skin is warm dry intact with good pulses mood sensation left ankle with no acute point tenderness she has diffuse tenderness to the lateral malleolus and the dorsum of her foot    Radiology:   Left ankle 3 views done for pain no acute fracture no comparison films readily available      Assessment:     ICD-10-CM ICD-9-CM   1. Left ankle pain, unspecified chronicity  M25.572 719.47   2. Age related osteoporosis, unspecified pathological fracture presence  M81.0 733.01   3. Vitamin D deficiency  E55.9 268.9   4. DJD (degenerative joint disease), ankle and foot, left  M19.072 715.97        MDM/Plan:   The diagnosis(es), natural history, pathophysiology and treatment for diagnosis(es) were discussed. Opportunity given and questions answered.  Biomechanics of pertinent body areas discussed.  When appropriate, the use of ambulatory aids discussed.    The diagnosis(es), natural history, pathophysiology and treatment for diagnosis(es) were discussed. Opportunity given and questions answered.  Biomechanics of pertinent body areas discussed.  When appropriate, the use of ambulatory aids discussed.  MEDICATIONS:  The risks, benefits, warnings,side effects and alternatives of medications discussed.  Inflammation/pain control; with cold, heat, elevation and/or liniments discussed as appropriate  Discussed calling to schedule MRI if not significantly better with current treatment.     Discussed with her with her history of osteoporosis and osteopenia she just increased the mileage she may have a stress fracture we cannot see on plain film offered MRI she does not want that she has a boot at home will go back in the boot and rest and follow-up with sports medicine  7/15/2021    Much of  this encounter note is an electronic transcription/translation of spoken language to printed text. The electronic translation of spoken language may permit erroneous, or at times, nonsensical words or phrases to be inadvertently transcribed; Although I have reviewed the note for such errors, some may still exist      Answers for HPI/ROS submitted by the patient on 7/14/2021  Please describe your symptoms.: Pain and swelling left ankle/foot area  Have you had these symptoms before?: No  How long have you been having these symptoms?: 1-4 days  Please list any medications you are currently taking for this condition.: Advil or Aleve  Please describe any probable cause for these symptoms. : Increased walking distance could possibly be cause.  Did not twist or turn ankle  What is the primary reason for your visit?: Other

## 2021-07-14 NOTE — PATIENT INSTRUCTIONS
Ankle Exercises  Ask your health care provider which exercises are safe for you. Do exercises exactly as told by your health care provider and adjust them as directed. It is normal to feel mild stretching, pulling, tightness, or mild discomfort as you do these exercises. Stop right away if you feel sudden pain or your pain gets worse. Do not begin these exercises until told by your health care provider.  Stretching and range-of-motion exercises  These exercises warm up your muscles and joints and improve the movement and flexibility of your ankle. These exercises may also help to relieve pain.  Dorsiflexion/plantar flexion    1. Sit with your __________ knee straight or bent. Do not rest your foot on anything.  2. Flex your __________ ankle to tilt the top of your foot toward your shin. This is called dorsiflexion.  3. Hold this position for __________ seconds.  4. Point your toes downward to tilt the top of your foot away from your shin. This is called plantar flexion.  5. Hold this position for __________ seconds.  Repeat __________ times. Complete this exercise __________ times a day.  Ankle alphabet    1. Sit with your __________ foot supported at your lower leg.  ? Do not rest your foot on anything.  ? Make sure your foot has room to move freely.  2. Think of your __________ foot as a paintbrush:  ? Move your foot to trace each letter of the alphabet in the air. Keep your hip and knee still while you trace the letters. Trace every letter from A to Z.  ? Make the letters as large as you can without causing or increasing any discomfort.  Repeat __________ times. Complete this exercise __________ times a day.  Passive ankle dorsiflexion  This is an exercise in which something or someone moves your ankle for you. You do not move it yourself.  1. Sit on a chair that is placed on a non-carpeted surface.  2. Place your __________ foot on the floor, directly under your __________ knee. Extend your __________ leg for  support.  3. Keeping your heel down, slide your __________ foot back toward the chair until you feel a stretch at your ankle or calf. If you do not feel a stretch, slide your buttocks forward to the edge of the chair while keeping your heel down.  4. Hold this stretch for __________ seconds.  Repeat __________ times. Complete this exercise __________ times a day.  Strengthening exercises  These exercises build strength and endurance in your ankle. Endurance is the ability to use your muscles for a long time, even after they get tired.  Dorsiflexors  These are muscles that lift your foot up.  1. Secure a rubber exercise band or tube to an object, such as a table leg, that will stay still when the band is pulled. Secure the other end around your __________ foot.  2. Sit on the floor, facing the object with your __________ leg extended. The band or tube should be slightly tense when your foot is relaxed.  3. Slowly flex your __________ ankle and toes to bring your foot toward your shin.  4. Hold this position for __________ seconds.  5. Slowly return your foot to the starting position, controlling the band as you do that.  Repeat __________ times. Complete this exercise __________ times a day.  Plantar flexors  These are muscles that push your foot down.  1. Sit on the floor with your __________ leg extended.  2. Loop a rubber exercise band or tube around the ball of your __________ foot. The ball of your foot is on the walking surface, right under your toes. The band or tube should be slightly tense when your foot is relaxed.  3. Slowly point your toes downward, pushing them away from you.  4. Hold this position for __________ seconds.  5. Slowly release the tension in the band or tube, controlling smoothly until your foot is back in the starting position.  Repeat __________ times. Complete this exercise __________ times a day.  Towel curls    1. Sit in a chair on a non-carpeted surface, and put your feet on the  floor.  2. Place a towel in front of your feet. If told by your health care provider, add a __________ pound weight to the end of the towel.  3. Keeping your heel on the floor, put your __________ foot on the towel.  4. Pull the towel toward you by grabbing the towel with your toes and curling them under. Keep your heel on the floor.  5. Let your toes relax.  6. Grab the towel again. Keep pulling the towel until it is completely underneath your foot.  Repeat __________ times. Complete this exercise __________ times a day.  Standing plantar flexion  This is an exercise in which you use your toes to lift your body's weight while standing.  1. Stand with your feet shoulder-width apart.  2. Keep your weight spread evenly over the width of your feet while you rise up on your toes. Use a wall or table to steady yourself if needed, but try not to use it for support.  3. If this exercise is too easy, try these options:  ? Shift your weight toward your __________ leg until you feel challenged.  ? If told by your health care provider, lift your uninjured leg off the floor.  4. Hold this position for __________ seconds.  Repeat __________ times. Complete this exercise __________ times a day.  Tandem walking  1. Stand with one foot directly in front of the other.  2. Slowly raise your back foot up, lifting your heel before your toes, and place it directly in front of your other foot.  3. Continue to walk in this heel-to-toe way for __________ or for as long as told by your health care provider. Have a countertop or wall nearby to use if needed to keep your balance, but try not to hold onto anything for support.  Repeat __________ times. Complete this exercise __________ times a day.  This information is not intended to replace advice given to you by your health care provider. Make sure you discuss any questions you have with your health care provider.  Document Revised: 09/14/2019 Document Reviewed: 09/16/2019  [facility] Patient  Education © 2021 [employer]

## 2021-07-26 ENCOUNTER — TELEPHONE (OUTPATIENT)
Dept: ORTHOPEDIC SURGERY | Facility: CLINIC | Age: 66
End: 2021-07-26

## 2021-07-26 NOTE — TELEPHONE ENCOUNTER
Caller: BRENDA HOPPER    Relationship: SELF     Best call back number: [phone]    What form or medical record are you requesting: X-RAY / DISK OF LEFT FOOT/ANKLE    Who is requesting this form or medical record from you: PATIENT    How would you like to receive the form or medical records (pick-up, mail, fax): *  PATIENT WOULD LIKE TO  X-RAY/DISK TOMORROW AROUND 9:30      Timeframe paperwork needed: 7-27-21 / 9:30 - 9:45    Additional notes: PATIENT WOULD LIKE A CALL BACK TO CONFIRM  X-RAY/DISK WILL BE READY AT THE REQUESTED TIME.

## 2021-10-22 ENCOUNTER — IMMUNIZATION (OUTPATIENT)
Dept: VACCINE CLINIC | Facility: HOSPITAL | Age: 66
End: 2021-10-22

## 2021-10-22 PROCEDURE — 0004A ADM SARSCOV2 30MCG/0.3ML BOOSTER: CPT | Performed by: INTERNAL MEDICINE

## 2021-10-22 PROCEDURE — 91300 HC SARSCOV02 VAC 30MCG/0.3ML IM: CPT | Performed by: INTERNAL MEDICINE

## 2021-11-10 ENCOUNTER — OFFICE VISIT (OUTPATIENT)
Dept: INTERNAL MEDICINE | Facility: CLINIC | Age: 66
End: 2021-11-10

## 2021-11-10 VITALS
WEIGHT: 145 LBS | BODY MASS INDEX: 22.76 KG/M2 | HEART RATE: 70 BPM | SYSTOLIC BLOOD PRESSURE: 124 MMHG | DIASTOLIC BLOOD PRESSURE: 84 MMHG | OXYGEN SATURATION: 98 % | TEMPERATURE: 97.1 F | HEIGHT: 67 IN

## 2021-11-10 DIAGNOSIS — E55.9 VITAMIN D DEFICIENCY: Chronic | ICD-10-CM

## 2021-11-10 DIAGNOSIS — I65.23 CAROTID STENOSIS, ASYMPTOMATIC, BILATERAL: Chronic | ICD-10-CM

## 2021-11-10 DIAGNOSIS — Z00.00 WELCOME TO MEDICARE PREVENTIVE VISIT: Primary | ICD-10-CM

## 2021-11-10 DIAGNOSIS — M54.2 NECK PAIN ON LEFT SIDE: ICD-10-CM

## 2021-11-10 DIAGNOSIS — R42 VERTIGO: ICD-10-CM

## 2021-11-10 DIAGNOSIS — M54.16 LUMBAR RADICULOPATHY: ICD-10-CM

## 2021-11-10 DIAGNOSIS — M81.0 OSTEOPOROSIS WITHOUT CURRENT PATHOLOGICAL FRACTURE, UNSPECIFIED OSTEOPOROSIS TYPE: ICD-10-CM

## 2021-11-10 PROCEDURE — G0402 INITIAL PREVENTIVE EXAM: HCPCS | Performed by: NURSE PRACTITIONER

## 2021-11-10 PROCEDURE — 1125F AMNT PAIN NOTED PAIN PRSNT: CPT | Performed by: NURSE PRACTITIONER

## 2021-11-10 PROCEDURE — 1170F FXNL STATUS ASSESSED: CPT | Performed by: NURSE PRACTITIONER

## 2021-11-10 PROCEDURE — 1159F MED LIST DOCD IN RCRD: CPT | Performed by: NURSE PRACTITIONER

## 2021-11-10 RX ORDER — MULTIPLE VITAMINS W/ MINERALS TAB 9MG-400MCG
1 TAB ORAL DAILY
COMMUNITY

## 2021-11-10 NOTE — PROGRESS NOTES
"Chief Complaint  Back Pain and Neck Pain    Subjective          Arin Lyons presents to [facility]  History of Present Illness    Objective   Vital Signs:   /88 (BP Location: Left arm, Patient Position: Sitting, Cuff Size: Adult)   Pulse 70   Temp 97.1 °F (36.2 °C) (Temporal)   Ht 170.2 cm (67\")   Wt 65.8 kg (145 lb)   SpO2 98%   BMI 22.71 kg/m²     Physical Exam   Result Review :                 Assessment and Plan    There are no diagnoses linked to this encounter.    Follow Up   No follow-ups on file.  Patient was given instructions and counseling regarding her condition or for health maintenance advice. Please see specific information pulled into the AVS if appropriate.       "

## 2021-11-10 NOTE — PROGRESS NOTES
The ABCs of the Annual Wellness Visit  Welcome to Medicare Visit    Chief Complaint   Patient presents with   • Welcome To Medicare   • Neck Pain   • Back Pain     Subjective {   History of Present Illness:  Arin Lyons is a 66 y.o. female who presents for a  Welcome to Medicare Visit. She also presents for f/u regarding Vitamin D deficiency and worsening back and neck pain.    She saw ENT (Dr. Ferreira) for mgmt of vertigo in May-sx have improved.    She is followed by GYN (Dr. Pérez) for mgmt of osteoporosis per recent DEXA scan, recently began Fosteum Plus which she is tolerating well thus far.    She c/o increasing back pain for the past 4 months without acute injury or trauma. She walks every morning which she is tolerating well but states pain radiates into bilateral legs. No change in bowel or bladder function.    Her neck is also painful with intermittent left arm pain, worse with turning head.    The following portions of the patient's history were reviewed and   updated as appropriate: allergies, current medications, past family history, past medical history, past social history, past surgical history and problem list.     Compared to one year ago, the patient feels her physical health is the same (although notes worsening pain)    Compared to one year ago, the patient feels her mental health is the same.    Recent Hospitalizations:  She was not admitted to the hospital during the last year.       Current Medical Providers:  Patient Care Team:  Marylou Hughes APRN as PCP - General (Internal Medicine)  Gwen Pérez MD as Consulting Physician (Obstetrics and Gynecology)    Outpatient Medications Prior to Visit   Medication Sig Dispense Refill   • Cholecalciferol (Vitamin D) 50 MCG (2000 UT) tablet Take 2,000 Units by mouth Daily. 30 tablet 5   • Dietary Management Product (Fosteum Plus) capsule Take 1 capsule by mouth Every 12 (Twelve) Hours.     • ibuprofen (ADVIL,MOTRIN) 200 MG tablet Take 200 mg  by mouth Every 6 (Six) Hours As Needed for mild pain (1-3).     • multivitamin with minerals tablet tablet Take 1 tablet by mouth Daily.     • naproxen sodium (ALEVE) 220 MG tablet Take 220 mg by mouth 2 (Two) Times a Day As Needed for mild pain (1-3).     • b complex vitamins capsule Take 1 capsule by mouth Daily. 30 capsule 11     No facility-administered medications prior to visit.       No opioid medication identified on active medication list. I have reviewed chart for other potential  high risk medication/s and harmful drug interactions in the elderly.          Aspirin is not on active medication list.  Aspirin use is not indicated based on review of current medical condition/s. Risk of harm outweighs potential benefits.  .    Patient Active Problem List   Diagnosis   • Carotid stenosis, asymptomatic, bilateral   • Arthritis of foot   • Closed nondisplaced fracture of second metatarsal bone of right foot   • Closed nondisplaced fracture of third metatarsal bone of right foot   • Vitamin D deficiency   • Lumbar radiculopathy   • Osteoporosis without current pathological fracture   • Neck pain on left side   • Vertigo     Advance Care Planning  Advance Directive is not on file.  ACP discussion was held with the patient during this visit. Patient has an advance directive (not in EMR), copy requested.    Review of Systems   HENT: Positive for congestion and postnasal drip.    Respiratory: Negative for chest tightness and shortness of breath.    Cardiovascular: Negative for chest pain and leg swelling.   Gastrointestinal: Negative for abdominal pain.   Musculoskeletal: Positive for arthralgias, back pain, neck pain and neck stiffness.   Neurological: Positive for dizziness and light-headedness.        Objective      Vitals:    11/10/21 1027 11/10/21 1112   BP: 144/88 124/84   BP Location: Left arm Left arm   Patient Position: Sitting Sitting   Cuff Size: Adult Adult   Pulse: 70    Temp: 97.1 °F (36.2 °C)   "  TempSrc: Temporal    SpO2: 98%    Weight: 65.8 kg (145 lb)    Height: 170.2 cm (67\")    PainSc:   6    PainLoc: Back      BMI Readings from Last 1 Encounters:   11/10/21 22.71 kg/m²   BMI is within normal parameters. No follow-up required.    Does the patient have evidence of cognitive impairment? No    Physical Exam  Constitutional:       Appearance: She is well-developed. She is not ill-appearing.   HENT:      Head: Normocephalic.      Right Ear: Hearing, tympanic membrane and external ear normal.      Left Ear: Hearing, tympanic membrane and external ear normal.      Nose: Nose normal. No nasal deformity, mucosal edema or rhinorrhea.      Right Sinus: No maxillary sinus tenderness or frontal sinus tenderness.      Left Sinus: No maxillary sinus tenderness or frontal sinus tenderness.      Mouth/Throat:      Dentition: Normal dentition.   Eyes:      General: Lids are normal.         Right eye: No discharge.         Left eye: No discharge.      Conjunctiva/sclera: Conjunctivae normal.      Right eye: No exudate.     Left eye: No exudate.  Neck:      Thyroid: No thyroid mass or thyromegaly.      Vascular: No carotid bruit.      Trachea: Trachea normal.   Cardiovascular:      Rate and Rhythm: Regular rhythm.      Pulses: Normal pulses.      Heart sounds: Normal heart sounds. No murmur heard.      Pulmonary:      Effort: No respiratory distress.      Breath sounds: Normal breath sounds. No decreased breath sounds, wheezing, rhonchi or rales.   Abdominal:      General: Bowel sounds are normal.      Palpations: Abdomen is soft.      Tenderness: There is no abdominal tenderness.   Musculoskeletal:      Cervical back: Normal range of motion. No edema. Pain with movement and muscular tenderness present.      Lumbar back: Tenderness present.   Lymphadenopathy:      Head:      Right side of head: No submental, submandibular, tonsillar, preauricular, posterior auricular or occipital adenopathy.      Left side of head: No " submental, submandibular, tonsillar, preauricular, posterior auricular or occipital adenopathy.   Skin:     General: Skin is warm and dry.      Nails: There is no clubbing.   Neurological:      Mental Status: She is alert.   Psychiatric:         Behavior: Behavior is cooperative.         Lab Results   Component Value Date    CHLPL 216 (H) 11/10/2021    TRIG 56 11/10/2021    HDL 79 11/10/2021     (H) 11/10/2021    VLDL 10 11/10/2021       Procedures       HEALTH RISK ASSESSMENT    Smoking Status:  Social History     Tobacco Use   Smoking Status Former Smoker   • Packs/day: 0.25   • Years: 2.00   • Pack years: 0.50   • Types: Cigarettes   Smokeless Tobacco Never Used   Tobacco Comment    quit 40+ yrs ago     Alcohol Consumption:  Social History     Substance and Sexual Activity   Alcohol Use Yes   • Alcohol/week: 5.0 standard drinks   • Types: 5 Glasses of wine per week    Comment: social drinks       Fall Risk Screen:    MOISÉS Fall Risk Assessment was completed, and patient is at LOW risk for falls.Assessment completed on:11/10/2021    Depression Screen:   PHQ-2/PHQ-9 Depression Screening 11/10/2021   Little interest or pleasure in doing things 0   Feeling down, depressed, or hopeless 0   Trouble falling or staying asleep, or sleeping too much 1   Feeling tired or having little energy 0   Poor appetite or overeating 0   Feeling bad about yourself - or that you are a failure or have let yourself or your family down 0   Trouble concentrating on things, such as reading the newspaper or watching television 0   Moving or speaking so slowly that other people could have noticed. Or the opposite - being so fidgety or restless that you have been moving around a lot more than usual 0   Thoughts that you would be better off dead, or of hurting yourself in some way 0   Total Score 1   If you checked off any problems, how difficult have these problems made it for you to do your work, take care of things at home, or get  along with other people? Not difficult at all       Health Habits and Functional and Cognitive Screening:  Functional & Cognitive Status 11/10/2021   Do you have difficulty preparing food and eating? No   Do you have difficulty bathing yourself, getting dressed or grooming yourself? No   Do you have difficulty using the toilet? No   Do you have difficulty moving around from place to place? No   Do you have trouble with steps or getting out of a bed or a chair? No   Current Diet Well Balanced Diet   Dental Exam Up to date   Eye Exam Up to date   Exercise (times per week) 6 times per week   Current Exercises Include Walking   Do you need help using the phone?  No   Are you deaf or do you have serious difficulty hearing?  No   Do you need help with transportation? No   Do you need help shopping? No   Do you need help preparing meals?  No   Do you need help with housework?  No   Do you need help with laundry? No   Do you need help taking your medications? No   Do you need help managing money? No   Do you ever drive or ride in a car without wearing a seat belt? No   Have you felt unusual stress, anger or loneliness in the last month? No   Who do you live with? Spouse   If you need help, do you have trouble finding someone available to you? No   Have you been bothered in the last four weeks by sexual problems? No   Do you have difficulty concentrating, remembering or making decisions? No       Visual Acuity:    No exam data present    Age-appropriate Screening Schedule:  Refer to the list below for future screening recommendations based on patient's age, sex and/or medical conditions. Orders for these recommended tests are listed in the plan section. The patient has been provided with a written plan.    Health Maintenance   Topic Date Due   • INFLUENZA VACCINE  08/01/2021   • MAMMOGRAM  03/19/2022   • DXA SCAN  04/07/2023   • PAP SMEAR  04/07/2024   • TDAP/TD VACCINES (2 - Td or Tdap) 08/20/2029   • ZOSTER VACCINE   Completed          Assessment/Plan   CMS Preventative Services Quick Reference  Risk Factors Identified During Encounter  Immunizations Discussed/Encouraged (specific Immunizations; Influenza  The above risks/problems have been discussed with the patient.  Pertinent information has been shared with the patient in the After Visit Summary.  Follow up plans and orders are seen below in the Assessment/Plan Section.    Diagnoses and all orders for this visit:    1. Welcome to Medicare preventive visit (Primary)  -     TSH    2. Carotid stenosis, asymptomatic, bilateral  Assessment & Plan:  Carotid doppler 8/2019 less than 50% blockage bilaterally, repeat carotid u/s    Orders:  -     CBC & Differential  -     Comprehensive Metabolic Panel  -     Lipid Panel  -     Duplex Carotid Ultrasound CAR; Future    3. Vitamin D deficiency  Assessment & Plan:  She is currently managed on Vitamin D supplement, recheck level    Orders:  -     Vitamin D 25 Hydroxy    4. Lumbar radiculopathy  Assessment & Plan:  Due to worsening pain radiating into bilateral legs will obtain MRI to further evaluate    Orders:  -     MRI Lumbar Spine Without Contrast; Future    5. Osteoporosis without current pathological fracture, unspecified osteoporosis type  Assessment & Plan:  Dx by GYN (Dr. Gwen Pérez) and started on Fosteum Plus. She also walks daily and takes a Vitamin D supplement      6. Neck pain on left side  Assessment & Plan:  Intermittent pain into left arm, will continue to monitor and consider further evaluation if sx persist/worsen      7. Vertigo  Assessment & Plan:  Evaluated by ENT 5/2021 (Dr. Ferreira), sx resolved        Follow Up:   Return for f/u after MRI.     An After Visit Summary and PPPS were made available to the patient.

## 2021-11-11 LAB
25(OH)D3+25(OH)D2 SERPL-MCNC: 46 NG/ML (ref 30–100)
ALBUMIN SERPL-MCNC: 4.6 G/DL (ref 3.8–4.8)
ALBUMIN/GLOB SERPL: 1.8 {RATIO} (ref 1.2–2.2)
ALP SERPL-CCNC: 76 IU/L (ref 44–121)
ALT SERPL-CCNC: 15 IU/L (ref 0–32)
AST SERPL-CCNC: 25 IU/L (ref 0–40)
BASOPHILS # BLD AUTO: 0.1 X10E3/UL (ref 0–0.2)
BASOPHILS NFR BLD AUTO: 1 %
BILIRUB SERPL-MCNC: 0.6 MG/DL (ref 0–1.2)
BUN SERPL-MCNC: 16 MG/DL (ref 8–27)
BUN/CREAT SERPL: 20 (ref 12–28)
CALCIUM SERPL-MCNC: 9.5 MG/DL (ref 8.7–10.3)
CHLORIDE SERPL-SCNC: 103 MMOL/L (ref 96–106)
CHOLEST SERPL-MCNC: 216 MG/DL (ref 100–199)
CO2 SERPL-SCNC: 22 MMOL/L (ref 20–29)
CREAT SERPL-MCNC: 0.79 MG/DL (ref 0.57–1)
EOSINOPHIL # BLD AUTO: 0.4 X10E3/UL (ref 0–0.4)
EOSINOPHIL NFR BLD AUTO: 7 %
ERYTHROCYTE [DISTWIDTH] IN BLOOD BY AUTOMATED COUNT: 12.1 % (ref 11.7–15.4)
GLOBULIN SER CALC-MCNC: 2.5 G/DL (ref 1.5–4.5)
GLUCOSE SERPL-MCNC: 82 MG/DL (ref 65–99)
HCT VFR BLD AUTO: 41 % (ref 34–46.6)
HDLC SERPL-MCNC: 79 MG/DL
HGB BLD-MCNC: 13.7 G/DL (ref 11.1–15.9)
IMM GRANULOCYTES # BLD AUTO: 0 X10E3/UL (ref 0–0.1)
IMM GRANULOCYTES NFR BLD AUTO: 0 %
LDLC SERPL CALC-MCNC: 127 MG/DL (ref 0–99)
LYMPHOCYTES # BLD AUTO: 2 X10E3/UL (ref 0.7–3.1)
LYMPHOCYTES NFR BLD AUTO: 34 %
MCH RBC QN AUTO: 30.7 PG (ref 26.6–33)
MCHC RBC AUTO-ENTMCNC: 33.4 G/DL (ref 31.5–35.7)
MCV RBC AUTO: 92 FL (ref 79–97)
MONOCYTES # BLD AUTO: 0.6 X10E3/UL (ref 0.1–0.9)
MONOCYTES NFR BLD AUTO: 10 %
NEUTROPHILS # BLD AUTO: 2.7 X10E3/UL (ref 1.4–7)
NEUTROPHILS NFR BLD AUTO: 48 %
PLATELET # BLD AUTO: 300 X10E3/UL (ref 150–450)
POTASSIUM SERPL-SCNC: 5.1 MMOL/L (ref 3.5–5.2)
PROT SERPL-MCNC: 7.1 G/DL (ref 6–8.5)
RBC # BLD AUTO: 4.46 X10E6/UL (ref 3.77–5.28)
SODIUM SERPL-SCNC: 140 MMOL/L (ref 134–144)
TRIGL SERPL-MCNC: 56 MG/DL (ref 0–149)
TSH SERPL DL<=0.005 MIU/L-ACNC: 1.87 UIU/ML (ref 0.45–4.5)
VLDLC SERPL CALC-MCNC: 10 MG/DL (ref 5–40)
WBC # BLD AUTO: 5.7 X10E3/UL (ref 3.4–10.8)

## 2021-11-15 NOTE — PROGRESS NOTES
Ms. Lyons, your labs from last week show a normal hemoglobin and hematocrit without anemia.  Your kidney and liver function as well as electrolytes are normal.  Your cholesterol is mildly elevated (goal is for LDL to be under 100)-continue low-fat, low-cholesterol diet along with regular exercise.  Your thyroid function is normal.  Your vitamin D level is normal-continue daily supplement.

## 2021-11-26 ENCOUNTER — HOSPITAL ENCOUNTER (OUTPATIENT)
Dept: MRI IMAGING | Facility: HOSPITAL | Age: 66
Discharge: HOME | End: 2021-11-26
Admitting: NURSE PRACTITIONER

## 2021-11-26 DIAGNOSIS — M54.16 LUMBAR RADICULOPATHY: ICD-10-CM

## 2021-11-26 PROCEDURE — 72148 MRI LUMBAR SPINE W/O DYE: CPT

## 2021-11-28 PROBLEM — M81.0 OSTEOPOROSIS WITHOUT CURRENT PATHOLOGICAL FRACTURE: Chronic | Status: ACTIVE | Noted: 2021-11-28

## 2021-11-28 PROBLEM — M54.16 LUMBAR RADICULOPATHY: Status: ACTIVE | Noted: 2021-11-28

## 2021-11-28 PROBLEM — M54.2 NECK PAIN ON LEFT SIDE: Status: ACTIVE | Noted: 2021-11-28

## 2021-11-28 PROBLEM — R42 VERTIGO: Status: ACTIVE | Noted: 2021-11-28

## 2021-11-28 PROBLEM — M54.2 NECK PAIN ON LEFT SIDE: Chronic | Status: ACTIVE | Noted: 2021-11-28

## 2021-11-28 PROBLEM — M81.0 OSTEOPOROSIS WITHOUT CURRENT PATHOLOGICAL FRACTURE: Status: ACTIVE | Noted: 2021-11-28

## 2021-11-28 PROBLEM — M54.16 LUMBAR RADICULOPATHY: Chronic | Status: ACTIVE | Noted: 2021-11-28

## 2021-11-28 PROBLEM — R42 VERTIGO: Chronic | Status: ACTIVE | Noted: 2021-11-28

## 2021-11-28 NOTE — ASSESSMENT & PLAN NOTE
Intermittent pain into left arm, will continue to monitor and consider further evaluation if sx persist/worsen

## 2021-11-28 NOTE — ASSESSMENT & PLAN NOTE
Dx by GYN (Dr. Gwen Pérez) and started on Fosteum Plus. She also walks daily and takes a Vitamin D supplement

## 2021-11-29 ENCOUNTER — HOSPITAL ENCOUNTER (OUTPATIENT)
Dept: CARDIOLOGY | Facility: HOSPITAL | Age: 66
Discharge: HOME | End: 2021-11-29
Admitting: NURSE PRACTITIONER

## 2021-11-29 DIAGNOSIS — I65.23 CAROTID STENOSIS, ASYMPTOMATIC, BILATERAL: ICD-10-CM

## 2021-11-29 LAB
BH CV XLRA MEAS LEFT DIST CCA EDV: -27.4 CM/SEC
BH CV XLRA MEAS LEFT DIST CCA PSV: -75.5 CM/SEC
BH CV XLRA MEAS LEFT DIST ICA EDV: -26.3 CM/SEC
BH CV XLRA MEAS LEFT DIST ICA PSV: -79.7 CM/SEC
BH CV XLRA MEAS LEFT ICA/CCA RATIO: 1.24
BH CV XLRA MEAS LEFT MID ICA EDV: -33.6 CM/SEC
BH CV XLRA MEAS LEFT MID ICA PSV: -93.3 CM/SEC
BH CV XLRA MEAS LEFT PROX CCA EDV: 25.2 CM/SEC
BH CV XLRA MEAS LEFT PROX CCA PSV: 86 CM/SEC
BH CV XLRA MEAS LEFT PROX ECA EDV: 17.7 CM/SEC
BH CV XLRA MEAS LEFT PROX ECA PSV: 80.5 CM/SEC
BH CV XLRA MEAS LEFT PROX ICA EDV: 22.8 CM/SEC
BH CV XLRA MEAS LEFT PROX ICA PSV: 77 CM/SEC
BH CV XLRA MEAS LEFT PROX SCLA PSV: 147.7 CM/SEC
BH CV XLRA MEAS LEFT VERTEBRAL A EDV: -19.3 CM/SEC
BH CV XLRA MEAS LEFT VERTEBRAL A PSV: -57.4 CM/SEC
BH CV XLRA MEAS RIGHT DIST CCA EDV: 21.6 CM/SEC
BH CV XLRA MEAS RIGHT DIST CCA PSV: 82.9 CM/SEC
BH CV XLRA MEAS RIGHT DIST ICA EDV: -28.4 CM/SEC
BH CV XLRA MEAS RIGHT DIST ICA PSV: -72.6 CM/SEC
BH CV XLRA MEAS RIGHT ICA/CCA RATIO: 1.01
BH CV XLRA MEAS RIGHT MID ICA EDV: -26 CM/SEC
BH CV XLRA MEAS RIGHT MID ICA PSV: -77.6 CM/SEC
BH CV XLRA MEAS RIGHT PROX CCA EDV: -22.3 CM/SEC
BH CV XLRA MEAS RIGHT PROX CCA PSV: -91.2 CM/SEC
BH CV XLRA MEAS RIGHT PROX ECA EDV: -19.6 CM/SEC
BH CV XLRA MEAS RIGHT PROX ECA PSV: -88.8 CM/SEC
BH CV XLRA MEAS RIGHT PROX ICA EDV: -24 CM/SEC
BH CV XLRA MEAS RIGHT PROX ICA PSV: -84.1 CM/SEC
BH CV XLRA MEAS RIGHT PROX SCLA PSV: 134.2 CM/SEC
BH CV XLRA MEAS RIGHT VERTEBRAL A EDV: 24 CM/SEC
BH CV XLRA MEAS RIGHT VERTEBRAL A PSV: 63.6 CM/SEC
LEFT ARM BP: NORMAL MMHG
RIGHT ARM BP: NORMAL MMHG

## 2021-11-29 PROCEDURE — 93880 EXTRACRANIAL BILAT STUDY: CPT

## 2021-11-29 NOTE — PROGRESS NOTES
Your carotid ultrasound did not show any acute changes, we will discuss further at your upcoming appointment.

## 2021-12-01 ENCOUNTER — OFFICE VISIT (OUTPATIENT)
Dept: INTERNAL MEDICINE | Facility: CLINIC | Age: 66
End: 2021-12-01

## 2021-12-01 VITALS
DIASTOLIC BLOOD PRESSURE: 80 MMHG | SYSTOLIC BLOOD PRESSURE: 124 MMHG | OXYGEN SATURATION: 100 % | HEART RATE: 75 BPM | HEIGHT: 67 IN | BODY MASS INDEX: 22.91 KG/M2 | WEIGHT: 146 LBS | TEMPERATURE: 97.1 F

## 2021-12-01 DIAGNOSIS — M51.36 DEGENERATIVE DISC DISEASE, LUMBAR: Primary | ICD-10-CM

## 2021-12-01 DIAGNOSIS — I65.23 CAROTID STENOSIS, ASYMPTOMATIC, BILATERAL: Chronic | ICD-10-CM

## 2021-12-01 DIAGNOSIS — M81.0 AGE-RELATED OSTEOPOROSIS WITHOUT CURRENT PATHOLOGICAL FRACTURE: ICD-10-CM

## 2021-12-01 DIAGNOSIS — M54.16 LUMBAR RADICULOPATHY: Chronic | ICD-10-CM

## 2021-12-01 PROCEDURE — 99213 OFFICE O/P EST LOW 20 MIN: CPT | Performed by: NURSE PRACTITIONER

## 2021-12-01 NOTE — PROGRESS NOTES
"Chief Complaint  Follow-up, Back Pain, Osteoporosis, and Carotid stenosis    Subjective          Arin Lyons presents to [facility] for f/u regarding back pain, osteoporosis and carotid stenosis.    She continues to complain of low back pain radiating to her right leg.  Her recent MRI of the lumbar spine showed scoliosis.  There is also left-sided disc space narrowing at L4-5.    Objective   Vital Signs:   /80 (BP Location: Left arm, Patient Position: Sitting, Cuff Size: Adult)   Pulse 75   Temp 97.1 °F (36.2 °C) (Temporal)   Ht 170.2 cm (67\")   Wt 66.2 kg (146 lb)   SpO2 100%   BMI 22.87 kg/m²     Physical Exam  Constitutional:       Appearance: She is well-developed. She is not ill-appearing.   HENT:      Head: Normocephalic.      Right Ear: Hearing, tympanic membrane and external ear normal.      Left Ear: Hearing, tympanic membrane and external ear normal.      Nose: Nose normal. No nasal deformity, mucosal edema or rhinorrhea.      Right Sinus: No maxillary sinus tenderness or frontal sinus tenderness.      Left Sinus: No maxillary sinus tenderness or frontal sinus tenderness.      Mouth/Throat:      Dentition: Normal dentition.   Eyes:      General: Lids are normal.         Right eye: No discharge.         Left eye: No discharge.      Conjunctiva/sclera: Conjunctivae normal.      Right eye: No exudate.     Left eye: No exudate.  Neck:      Thyroid: No thyroid mass or thyromegaly.      Vascular: No carotid bruit.      Trachea: Trachea normal.   Cardiovascular:      Rate and Rhythm: Regular rhythm.      Pulses: Normal pulses.      Heart sounds: Normal heart sounds. No murmur heard.      Pulmonary:      Effort: No respiratory distress.      Breath sounds: Normal breath sounds. No decreased breath sounds, wheezing, rhonchi or rales.   Musculoskeletal:      Cervical back: Normal range of motion. No edema.      Lumbar back: Tenderness present.   Lymphadenopathy:      Head: "      Right side of head: No submental, submandibular, tonsillar, preauricular, posterior auricular or occipital adenopathy.      Left side of head: No submental, submandibular, tonsillar, preauricular, posterior auricular or occipital adenopathy.   Skin:     General: Skin is warm and dry.      Nails: There is no clubbing.   Neurological:      Mental Status: She is alert.   Psychiatric:         Behavior: Behavior is cooperative.        Result Review :   The following data was reviewed by: NARCISA Israel on 12/01/2021:  Common labs    Common Labsle 1/15/21 1/15/21 1/15/21 11/10/21 11/10/21 11/10/21    1033 1033 1033 1125 1125 1125   Glucose  85   82    BUN  20   16    Creatinine  0.74   0.79    eGFR Non  Am  78   77    eGFR African Am  95   89    Sodium  136   140    Potassium  4.8   5.1    Chloride  99   103    Calcium  9.2   9.5    Total Protein  6.9   7.1    Albumin  4.50   4.6    Total Bilirubin  0.5   0.6    Alkaline Phosphatase  64   76    AST (SGOT)  22   25    ALT (SGPT)  12   15    WBC 6.18   5.7     Hemoglobin 13.5   13.7     Hematocrit 39.2   41.0     Platelets 283   300     Total Cholesterol   212 (A)   216 (A)   Triglycerides   104   56   HDL Cholesterol   81 (A)   79   LDL Cholesterol    113 (A)   127 (A)   (A) Abnormal value       Comments are available for some flowsheets but are not being displayed.           Data reviewed: Radiologic studies dexa scan, carotid u/s, MRI lumbar spine          Assessment and Plan    Diagnoses and all orders for this visit:    1. Degenerative disc disease, lumbar (Primary)  Assessment & Plan:  MRI 11/2021: scoliotic curvature, retrolisthesis    Orders:  -     Ambulatory Referral to Pain Management    2. Lumbar radiculopathy  Assessment & Plan:  She continues to experience significant back pain radiating into bilateral legs, will refer to pain mgmt for further evaluation    Orders:  -     Ambulatory Referral to Pain Management    3. Age-related osteoporosis  without current pathological fracture  Assessment & Plan:  Bone density 11/2021 by GYN, started on Forsteum      4. Carotid stenosis, asymptomatic, bilateral  Assessment & Plan:  Carotid u/s 11/29/2021: mild left carotid stenosis, no stenosis on right  Stable from previous ultrasound        Follow Up   Return in about 6 months (around 6/1/2022).  Patient was given instructions and counseling regarding her condition or for health maintenance advice. Please see specific information pulled into the AVS if appropriate.       Answers for HPI/ROS submitted by the patient on 11/29/2021  Please describe your symptoms.: We are going over MRI results  Have you had these symptoms before?: Yes  How long have you been having these symptoms?: Greater than 2 weeks  What is the primary reason for your visit?: Other

## 2021-12-09 ENCOUNTER — TELEPHONE (OUTPATIENT)
Dept: INTERNAL MEDICINE | Facility: CLINIC | Age: 66
End: 2021-12-09

## 2021-12-09 NOTE — TELEPHONE ENCOUNTER
HUB ATTEMPTED TO WARM TRANSFER TO THE OFFICE FOR AN ACTIVE REFERRAL BUT WAS UNSUCCESSFUL     Caller: Arin Lyons    Relationship: Self    Best call back number: [phone] (H)    What is the best time to reach you: ANY TIME     Who are you requesting to speak with (clinical staff, provider,  specific staff member): CLINICAL STAFF      What was the call regarding:  PATIENT HAS BEEN REFERRED TO [facility] FOR [facility] BUT IS UNABLE TO GET IN FOR AN APPOINTMENT WITH THEM UNTIL 02/14/22 AND SHE IS NEEDING TO BE SEEN SOONER.      ALTHOUGH SHE WAS TRYING TO AVOID THE [place] AREA, SHE IS REQUESTING HELP IN GETTING SET UP FOR [facility] OFF [facility].   SHE DIDN'T KNOW IF SHE NEEDS A NEW REFERRAL, OR IF THERE WAS A PHONE NUMBER FOR THIS SHE CAN CALL AND GET SET UP ON HER OWN.    PLEASE CALL AND ADVISE

## 2021-12-12 PROBLEM — M51.369 DEGENERATIVE DISC DISEASE, LUMBAR: Status: ACTIVE | Noted: 2021-12-12

## 2021-12-12 PROBLEM — M51.36 DEGENERATIVE DISC DISEASE, LUMBAR: Status: ACTIVE | Noted: 2021-12-12

## 2021-12-12 NOTE — ASSESSMENT & PLAN NOTE
Carotid u/s 11/29/2021: mild left carotid stenosis, no stenosis on right  Stable from previous ultrasound

## 2021-12-12 NOTE — ASSESSMENT & PLAN NOTE
She continues to experience significant back pain radiating into bilateral legs, will refer to pain mgmt for further evaluation

## 2022-03-28 ENCOUNTER — APPOINTMENT (OUTPATIENT)
Dept: WOMENS IMAGING | Facility: HOSPITAL | Age: 67
End: 2022-03-28

## 2022-03-28 PROCEDURE — 77067 SCR MAMMO BI INCL CAD: CPT | Performed by: RADIOLOGY

## 2022-03-28 PROCEDURE — 77063 BREAST TOMOSYNTHESIS BI: CPT | Performed by: RADIOLOGY

## 2022-04-10 ENCOUNTER — APPOINTMENT (OUTPATIENT)
Dept: CT IMAGING | Facility: HOSPITAL | Age: 67
End: 2022-04-10

## 2022-04-10 ENCOUNTER — HOSPITAL ENCOUNTER (OUTPATIENT)
Facility: HOSPITAL | Age: 67
Setting detail: OBSERVATION
Discharge: HOME | End: 2022-04-11
Attending: EMERGENCY MEDICINE | Admitting: EMERGENCY MEDICINE

## 2022-04-10 ENCOUNTER — APPOINTMENT (OUTPATIENT)
Dept: MRI IMAGING | Facility: HOSPITAL | Age: 67
End: 2022-04-10

## 2022-04-10 DIAGNOSIS — R42 DYSEQUILIBRIUM: Primary | ICD-10-CM

## 2022-04-10 DIAGNOSIS — R20.0 NUMBNESS AND TINGLING IN LEFT ARM: ICD-10-CM

## 2022-04-10 DIAGNOSIS — R20.2 NUMBNESS AND TINGLING IN LEFT ARM: ICD-10-CM

## 2022-04-10 PROBLEM — G45.9 TIA (TRANSIENT ISCHEMIC ATTACK): Status: ACTIVE | Noted: 2022-04-10

## 2022-04-10 LAB
ALBUMIN SERPL-MCNC: 3.8 G/DL (ref 3.5–5.2)
ALBUMIN SERPL-MCNC: 4.3 G/DL (ref 3.5–5.2)
ALBUMIN/GLOB SERPL: 1.2 G/DL
ALBUMIN/GLOB SERPL: 1.7 G/DL
ALP SERPL-CCNC: 67 U/L (ref 39–117)
ALP SERPL-CCNC: 68 U/L (ref 39–117)
ALT SERPL W P-5'-P-CCNC: 15 U/L (ref 1–33)
ALT SERPL W P-5'-P-CCNC: 17 U/L (ref 1–33)
ANION GAP SERPL CALCULATED.3IONS-SCNC: 8 MMOL/L (ref 5–15)
ANION GAP SERPL CALCULATED.3IONS-SCNC: 9 MMOL/L (ref 5–15)
AST SERPL-CCNC: 21 U/L (ref 1–32)
AST SERPL-CCNC: 21 U/L (ref 1–32)
BASOPHILS # BLD AUTO: 0.07 10*3/MM3 (ref 0–0.2)
BASOPHILS NFR BLD AUTO: 1.4 % (ref 0–1.5)
BILIRUB SERPL-MCNC: 0.4 MG/DL (ref 0–1.2)
BILIRUB SERPL-MCNC: 0.4 MG/DL (ref 0–1.2)
BILIRUB UR QL STRIP: NEGATIVE
BUN SERPL-MCNC: 20 MG/DL (ref 8–23)
BUN SERPL-MCNC: 22 MG/DL (ref 8–23)
BUN/CREAT SERPL: 25.6 (ref 7–25)
BUN/CREAT SERPL: 29.7 (ref 7–25)
CALCIUM SPEC-SCNC: 9.2 MG/DL (ref 8.6–10.5)
CALCIUM SPEC-SCNC: 9.2 MG/DL (ref 8.6–10.5)
CHLORIDE SERPL-SCNC: 103 MMOL/L (ref 98–107)
CHLORIDE SERPL-SCNC: 105 MMOL/L (ref 98–107)
CLARITY UR: CLEAR
CO2 SERPL-SCNC: 26 MMOL/L (ref 22–29)
CO2 SERPL-SCNC: 27 MMOL/L (ref 22–29)
COLOR UR: YELLOW
CREAT SERPL-MCNC: 0.74 MG/DL (ref 0.57–1)
CREAT SERPL-MCNC: 0.78 MG/DL (ref 0.57–1)
DEPRECATED RDW RBC AUTO: 40.9 FL (ref 37–54)
DEPRECATED RDW RBC AUTO: 42.5 FL (ref 37–54)
EGFRCR SERPLBLD CKD-EPI 2021: 82.3 ML/MIN/1.73
EGFRCR SERPLBLD CKD-EPI 2021: 87.7 ML/MIN/1.73
EOSINOPHIL # BLD AUTO: 0.57 10*3/MM3 (ref 0–0.4)
EOSINOPHIL NFR BLD AUTO: 11.7 % (ref 0.3–6.2)
ERYTHROCYTE [DISTWIDTH] IN BLOOD BY AUTOMATED COUNT: 12.3 % (ref 12.3–15.4)
ERYTHROCYTE [DISTWIDTH] IN BLOOD BY AUTOMATED COUNT: 12.4 % (ref 12.3–15.4)
GLOBULIN UR ELPH-MCNC: 2.5 GM/DL
GLOBULIN UR ELPH-MCNC: 3.1 GM/DL
GLUCOSE BLDC GLUCOMTR-MCNC: 66 MG/DL (ref 70–130)
GLUCOSE BLDC GLUCOMTR-MCNC: 95 MG/DL (ref 70–130)
GLUCOSE SERPL-MCNC: 91 MG/DL (ref 65–99)
GLUCOSE SERPL-MCNC: 93 MG/DL (ref 65–99)
GLUCOSE UR STRIP-MCNC: NEGATIVE MG/DL
HCT VFR BLD AUTO: 39.5 % (ref 34–46.6)
HCT VFR BLD AUTO: 39.6 % (ref 34–46.6)
HGB BLD-MCNC: 12.7 G/DL (ref 12–15.9)
HGB BLD-MCNC: 13.5 G/DL (ref 12–15.9)
HGB UR QL STRIP.AUTO: NEGATIVE
IMM GRANULOCYTES # BLD AUTO: 0 10*3/MM3 (ref 0–0.05)
IMM GRANULOCYTES NFR BLD AUTO: 0 % (ref 0–0.5)
KETONES UR QL STRIP: NEGATIVE
LEUKOCYTE ESTERASE UR QL STRIP.AUTO: NEGATIVE
LYMPHOCYTES # BLD AUTO: 1.62 10*3/MM3 (ref 0.7–3.1)
LYMPHOCYTES NFR BLD AUTO: 33.2 % (ref 19.6–45.3)
MCH RBC QN AUTO: 29.9 PG (ref 26.6–33)
MCH RBC QN AUTO: 30.9 PG (ref 26.6–33)
MCHC RBC AUTO-ENTMCNC: 32.2 G/DL (ref 31.5–35.7)
MCHC RBC AUTO-ENTMCNC: 34.1 G/DL (ref 31.5–35.7)
MCV RBC AUTO: 90.6 FL (ref 79–97)
MCV RBC AUTO: 92.9 FL (ref 79–97)
MONOCYTES # BLD AUTO: 0.5 10*3/MM3 (ref 0.1–0.9)
MONOCYTES NFR BLD AUTO: 10.2 % (ref 5–12)
NEUTROPHILS NFR BLD AUTO: 2.12 10*3/MM3 (ref 1.7–7)
NEUTROPHILS NFR BLD AUTO: 43.5 % (ref 42.7–76)
NITRITE UR QL STRIP: NEGATIVE
NRBC BLD AUTO-RTO: 0 /100 WBC (ref 0–0.2)
PH UR STRIP.AUTO: 7.5 [PH] (ref 5–8)
PLATELET # BLD AUTO: 230 10*3/MM3 (ref 140–450)
PLATELET # BLD AUTO: 254 10*3/MM3 (ref 140–450)
PMV BLD AUTO: 10 FL (ref 6–12)
PMV BLD AUTO: 10.3 FL (ref 6–12)
POTASSIUM SERPL-SCNC: 4.1 MMOL/L (ref 3.5–5.2)
POTASSIUM SERPL-SCNC: 4.5 MMOL/L (ref 3.5–5.2)
PROT SERPL-MCNC: 6.8 G/DL (ref 6–8.5)
PROT SERPL-MCNC: 6.9 G/DL (ref 6–8.5)
PROT UR QL STRIP: NEGATIVE
RBC # BLD AUTO: 4.25 10*6/MM3 (ref 3.77–5.28)
RBC # BLD AUTO: 4.37 10*6/MM3 (ref 3.77–5.28)
SARS-COV-2 RNA RESP QL NAA+PROBE: NOT DETECTED
SODIUM SERPL-SCNC: 138 MMOL/L (ref 136–145)
SODIUM SERPL-SCNC: 140 MMOL/L (ref 136–145)
SP GR UR STRIP: <=1.005 (ref 1–1.03)
UROBILINOGEN UR QL STRIP: NORMAL
WBC NRBC COR # BLD: 4.88 10*3/MM3 (ref 3.4–10.8)
WBC NRBC COR # BLD: 5.58 10*3/MM3 (ref 3.4–10.8)

## 2022-04-10 PROCEDURE — 99284 EMERGENCY DEPT VISIT MOD MDM: CPT

## 2022-04-10 PROCEDURE — A9577 INJ MULTIHANCE: HCPCS | Performed by: EMERGENCY MEDICINE

## 2022-04-10 PROCEDURE — 85027 COMPLETE CBC AUTOMATED: CPT | Performed by: NURSE PRACTITIONER

## 2022-04-10 PROCEDURE — 70498 CT ANGIOGRAPHY NECK: CPT

## 2022-04-10 PROCEDURE — 70450 CT HEAD/BRAIN W/O DYE: CPT

## 2022-04-10 PROCEDURE — 85025 COMPLETE CBC W/AUTO DIFF WBC: CPT | Performed by: EMERGENCY MEDICINE

## 2022-04-10 PROCEDURE — 0 IOPAMIDOL PER 1 ML: Performed by: EMERGENCY MEDICINE

## 2022-04-10 PROCEDURE — G0378 HOSPITAL OBSERVATION PER HR: HCPCS

## 2022-04-10 PROCEDURE — 0 GADOBENATE DIMEGLUMINE 529 MG/ML SOLUTION: Performed by: EMERGENCY MEDICINE

## 2022-04-10 PROCEDURE — 70553 MRI BRAIN STEM W/O & W/DYE: CPT

## 2022-04-10 PROCEDURE — U0005 INFEC AGEN DETEC AMPLI PROBE: HCPCS | Performed by: EMERGENCY MEDICINE

## 2022-04-10 PROCEDURE — U0003 INFECTIOUS AGENT DETECTION BY NUCLEIC ACID (DNA OR RNA); SEVERE ACUTE RESPIRATORY SYNDROME CORONAVIRUS 2 (SARS-COV-2) (CORONAVIRUS DISEASE [COVID-19]), AMPLIFIED PROBE TECHNIQUE, MAKING USE OF HIGH THROUGHPUT TECHNOLOGIES AS DESCRIBED BY CMS-2020-01-R: HCPCS | Performed by: EMERGENCY MEDICINE

## 2022-04-10 PROCEDURE — 81003 URINALYSIS AUTO W/O SCOPE: CPT | Performed by: EMERGENCY MEDICINE

## 2022-04-10 PROCEDURE — 80053 COMPREHEN METABOLIC PANEL: CPT | Performed by: NURSE PRACTITIONER

## 2022-04-10 PROCEDURE — 82962 GLUCOSE BLOOD TEST: CPT

## 2022-04-10 PROCEDURE — 70496 CT ANGIOGRAPHY HEAD: CPT

## 2022-04-10 PROCEDURE — 99204 OFFICE O/P NEW MOD 45 MIN: CPT | Performed by: PSYCHIATRY & NEUROLOGY

## 2022-04-10 PROCEDURE — 80053 COMPREHEN METABOLIC PANEL: CPT | Performed by: EMERGENCY MEDICINE

## 2022-04-10 RX ORDER — FLUTICASONE PROPIONATE 50 MCG
2 SPRAY, SUSPENSION (ML) NASAL DAILY
Status: DISCONTINUED | OUTPATIENT
Start: 2022-04-10 | End: 2022-04-11 | Stop reason: HOSPADM

## 2022-04-10 RX ORDER — ASPIRIN 325 MG
325 TABLET ORAL DAILY
Status: DISCONTINUED | OUTPATIENT
Start: 2022-04-10 | End: 2022-04-11 | Stop reason: HOSPADM

## 2022-04-10 RX ORDER — ONDANSETRON 2 MG/ML
4 INJECTION INTRAMUSCULAR; INTRAVENOUS EVERY 6 HOURS PRN
Status: DISCONTINUED | OUTPATIENT
Start: 2022-04-10 | End: 2022-04-11 | Stop reason: HOSPADM

## 2022-04-10 RX ORDER — SODIUM CHLORIDE 0.9 % (FLUSH) 0.9 %
10 SYRINGE (ML) INJECTION EVERY 12 HOURS SCHEDULED
Status: DISCONTINUED | OUTPATIENT
Start: 2022-04-10 | End: 2022-04-11 | Stop reason: HOSPADM

## 2022-04-10 RX ORDER — SODIUM CHLORIDE 0.9 % (FLUSH) 0.9 %
10 SYRINGE (ML) INJECTION AS NEEDED
Status: DISCONTINUED | OUTPATIENT
Start: 2022-04-10 | End: 2022-04-11 | Stop reason: HOSPADM

## 2022-04-10 RX ORDER — ASPIRIN 300 MG/1
300 SUPPOSITORY RECTAL DAILY
Status: DISCONTINUED | OUTPATIENT
Start: 2022-04-10 | End: 2022-04-11 | Stop reason: HOSPADM

## 2022-04-10 RX ORDER — MULTIPLE VITAMINS W/ MINERALS TAB 9MG-400MCG
1 TAB ORAL DAILY
Status: DISCONTINUED | OUTPATIENT
Start: 2022-04-10 | End: 2022-04-11 | Stop reason: HOSPADM

## 2022-04-10 RX ORDER — FLUTICASONE PROPIONATE 50 MCG
2 SPRAY, SUSPENSION (ML) NASAL DAILY
COMMUNITY

## 2022-04-10 RX ORDER — ATORVASTATIN CALCIUM 80 MG/1
80 TABLET, FILM COATED ORAL NIGHTLY
Status: DISCONTINUED | OUTPATIENT
Start: 2022-04-10 | End: 2022-04-11 | Stop reason: HOSPADM

## 2022-04-10 RX ADMIN — Medication 10 ML: at 14:23

## 2022-04-10 RX ADMIN — ASPIRIN 325 MG: 325 TABLET ORAL at 16:05

## 2022-04-10 RX ADMIN — IOPAMIDOL 95 ML: 755 INJECTION, SOLUTION INTRAVENOUS at 14:56

## 2022-04-10 RX ADMIN — ATORVASTATIN CALCIUM 80 MG: 80 TABLET, FILM COATED ORAL at 21:16

## 2022-04-10 RX ADMIN — Medication 10 ML: at 21:16

## 2022-04-10 RX ADMIN — GADOBENATE DIMEGLUMINE 13 ML: 529 INJECTION, SOLUTION INTRAVENOUS at 19:37

## 2022-04-10 NOTE — ED TRIAGE NOTES
"Pt reports \"I feel off\" Pt reports generalized weakness and left arm heaviness x2wks.     Pt was wearing a mask during assessment.  This RN wore appropriate PPE    "

## 2022-04-10 NOTE — NURSING NOTE
BSL 66. APRN notified. Has passed swallow test in ER. Food and fluids given. Will monitor BSL in 15 minutes.

## 2022-04-10 NOTE — H&P
"    Patient Name:  Arin Lyons  YOB: 1955  MRN:  [id number]  Admit Date:  4/10/2022  Patient Care Team:  Marylou Hughes APRN as PCP - General (Internal Medicine)  Gwen Pérez MD as Consulting Physician (Obstetrics and Gynecology)      Chief Complaint   Patient presents with   • Weakness - Generalized       Subjective     MsLissette Lyons is a 67 y.o. female with a history of skin cancer, arthritis that presents to [facility] with multiple complaints. Patient reports two week history of intermittent left arm numbness and tingling, dizziness, and \"feeling off balance.\" Patient reports both history of arthritis of her neck and history of vertigo in the past, but hasn't had problems with this in several years. She reports any movement seems to make her dizziness worse but denies chest pain, headache, unilateral weakness, visual disturbances, abdominal pain, nausea or vomiting. CT head done in ED was negative and labs were unremarkable. We were asked to admit to observation for further TIA workup and neurology was consulted.     History of Present Illness    Past Medical History:   Diagnosis Date   • Arthritis ongoing   • Cancer (HCC)     skin cancer removed 32yrs ago   • Osteopenia 04/2021   • Scoliosis 04/2021     Past Surgical History:   Procedure Laterality Date   • COLONOSCOPY  11/06/2019   • SKIN CANCER EXCISION Right 1990     Family History   Problem Relation Age of Onset   • Hypertension Mother    • Depression Mother    • Stroke Father    • Rheum arthritis Maternal Aunt    • Arthritis Maternal Aunt         Rheumatoid   • Heart failure Maternal Grandmother    • Thyroid disease Maternal Grandmother    • Autoimmune disease Sister    • Alzheimer's disease Sister      Social History     Tobacco Use   • Smoking status: Former Smoker     Packs/day: 0.25     Years: 2.00     Pack years: 0.50     Types: Cigarettes   • Smokeless tobacco: Never Used   • Tobacco comment: quit 40+ yrs ago "   Vaping Use   • Vaping Use: Never used   Substance Use Topics   • Alcohol use: Yes     Alcohol/week: 2.0 standard drinks     Types: 2 Glasses of wine per week     Comment: social drinks   • Drug use: No     Medications Prior to Admission   Medication Sig Dispense Refill Last Dose   • Dietary Management Product (Fosteum Plus) capsule Take 1 capsule by mouth Every 12 (Twelve) Hours.   Past Week at Unknown time   • fluticasone (FLONASE) 50 MCG/ACT nasal spray 2 sprays into the nostril(s) as directed by provider Daily.   4/9/2022 at Unknown time   • ibuprofen (ADVIL,MOTRIN) 200 MG tablet Take 200 mg by mouth Every 6 (Six) Hours As Needed for mild pain (1-3).   Past Week at Unknown time   • multivitamin with minerals tablet tablet Take 1 tablet by mouth Daily.   4/10/2022 at Unknown time   • naproxen sodium (ALEVE) 220 MG tablet Take 220 mg by mouth 2 (Two) Times a Day As Needed for mild pain (1-3).   4/9/2022 at Unknown time   • Pseudoephedrine HCl (DECONGESTANT PO) Take 1 tablet/day by mouth.   4/10/2022 at Unknown time   • VITAMIN D PO Take 2,500 Units by mouth Daily.   4/10/2022 at Unknown time     Allergies:  No Known Allergies    Review of Systems   All other systems reviewed and are negative.       Objective    Vital Signs  Temp:  [98.2 °F (36.8 °C)-98.9 °F (37.2 °C)] 98.4 °F (36.9 °C)  Heart Rate:  [59-91] 76  Resp:  [16] 16  BP: (122-157)/(73-86) 157/86  SpO2:  [99 %-100 %] 100 %  on   ;   Device (Oxygen Therapy): room air  Body mass index is 21.72 kg/m².    Physical Exam  Vitals and nursing note reviewed.   Constitutional:       General: She is not in acute distress.     Appearance: Normal appearance. She is normal weight.   HENT:      Head: Normocephalic and atraumatic.      Nose: Nose normal.      Mouth/Throat:      Mouth: Mucous membranes are moist.   Eyes:      Extraocular Movements: Extraocular movements intact.   Cardiovascular:      Rate and Rhythm: Normal rate and regular rhythm.      Pulses: Normal  pulses.      Heart sounds: Normal heart sounds.   Pulmonary:      Effort: Pulmonary effort is normal. No respiratory distress.      Breath sounds: Normal breath sounds.   Abdominal:      General: Abdomen is flat. Bowel sounds are normal.      Palpations: Abdomen is soft.   Musculoskeletal:         General: Normal range of motion.      Cervical back: Normal range of motion and neck supple. No rigidity.   Skin:     General: Skin is warm and dry.   Neurological:      General: No focal deficit present.      Mental Status: She is alert and oriented to person, place, and time. Mental status is at baseline.   Psychiatric:         Mood and Affect: Mood normal.         Behavior: Behavior normal.         Thought Content: Thought content normal.         Judgment: Judgment normal.         Results Review:   I reviewed the patient's new clinical results including all labs and xrays.    Lab Results (last 24 hours)     Procedure Component Value Units Date/Time    CBC & Differential [[id number]]  (Abnormal) Collected: 04/10/22 1126    Specimen: Blood Updated: 04/10/22 1147    Narrative:      The following orders were created for panel order CBC & Differential.  Procedure                               Abnormality         Status                     ---------                               -----------         ------                     CBC Auto Differential[[id number]]        Abnormal            Final result                 Please view results for these tests on the individual orders.    Comprehensive Metabolic Panel [[id number]]  (Abnormal) Collected: 04/10/22 1126    Specimen: Blood Updated: 04/10/22 1216     Glucose 93 mg/dL      BUN 22 mg/dL      Creatinine 0.74 mg/dL      Sodium 140 mmol/L      Potassium 4.5 mmol/L      Chloride 105 mmol/L      CO2 27.0 mmol/L      Calcium 9.2 mg/dL      Total Protein 6.9 g/dL      Albumin 3.80 g/dL      ALT (SGPT) 17 U/L      AST (SGOT) 21 U/L      Alkaline Phosphatase 68 U/L      Total Bilirubin 0.4  mg/dL      Globulin 3.1 gm/dL      A/G Ratio 1.2 g/dL      BUN/Creatinine Ratio 29.7     Anion Gap 8.0 mmol/L      eGFR 87.7 mL/min/1.73      Comment: National Kidney Foundation and American Society of Nephrology (ASN) Task Force recommended calculation based on the Chronic Kidney Disease Epidemiology Collaboration (CKD-EPI) equation refit without adjustment for race.       Narrative:      GFR Normal >60  Chronic Kidney Disease <60  Kidney Failure <15      CBC Auto Differential [[id number]]  (Abnormal) Collected: 04/10/22 1126    Specimen: Blood Updated: 04/10/22 1147     WBC 4.88 10*3/mm3      RBC 4.25 10*6/mm3      Hemoglobin 12.7 g/dL      Hematocrit 39.5 %      MCV 92.9 fL      MCH 29.9 pg      MCHC 32.2 g/dL      RDW 12.4 %      RDW-SD 42.5 fl      MPV 10.3 fL      Platelets 254 10*3/mm3      Neutrophil % 43.5 %      Lymphocyte % 33.2 %      Monocyte % 10.2 %      Eosinophil % 11.7 %      Basophil % 1.4 %      Immature Grans % 0.0 %      Neutrophils, Absolute 2.12 10*3/mm3      Lymphocytes, Absolute 1.62 10*3/mm3      Monocytes, Absolute 0.50 10*3/mm3      Eosinophils, Absolute 0.57 10*3/mm3      Basophils, Absolute 0.07 10*3/mm3      Immature Grans, Absolute 0.00 10*3/mm3      nRBC 0.0 /100 WBC     Urinalysis With Microscopic If Indicated (No Culture) - Urine, Clean Catch [[id number]]  (Normal) Collected: 04/10/22 1215    Specimen: Urine, Clean Catch Updated: 04/10/22 1241     Color, UA Yellow     Appearance, UA Clear     pH, UA 7.5     Specific Gravity, UA <=1.005     Glucose, UA Negative     Ketones, UA Negative     Bilirubin, UA Negative     Blood, UA Negative     Protein, UA Negative     Leuk Esterase, UA Negative     Nitrite, UA Negative     Urobilinogen, UA 0.2 E.U./dL    Narrative:      Urine microscopic not indicated.    COVID PRE-OP / PRE-PROCEDURE SCREENING ORDER (NO ISOLATION) - Swab, Nasopharynx [[id number]]  (Normal) Collected: 04/10/22 1327    Specimen: Swab from Nasopharynx Updated: 04/10/22 1413     Narrative:      The following orders were created for panel order COVID PRE-OP / PRE-PROCEDURE SCREENING ORDER (NO ISOLATION) - Swab, Nasopharynx.  Procedure                               Abnormality         Status                     ---------                               -----------         ------                     COVID-19,[facility] PHIL IN-HOUSE...[[id number]]  Normal              Final result                 Please view results for these tests on the individual orders.    COVID-19,[facility] IN-HOUSE CEPHEID/ROB NP SWAB IN TRANSPORT MEDIA 8-12 HR TAT - Swab, Nasopharynx [[id number]]  (Normal) Collected: 04/10/22 1327    Specimen: Swab from Nasopharynx Updated: 04/10/22 1413     COVID19 Not Detected    Narrative:      Fact sheet for providers: https://www.fda.gov/media/645128/download     Fact sheet for patients: https://www.fda.gov/media/016575/download    POC Glucose Once [[id number]]  (Abnormal) Collected: 04/10/22 1554    Specimen: Blood Updated: 04/10/22 1556     Glucose 66 mg/dL      Comment: Meter: [id number] : [id number] Shima Yadav RN             CT Angiogram Neck   Final Result   1. No hemodynamically significant focal stenosis, aneurysm, or   dissection in the cervical carotid or vertebral arteries or in the   arteries at the base of the brain.       2. No acute intracranial hemorrhage or hydrocephalus. No enhancing   lesion in brain. If there is further clinical concern, MRI could be   considered for further evaluation.       This report was finalized on 4/10/2022 3:31 PM by Dr. Thor Olsen M.D.          CT Angiogram Head   Final Result   1. No hemodynamically significant focal stenosis, aneurysm, or   dissection in the cervical carotid or vertebral arteries or in the   arteries at the base of the brain.       2. No acute intracranial hemorrhage or hydrocephalus. No enhancing   lesion in brain. If there is further clinical concern, MRI could be   considered for further evaluation.       This  report was finalized on 4/10/2022 3:31 PM by Dr. Thor Olsen M.D.          CT Head Without Contrast   Final Result       No acute intracranial hemorrhage or hydrocephalus. If there is further   clinical concern, MRI could be considered for further evaluation.       This report was finalized on 4/10/2022 12:53 PM by Dr. Thor Olsen M.D.          MRI Brain With & Without Contrast    (Results Pending)   MRI Internal Auditory Canal With Contrast    (Results Pending)     Assessment/Plan      Active Hospital Problems    Diagnosis  POA   • TIA (transient ischemic attack) [G45.9]  Yes      Resolved Hospital Problems   No resolved problems to display.     TIA  -neurology consult  -CTA head/neck negative  -MRI pending  -lipids/a1c  -statin/aspirin  -echo pending  -continue neuro checks  -meclizine PRN dizziness    VTE Ppx  -SCDs    CODE status  -full    I discussed the patients findings and my recommendations with patient.    NARCISA Moctezuma  [facility]  04/10/22  4:28 PM EDT

## 2022-04-10 NOTE — ED PROVIDER NOTES
EMERGENCY DEPARTMENT ENCOUNTER    Room Number:  114/1  Date of encounter:  4/10/2022  PCP: Marylou Hughes APRN  Historian: Patient      HPI:  Chief Complaint: Multiple complaints  A complete HPI/ROS/PMH/PSH/SH/FH are unobtainable due to: None    Context: Arin Lyons is a 67 y.o. female who presents to the ED c/o multiple complaints over the last week.  Patient complains of disequilibrium and being dizzy and off-balance.  This is been moderate in severity and its off and on.  It seems to be worse when she tries to walk around she said that her feet sometimes do not feel like they are working right.  It is clearly bilateral, and there is no pattern to it.    Patient also complains of some intermittent left arm tingling and numbness.  This also seems to come and go on its own and is not necessarily associated with the disequilibrium.  Currently they are both mild, and she does not have a headache, fever or any other associated symptoms.      PAST MEDICAL HISTORY  Active Ambulatory Problems     Diagnosis Date Noted   • Carotid stenosis, asymptomatic, bilateral 08/20/2019   • Arthritis of foot 09/24/2020   • Closed nondisplaced fracture of second metatarsal bone of right foot 09/24/2020   • Closed nondisplaced fracture of third metatarsal bone of right foot 09/24/2020   • Vitamin D deficiency 09/24/2020   • Lumbar radiculopathy 11/28/2021   • Osteoporosis without current pathological fracture 11/28/2021   • Neck pain on left side 11/28/2021   • Vertigo 11/28/2021   • Degenerative disc disease, lumbar 12/12/2021     Resolved Ambulatory Problems     Diagnosis Date Noted   • No Resolved Ambulatory Problems     Past Medical History:   Diagnosis Date   • Arthritis ongoing   • Cancer (HCC)    • Osteopenia 04/2021   • Scoliosis 04/2021         PAST SURGICAL HISTORY  Past Surgical History:   Procedure Laterality Date   • COLONOSCOPY  11/06/2019   • SKIN CANCER EXCISION Right 1990         FAMILY HISTORY  Family History    Problem Relation Age of Onset   • Hypertension Mother    • Depression Mother    • Stroke Father    • Rheum arthritis Maternal Aunt    • Arthritis Maternal Aunt         Rheumatoid   • Heart failure Maternal Grandmother    • Thyroid disease Maternal Grandmother    • Autoimmune disease Sister    • Alzheimer's disease Sister          SOCIAL HISTORY  Social History     Socioeconomic History   • Marital status:    Tobacco Use   • Smoking status: Former Smoker     Packs/day: 0.25     Years: 2.00     Pack years: 0.50     Types: Cigarettes   • Smokeless tobacco: Never Used   • Tobacco comment: quit 40+ yrs ago   Vaping Use   • Vaping Use: Never used   Substance and Sexual Activity   • Alcohol use: Yes     Alcohol/week: 2.0 standard drinks     Types: 2 Glasses of wine per week     Comment: social drinks   • Drug use: No   • Sexual activity: Yes     Partners: Male     Birth control/protection: Post-menopausal         ALLERGIES  Patient has no known allergies.        REVIEW OF SYSTEMS  Review of Systems     All systems reviewed and negative except for those discussed in HPI.       PHYSICAL EXAM    I have reviewed the triage vital signs and nursing notes.    ED Triage Vitals [04/10/22 1034]   Temp Heart Rate Resp BP SpO2   98.9 °F (37.2 °C) 91 16 -- 100 %      Temp src Heart Rate Source Patient Position BP Location FiO2 (%)   Tympanic Monitor -- -- --       Physical Exam  GENERAL: not distressed  HENT: nares patent  EYES: no scleral icterus  CV: regular rhythm, regular rate  RESPIRATORY: normal effort  ABDOMEN: soft  MUSCULOSKELETAL: no deformity  NEURO: alert, moves all extremities, follows commands, normal neuro exam with a NIH of 0  SKIN: warm, dry        LAB RESULTS  Recent Results (from the past 24 hour(s))   Comprehensive Metabolic Panel    Collection Time: 04/10/22 11:26 AM    Specimen: Blood   Result Value Ref Range    Glucose 93 65 - 99 mg/dL    BUN 22 8 - 23 mg/dL    Creatinine 0.74 0.57 - 1.00 mg/dL    Sodium  140 136 - 145 mmol/L    Potassium 4.5 3.5 - 5.2 mmol/L    Chloride 105 98 - 107 mmol/L    CO2 27.0 22.0 - 29.0 mmol/L    Calcium 9.2 8.6 - 10.5 mg/dL    Total Protein 6.9 6.0 - 8.5 g/dL    Albumin 3.80 3.50 - 5.20 g/dL    ALT (SGPT) 17 1 - 33 U/L    AST (SGOT) 21 1 - 32 U/L    Alkaline Phosphatase 68 39 - 117 U/L    Total Bilirubin 0.4 0.0 - 1.2 mg/dL    Globulin 3.1 gm/dL    A/G Ratio 1.2 g/dL    BUN/Creatinine Ratio 29.7 (H) 7.0 - 25.0    Anion Gap 8.0 5.0 - 15.0 mmol/L    eGFR 87.7 >60.0 mL/min/1.73   CBC Auto Differential    Collection Time: 04/10/22 11:26 AM    Specimen: Blood   Result Value Ref Range    WBC 4.88 3.40 - 10.80 10*3/mm3    RBC 4.25 3.77 - 5.28 10*6/mm3    Hemoglobin 12.7 12.0 - 15.9 g/dL    Hematocrit 39.5 34.0 - 46.6 %    MCV 92.9 79.0 - 97.0 fL    MCH 29.9 26.6 - 33.0 pg    MCHC 32.2 31.5 - 35.7 g/dL    RDW 12.4 12.3 - 15.4 %    RDW-SD 42.5 37.0 - 54.0 fl    MPV 10.3 6.0 - 12.0 fL    Platelets 254 140 - 450 10*3/mm3    Neutrophil % 43.5 42.7 - 76.0 %    Lymphocyte % 33.2 19.6 - 45.3 %    Monocyte % 10.2 5.0 - 12.0 %    Eosinophil % 11.7 (H) 0.3 - 6.2 %    Basophil % 1.4 0.0 - 1.5 %    Immature Grans % 0.0 0.0 - 0.5 %    Neutrophils, Absolute 2.12 1.70 - 7.00 10*3/mm3    Lymphocytes, Absolute 1.62 0.70 - 3.10 10*3/mm3    Monocytes, Absolute 0.50 0.10 - 0.90 10*3/mm3    Eosinophils, Absolute 0.57 (H) 0.00 - 0.40 10*3/mm3    Basophils, Absolute 0.07 0.00 - 0.20 10*3/mm3    Immature Grans, Absolute 0.00 0.00 - 0.05 10*3/mm3    nRBC 0.0 0.0 - 0.2 /100 WBC   Urinalysis With Microscopic If Indicated (No Culture) - Urine, Clean Catch    Collection Time: 04/10/22 12:15 PM    Specimen: Urine, Clean Catch   Result Value Ref Range    Color, UA Yellow Yellow, Straw    Appearance, UA Clear Clear    pH, UA 7.5 5.0 - 8.0    Specific Gravity, UA <=1.005 1.005 - 1.030    Glucose, UA Negative Negative    Ketones, UA Negative Negative    Bilirubin, UA Negative Negative    Blood, UA Negative Negative    Protein, UA  Negative Negative    Leuk Esterase, UA Negative Negative    Nitrite, UA Negative Negative    Urobilinogen, UA 0.2 E.U./dL 0.2 - 1.0 E.U./dL   COVID-19,[facility] PHIL IN-HOUSE CEPHEID/ROB NP SWAB IN TRANSPORT MEDIA 8-12 HR TAT - Swab, Nasopharynx    Collection Time: 04/10/22  1:27 PM    Specimen: Nasopharynx; Swab   Result Value Ref Range    COVID19 Not Detected Not Detected - Ref. Range       Ordered the above labs and independently reviewed the results.        RADIOLOGY  CT Head Without Contrast    Result Date: 4/10/2022  CT HEAD WO CONTRAST-  INDICATIONS: Left arm weakness  TECHNIQUE: Radiation dose reduction techniques were utilized, including automated exposure control and exposure modulation based on body size. Noncontrast head CT  COMPARISON: None available  FINDINGS:  No acute intracranial hemorrhage, midline shift or mass effect. No acute territorial infarct is identified.  Mild periventricular hypodensities suggest chronic small vessel ischemic change in a patient this age.  Arterial calcifications are seen at the base of the brain.  Ventricles, cisterns, cerebral sulci are unremarkable for patient age.  The visualized paranasal sinuses, orbits, mastoid air cells are unremarkable.        No acute intracranial hemorrhage or hydrocephalus. If there is further clinical concern, MRI could be considered for further evaluation.  This report was finalized on 4/10/2022 12:53 PM by Dr. Thor Olsen M.D.        I ordered the above noted radiological studies. Reviewed by me and discussed with radiologist.  See dictation for official radiology interpretation.      PROCEDURES    Procedures      MEDICATIONS GIVEN IN ER    Medications   sodium chloride 0.9 % flush 10 mL (has no administration in time range)         PROGRESS, DATA ANALYSIS, CONSULTS, AND MEDICAL DECISION MAKING    All labs have been independently reviewed by me.  All radiology studies have been reviewed by me and discussed with radiologist dictating the  report.   EKG's independently viewed and interpreted by me.  Discussion below represents my analysis of pertinent findings related to patient's condition, differential diagnosis, treatment plan and final disposition.        ED Course as of 04/10/22 1416   Sun Apr 10, 2022   1415 CBC and chemistry unremarkable [DP]   1415 Urinalysis [DP]   1415 CT scan negative [DP]   1416 I have a low suspicion that this is stroke, however I cannot necessarily explain the symptoms that she is having.  She does have a history of carotid stenosis according to the medical records and for that reason I am admitting her to the observation unit for further.  I did speak with Maggie in the observation unit who agrees to admit the patient.    tPA is not indicated as the NIH is 0 [DP]      ED Course User Index  [DP] Lewis Mcclellan MD           PPE: The patient wore a surgical mask throughout the entire patient encounter. I wore an N95.    AS OF 14:16 EDT VITALS:    BP - 122/82  HR - 59  TEMP - 98.2 °F (36.8 °C) (Oral)  O2 SATS - 100%        DIAGNOSIS  Final diagnoses:   Dysequilibrium   Numbness and tingling in left arm         DISPOSITION  Admit to ED observation unit           Lewis Mcclellan MD  04/10/22 1416

## 2022-04-10 NOTE — CONSULTS
"Neurology Consult Note    Consult Date: 4/10/2022    Referring MD: Dr. Zhang    Reason for Consult I have been asked to see the patient in neurological consultation to render advice and opinion regarding ataxia, left hand numbness    Arin Lyons is a 67 y.o. female with no significant past medical history who presented to the hospital complaining of ataxia.  She has a known history of benign positional vertigo.  She has seen ENT and typically gets an exacerbation around the spring and self treats with an Epley maneuver at home.  2 weeks ago she describes gradual onset of a vague sensation of disequilibrium affecting her balance.  She denies falls.  She noticed some numbness of the left hand typically in the evening waking her up from sleep.  If she shakes the hand out her symptoms improve.  She endorses associated left-sided tinnitus.    Past Medical History:   Diagnosis Date   • Arthritis ongoing   • Cancer (HCC)     skin cancer removed 32yrs ago   • Osteopenia 04/2021   • Scoliosis 04/2021       ROS:  No fevers, chills  No weakness, + numbness  + Vertigo, + tinnitus, no hearing loss    Exam  /82 (BP Location: Left arm, Patient Position: Lying)   Pulse 59   Temp 98.2 °F (36.8 °C) (Oral)   Resp 16   Ht 174 cm (68.5\")   Wt 65.8 kg (145 lb)   SpO2 100%   BMI 21.72 kg/m²   Gen: NAD, vitals reviewed  MS: oriented x3, recent/remote memory intact, normal attention/concentration, language intact, no neglect.  CN: visual acuity grossly normal, PERRL, EOMI, with no nystagmus, negative test of skew, positive head impulse test to the left, feels symptomatic with Harini-Hallpike to the left but no nystagmus, negative [place]-Hallpike to the right, hearing symmetric, no facial droop, no dysarthria  Motor: 5/5 throughout upper and lower extremities, normal tone  Coordination: Slight dysmetria of the left upper extremity  Sensory: Intact to cold temperature and vibration throughout, positive Phalen sign  Reflexes: 2+ " throughout  Gait: Unsteady station, positive Romberg sign, unable to maintain tandem gait    DATA:    Lab Results   Component Value Date    GLUCOSE 93 04/10/2022    CALCIUM 9.2 04/10/2022     04/10/2022    K 4.5 04/10/2022    CO2 27.0 04/10/2022     04/10/2022    BUN 22 04/10/2022    CREATININE 0.74 04/10/2022    EGFRIFAFRI 89 11/10/2021    EGFRIFNONA 77 11/10/2021    BCR 29.7 (H) 04/10/2022    ANIONGAP 8.0 04/10/2022     Lab Results   Component Value Date    WBC 4.88 04/10/2022    HGB 12.7 04/10/2022    HCT 39.5 04/10/2022    MCV 92.9 04/10/2022     04/10/2022       Lab review: CBC, BMP unremarkable    Imaging review: I personally reviewed her CTA head and neck which looks normal.  Radiology report reviewed.  MRI brain IAC ordered    Diagnoses:  Ataxia  Idiopathic vestibular neuropathy, left ear  Tinnitus  History of BPPV    Comment: 67-year-old female with history of BPPV who presents with vague disequilibrium.  Exam is consistent with left-sided vestibular neuropathy however she has some red flags including dysmetria and ataxia. Incidentally she also has carpal tunnel of the left hand which is mild and probably explains her left upper extremity dysmetria.  I would recommend MRI brain IAC protocol to exclude a central cause of her vestibular dysfunction. If negative she should be seen by PT and Epley should be attempted. She would probably benefit from vestibular PT in the outpatient setting if no stroke is found    PLAN:  MRI brain IAC protocol  PT consult for Ruth and Epley. Likely needs outpatient vestibular PT  She should see her ENT after discharge for an audiogram given her left sided tinnitus    Likely discharge tomorrow

## 2022-04-10 NOTE — CASE MANAGEMENT/SOCIAL WORK
Discharge Planning Assessment  [facility]     Patient Name: Arin Lyons  MRN: [id number]  Today's Date: 4/10/2022    Admit Date: 4/10/2022     Discharge Needs Assessment     Row Name 04/10/22 1628       Living Environment    People in Home spouse    Name(s) of People in Home Fred Lyons [phone]    Current Living Arrangements home    Primary Care Provided by self    Provides Primary Care For no one    Family Caregiver if Needed spouse    Family Caregiver Names Fred Lyons    Quality of Family Relationships helpful;involved;supportive    Able to Return to Prior Arrangements yes       Resource/Environmental Concerns    Resource/Environmental Concerns none    Transportation Concerns none       Transition Planning    Patient/Family Anticipates Transition to home    Patient/Family Anticipated Services at Transition none    Transportation Anticipated car, drives self;family or friend will provide       Discharge Needs Assessment    Readmission Within the Last 30 Days no previous admission in last 30 days    Equipment Currently Used at Home cane, straight    Concerns to be Addressed no discharge needs identified;denies needs/concerns at this time    Anticipated Changes Related to Illness none    Equipment Needed After Discharge none    Provided Post Acute Provider List? N/A    Provided Post Acute Provider Quality & Resource List? N/A               Discharge Plan     Row Name 04/10/22 1629       Plan    Plan Pt intends to return home upon discharge    Provided Post Acute Provider List? N/A    Provided Post Acute Provider Quality & Resource List? N/A    Plan Comments Face sheet verified, role of CCP explained. Pt is independent in ADL's and ambulates with a cane. Does not require any other asssitve devices. Pt denies any difficulty paying for medicaitons              Continued Care and Services - Admitted Since 4/10/2022    Coordination has not been started for this encounter.          Demographic Summary    No  documentation.                Functional Status    No documentation.                Psychosocial    No documentation.                Abuse/Neglect    No documentation.                Legal    No documentation.                Substance Abuse    No documentation.                Patient Forms    No documentation.                   Nelida Herndon RN

## 2022-04-11 ENCOUNTER — APPOINTMENT (OUTPATIENT)
Dept: CARDIOLOGY | Facility: HOSPITAL | Age: 67
End: 2022-04-11

## 2022-04-11 ENCOUNTER — READMISSION MANAGEMENT (OUTPATIENT)
Dept: CALL CENTER | Facility: HOSPITAL | Age: 67
End: 2022-04-11

## 2022-04-11 VITALS
DIASTOLIC BLOOD PRESSURE: 69 MMHG | TEMPERATURE: 98.06 F | WEIGHT: 145 LBS | HEIGHT: 69 IN | SYSTOLIC BLOOD PRESSURE: 114 MMHG | BODY MASS INDEX: 21.48 KG/M2 | RESPIRATION RATE: 16 BRPM | OXYGEN SATURATION: 96 % | HEART RATE: 78 BPM

## 2022-04-11 LAB
ALBUMIN SERPL-MCNC: 3.6 G/DL (ref 3.5–5.2)
ALBUMIN/GLOB SERPL: 1.2 G/DL
ALP SERPL-CCNC: 65 U/L (ref 39–117)
ALT SERPL W P-5'-P-CCNC: 14 U/L (ref 1–33)
ANION GAP SERPL CALCULATED.3IONS-SCNC: 8.9 MMOL/L (ref 5–15)
AORTIC DIMENSIONLESS INDEX: 0.7 (DI)
AST SERPL-CCNC: 25 U/L (ref 1–32)
BH CV ECHO MEAS - ACS: 1.96 CM
BH CV ECHO MEAS - AO MAX PG: 4.6 MMHG
BH CV ECHO MEAS - AO MEAN PG: 2.35 MMHG
BH CV ECHO MEAS - AO V2 MAX: 107.6 CM/SEC
BH CV ECHO MEAS - AO V2 VTI: 24.1 CM
BH CV ECHO MEAS - AVA(I,D): 2.9 CM2
BH CV ECHO MEAS - EDV(CUBED): 149.7 ML
BH CV ECHO MEAS - EDV(MOD-SP2): 102 ML
BH CV ECHO MEAS - EDV(MOD-SP4): 96 ML
BH CV ECHO MEAS - EF(MOD-BP): 48.7 %
BH CV ECHO MEAS - EF(MOD-SP2): 48 %
BH CV ECHO MEAS - EF(MOD-SP4): 47.9 %
BH CV ECHO MEAS - ESV(CUBED): 31.3 ML
BH CV ECHO MEAS - ESV(MOD-SP2): 53 ML
BH CV ECHO MEAS - ESV(MOD-SP4): 50 ML
BH CV ECHO MEAS - FS: 40.7 %
BH CV ECHO MEAS - IVS/LVPW: 0.8 CM
BH CV ECHO MEAS - IVSD: 0.83 CM
BH CV ECHO MEAS - LAT PEAK E' VEL: 7 CM/SEC
BH CV ECHO MEAS - LV DIASTOLIC VOL/BSA (35-75): 53.3 CM2
BH CV ECHO MEAS - LV MASS(C)D: 183.2 GRAMS
BH CV ECHO MEAS - LV MAX PG: 2.12 MMHG
BH CV ECHO MEAS - LV MEAN PG: 1.13 MMHG
BH CV ECHO MEAS - LV SYSTOLIC VOL/BSA (12-30): 27.8 CM2
BH CV ECHO MEAS - LV V1 MAX: 72.8 CM/SEC
BH CV ECHO MEAS - LV V1 VTI: 16.6 CM
BH CV ECHO MEAS - LVIDD: 5.3 CM
BH CV ECHO MEAS - LVIDS: 3.2 CM
BH CV ECHO MEAS - LVOT AREA: 4.2 CM2
BH CV ECHO MEAS - LVOT DIAM: 2.3 CM
BH CV ECHO MEAS - LVPWD: 1.04 CM
BH CV ECHO MEAS - MED PEAK E' VEL: 5.4 CM/SEC
BH CV ECHO MEAS - MV A DUR: 0.12 SEC
BH CV ECHO MEAS - MV A MAX VEL: 67.3 CM/SEC
BH CV ECHO MEAS - MV DEC SLOPE: 251.3 CM/SEC2
BH CV ECHO MEAS - MV DEC TIME: 0.24 MSEC
BH CV ECHO MEAS - MV E MAX VEL: 60 CM/SEC
BH CV ECHO MEAS - MV E/A: 0.89
BH CV ECHO MEAS - MV MAX PG: 2.5 MMHG
BH CV ECHO MEAS - MV MEAN PG: 1.06 MMHG
BH CV ECHO MEAS - MV V2 VTI: 23 CM
BH CV ECHO MEAS - MVA(VTI): 3 CM2
BH CV ECHO MEAS - PA ACC TIME: 0.13 SEC
BH CV ECHO MEAS - PA PR(ACCEL): 19.6 MMHG
BH CV ECHO MEAS - PA V2 MAX: 78.7 CM/SEC
BH CV ECHO MEAS - PULM A REVS DUR: 0.14 SEC
BH CV ECHO MEAS - PULM A REVS VEL: 25.7 CM/SEC
BH CV ECHO MEAS - PULM DIAS VEL: 29.4 CM/SEC
BH CV ECHO MEAS - PULM SYS VEL: 52.5 CM/SEC
BH CV ECHO MEAS - RAP SYSTOLE: 3 MMHG
BH CV ECHO MEAS - RV MAX PG: 0.47 MMHG
BH CV ECHO MEAS - RV V1 MAX: 34.3 CM/SEC
BH CV ECHO MEAS - RV V1 VTI: 8.1 CM
BH CV ECHO MEAS - SI(MOD-SP2): 27.2 ML/M2
BH CV ECHO MEAS - SI(MOD-SP4): 25.5 ML/M2
BH CV ECHO MEAS - SV(LVOT): 69 ML
BH CV ECHO MEAS - SV(MOD-SP2): 49 ML
BH CV ECHO MEAS - SV(MOD-SP4): 46 ML
BH CV ECHO MEAS - TAPSE (>1.6): 1.74 CM
BH CV ECHO MEASUREMENTS AVERAGE E/E' RATIO: 9.68
BH CV XLRA - RV BASE: 2.5 CM
BH CV XLRA - RV LENGTH: 6.2 CM
BH CV XLRA - RV MID: 1.88 CM
BH CV XLRA - TDI S': 11 CM/SEC
BILIRUB SERPL-MCNC: 0.6 MG/DL (ref 0–1.2)
BUN SERPL-MCNC: 16 MG/DL (ref 8–23)
BUN/CREAT SERPL: 21.6 (ref 7–25)
CALCIUM SPEC-SCNC: 9.1 MG/DL (ref 8.6–10.5)
CHLORIDE SERPL-SCNC: 105 MMOL/L (ref 98–107)
CHOLEST SERPL-MCNC: 201 MG/DL (ref 0–200)
CO2 SERPL-SCNC: 25.1 MMOL/L (ref 22–29)
CREAT SERPL-MCNC: 0.74 MG/DL (ref 0.57–1)
DEPRECATED RDW RBC AUTO: 41.8 FL (ref 37–54)
EGFRCR SERPLBLD CKD-EPI 2021: 87.7 ML/MIN/1.73
ERYTHROCYTE [DISTWIDTH] IN BLOOD BY AUTOMATED COUNT: 12.3 % (ref 12.3–15.4)
GLOBULIN UR ELPH-MCNC: 3.1 GM/DL
GLUCOSE SERPL-MCNC: 86 MG/DL (ref 65–99)
HBA1C MFR BLD: 5 % (ref 4.8–5.6)
HCT VFR BLD AUTO: 39.1 % (ref 34–46.6)
HDLC SERPL-MCNC: 71 MG/DL (ref 40–60)
HGB BLD-MCNC: 13.1 G/DL (ref 12–15.9)
LDLC SERPL CALC-MCNC: 119 MG/DL (ref 0–100)
LDLC/HDLC SERPL: 1.66 {RATIO}
LEFT ATRIUM VOLUME INDEX: 27.4 ML/M2
MAXIMAL PREDICTED HEART RATE: 151 BPM
MCH RBC QN AUTO: 31 PG (ref 26.6–33)
MCHC RBC AUTO-ENTMCNC: 33.5 G/DL (ref 31.5–35.7)
MCV RBC AUTO: 92.7 FL (ref 79–97)
PLATELET # BLD AUTO: 210 10*3/MM3 (ref 140–450)
PMV BLD AUTO: 10.6 FL (ref 6–12)
POTASSIUM SERPL-SCNC: 4.8 MMOL/L (ref 3.5–5.2)
PROT SERPL-MCNC: 6.7 G/DL (ref 6–8.5)
RBC # BLD AUTO: 4.22 10*6/MM3 (ref 3.77–5.28)
SINUS: 3.3 CM
SODIUM SERPL-SCNC: 139 MMOL/L (ref 136–145)
STRESS TARGET HR: 128 BPM
TRIGL SERPL-MCNC: 60 MG/DL (ref 0–150)
VLDLC SERPL-MCNC: 11 MG/DL (ref 5–40)
WBC NRBC COR # BLD: 4.36 10*3/MM3 (ref 3.4–10.8)

## 2022-04-11 PROCEDURE — 93306 TTE W/DOPPLER COMPLETE: CPT

## 2022-04-11 PROCEDURE — 80053 COMPREHEN METABOLIC PANEL: CPT | Performed by: NURSE PRACTITIONER

## 2022-04-11 PROCEDURE — 93306 TTE W/DOPPLER COMPLETE: CPT | Performed by: INTERNAL MEDICINE

## 2022-04-11 PROCEDURE — 85027 COMPLETE CBC AUTOMATED: CPT | Performed by: NURSE PRACTITIONER

## 2022-04-11 PROCEDURE — 80061 LIPID PANEL: CPT | Performed by: NURSE PRACTITIONER

## 2022-04-11 PROCEDURE — G0378 HOSPITAL OBSERVATION PER HR: HCPCS

## 2022-04-11 PROCEDURE — 97161 PT EVAL LOW COMPLEX 20 MIN: CPT

## 2022-04-11 PROCEDURE — 83036 HEMOGLOBIN GLYCOSYLATED A1C: CPT | Performed by: NURSE PRACTITIONER

## 2022-04-11 RX ORDER — MECLIZINE HYDROCHLORIDE 25 MG/1
25 TABLET ORAL 3 TIMES DAILY PRN
Qty: 30 TABLET | Refills: 0 | Status: SHIPPED | OUTPATIENT
Start: 2022-04-11 | End: 2022-05-23

## 2022-04-11 RX ADMIN — MULTIPLE VITAMINS W/ MINERALS TAB 1 TABLET: TAB at 08:26

## 2022-04-11 RX ADMIN — ASPIRIN 325 MG: 325 TABLET ORAL at 08:26

## 2022-04-11 RX ADMIN — Medication 10 ML: at 08:27

## 2022-04-11 NOTE — PLAN OF CARE
Problem: Adult Inpatient Plan of Care  Goal: Plan of Care Review  Outcome: Met   Goal Outcome Evaluation:   Patient alert and oriented. PT seen patient and evaluated. Patient being discharged home. Discharge paperwork given.

## 2022-04-11 NOTE — PLAN OF CARE
Goal Outcome Evaluation:  Plan of Care Reviewed With: patient           Outcome Evaluation: Pt is a 66 yo female who presented to ED with complaint of mild balance impairment for several weeks, intermittent L hand numbness, and ringing in L ear. Pt reports hx of BPPV in the past, and has seen an ENT for this. MRI negative for acute neurological process, suspected L carpal tunnel syndrome contributing to sensory impairment in L hand. Prior to admission, pt was living at home with spouse and independent with all mobility. Upon exam, demonstrates no balance impairment and was able to ambulate independently without LOB or safety concerns. Pt denies dizziness during exam, with only complaint being a vague feeling of dysequilibrium. Given pt's symptoms are stable and pt independent with mobility, would defer Epley at this time and recommend pt follow up with OP PT for vestibular eval. Discussed plan with pt who is agreeable to OP PT.

## 2022-04-11 NOTE — PROGRESS NOTES
ED OBSERVATION PROGRESS/DISCHARGE SUMMARY    Date of Admission: 4/10/2022   LOS: 0 days   PCP: Marylou Hughes APRN    Subjective  Patient states she is feeling well this morning.  She states she was able to ambulate to the bathroom overnight without issue.  Patient denies chest pain, shortness of breath, palpitations, nausea, vomiting, diarrhea.    Hospital Outcome: Patient was admitted to observation unit for vague neurological complaints.  She was seen by neurology who feel this may be due to vertigo as patient has history of BPPV.  MRI brain was negative for acute findings.      Patient was seen and evaluated by physical therapy this morning, no Epley maneuver indicated based on the improvement of her symptoms.  They recommend outpatient stability therapy.  Patient is agreement with plan.    ROS:  General: no fevers, chills  Respiratory: no cough, dyspnea  Cardiovascular: no chest pain, palpitations  Abdomen: No abdominal pain, nausea, vomiting, or diarrhea  Neurologic: No focal weakness, vision changes    Objective   Physical Exam:  I have reviewed the vital signs.  Temp:  [97.6 °F (36.4 °C)-98.9 °F (37.2 °C)] 97.8 °F (36.6 °C)  Heart Rate:  [59-91] 65  Resp:  [16] 16  BP: (121-157)/(72-86) 121/72  General Appearance:    Alert, cooperative, no distress  Head:    Normocephalic, atraumatic  Eyes:    Sclerae anicteric  Neck:   Supple, no mass  Lungs: Clear to auscultation bilaterally, respirations unlabored  Heart: Regular rate and rhythm, S1 and S2 normal, no murmur, rub or gallop  Abdomen:  Soft, non-tender, bowel sounds active, nondistended  Extremities: No clubbing, cyanosis, or edema to lower extremities  Pulses:  2+ and symmetric in distal lower extremities  Skin: No rashes   Neurologic: Oriented x3, Normal strength to extremities    Results Review:    I have reviewed the labs, radiology results and diagnostic studies.    Results from last 7 days   Lab Units 04/10/22  1631   WBC 10*3/mm3 5.58   HEMOGLOBIN  g/dL 13.5   HEMATOCRIT % 39.6   PLATELETS 10*3/mm3 230     Results from last 7 days   Lab Units 04/10/22  1631 04/10/22  1126   SODIUM mmol/L 138 140   POTASSIUM mmol/L 4.1 4.5   CHLORIDE mmol/L 103 105   CO2 mmol/L 26.0 27.0   BUN mg/dL 20 22   CREATININE mg/dL 0.78 0.74   CALCIUM mg/dL 9.2 9.2   BILIRUBIN mg/dL 0.4 0.4   ALK PHOS U/L 67 68   ALT (SGPT) U/L 15 17   AST (SGOT) U/L 21 21   GLUCOSE mg/dL 91 93     Imaging Results (Last 24 Hours)     Procedure Component Value Units Date/Time    MRI Brain With & Without Contrast [[id number]] Collected: 04/10/22 2006     Updated: 04/10/22 2020    Narrative:      MRI BRAIN W WO CONTRAST-     CLINICAL HISTORY: Ataxia. Left-sided tinnitus.     TECHNIQUE: Multiple axial T1, T2, gradient echo and diffusion images of  the entire brain were obtained. Axial and coronal thin section  T1-weighted images were obtained through the internal auditory canals  after injection of IV contrast. Postcontrast images of the entire brain  were also acquired.      COMPARISON: CTA of the head performed earlier today.     FINDINGS: The brain and ventricular system appear structurally normal.  There is no evidence of recent or old intracranial hemorrhage or  infarction. No foci of restricted diffusion are identified in the brain  or brainstem. There is no abnormal enhancement within the brain or  brainstem. No masses are identified. In particular, no masses are  evident within either internal auditory canal and the 7th and 8th nerve  complexes appear normal bilaterally. Normal flow related enhancement is  evident within the major vascular structures on the postcontrast T1 MR  angiography images. There are a few tiny nodular foci of T2  hyperintensity in white matter of both cerebral hemispheres that are  nonspecific but are likely of minimal small vessel chronic ischemic  change. The paranasal sinuses and the mastoid air cells and middle ear  cavities are well aerated.       Impression:       Evidence of minimal small vessel chronic ischemic change as  described. Otherwise unremarkable MRI of the brain with attention to the  internal auditory canals.     This report was finalized on 4/10/2022 8:17 PM by Dr. Pablo Headley M.D.       CT Angiogram Neck [[id number]] Collected: 04/10/22 1522     Updated: 04/10/22 1535    Narrative:      CT ANGIOGRAM HEAD-, CT ANGIOGRAM NECK-     INDICATIONS: Stroke     TECHNIQUE: Radiation dose reduction techniques were utilized, including  automated exposure control and exposure modulation based on body  size.Noncontrast head CT was performed, followed by enhanced CT  angiography of the head and neck. Three-dimensional reconstructions were  created, reviewed, and assessed according to NASCET criteria.     COMPARISON: Head CT from 04/10/2022 at 1159 hours     FINDINGS:     No acute intracranial hemorrhage, midline shift or mass effect. No acute  territorial infarct is identified.     Mild periventricular hypodensity suggests chronic small vessel ischemic  change in a patient this age.     Arterial calcifications at the base of the brain.     Ventricles, cisterns, cerebral sulci are unremarkable for patient age.     The visualized paranasal sinuses, orbits, mastoid air cells are  unremarkable.      The CT angiography images show no hemodynamically significant focal  stenosis, aneurysm, or dissection in the cervical carotid or vertebral  arteries, or in the arteries at the base of the brain. Scattered  calcifications are seen in the bilateral carotid and vertebral arteries  without high-grade stenosis (0-49% stenosis).     Dental hardware artifact limits assessment at the level of the teeth.     A subcentimeter enhancing nodule is apparent in the right thyroid lobe.  Thyroid calcifications are present. If further imaging evaluation of the  thyroid is indicated, ultrasound could be considered.     Minimal fibronodular changes are seen at the lung apices. A nodular  density of  the right upper lobe, partly included, about 4 mm on axial  image 1, is indeterminate, CT correlation could be obtained.             Impression:      1. No hemodynamically significant focal stenosis, aneurysm, or  dissection in the cervical carotid or vertebral arteries or in the  arteries at the base of the brain.     2. No acute intracranial hemorrhage or hydrocephalus. No enhancing  lesion in brain. If there is further clinical concern, MRI could be  considered for further evaluation.     This report was finalized on 4/10/2022 3:31 PM by Dr. Thor Olsen M.D.       CT Angiogram Head [[id number]] Collected: 04/10/22 1522     Updated: 04/10/22 1535    Narrative:      CT ANGIOGRAM HEAD-, CT ANGIOGRAM NECK-     INDICATIONS: Stroke     TECHNIQUE: Radiation dose reduction techniques were utilized, including  automated exposure control and exposure modulation based on body  size.Noncontrast head CT was performed, followed by enhanced CT  angiography of the head and neck. Three-dimensional reconstructions were  created, reviewed, and assessed according to NASCET criteria.     COMPARISON: Head CT from 04/10/2022 at 1159 hours     FINDINGS:     No acute intracranial hemorrhage, midline shift or mass effect. No acute  territorial infarct is identified.     Mild periventricular hypodensity suggests chronic small vessel ischemic  change in a patient this age.     Arterial calcifications at the base of the brain.     Ventricles, cisterns, cerebral sulci are unremarkable for patient age.     The visualized paranasal sinuses, orbits, mastoid air cells are  unremarkable.      The CT angiography images show no hemodynamically significant focal  stenosis, aneurysm, or dissection in the cervical carotid or vertebral  arteries, or in the arteries at the base of the brain. Scattered  calcifications are seen in the bilateral carotid and vertebral arteries  without high-grade stenosis (0-49% stenosis).     Dental hardware artifact  limits assessment at the level of the teeth.     A subcentimeter enhancing nodule is apparent in the right thyroid lobe.  Thyroid calcifications are present. If further imaging evaluation of the  thyroid is indicated, ultrasound could be considered.     Minimal fibronodular changes are seen at the lung apices. A nodular  density of the right upper lobe, partly included, about 4 mm on axial  image 1, is indeterminate, CT correlation could be obtained.             Impression:      1. No hemodynamically significant focal stenosis, aneurysm, or  dissection in the cervical carotid or vertebral arteries or in the  arteries at the base of the brain.     2. No acute intracranial hemorrhage or hydrocephalus. No enhancing  lesion in brain. If there is further clinical concern, MRI could be  considered for further evaluation.     This report was finalized on 4/10/2022 3:31 PM by Dr. Thor Olsen M.D.       CT Head Without Contrast [[id number]] Collected: 04/10/22 1245     Updated: 04/10/22 1257    Narrative:      CT HEAD WO CONTRAST-     INDICATIONS: Left arm weakness     TECHNIQUE: Radiation dose reduction techniques were utilized, including  automated exposure control and exposure modulation based on body size.  Noncontrast head CT     COMPARISON: None available     FINDINGS:     No acute intracranial hemorrhage, midline shift or mass effect. No acute  territorial infarct is identified.     Mild periventricular hypodensities suggest chronic small vessel ischemic  change in a patient this age.     Arterial calcifications are seen at the base of the brain.     Ventricles, cisterns, cerebral sulci are unremarkable for patient age.     The visualized paranasal sinuses, orbits, mastoid air cells are  unremarkable.          Impression:         No acute intracranial hemorrhage or hydrocephalus. If there is further  clinical concern, MRI could be considered for further evaluation.     This report was finalized on 4/10/2022  12:53 PM by Dr. Thor Olsen M.D.         echocardiogram 4/11/22  Interpretation Summary    · Left ventricle not well visualized.  · Left ventricular diastolic function was normal.  · Estimated left ventricular EF was in disagreement with the calculated left ventricular EF. Left ventricular ejection fraction appears to be 51 - 55%. Left ventricular systolic function is normal.  · Saline test for shunting was performed and was inconclusive for atrial level shunt.        I have reviewed the medications.  ---------------------------------------------------------------------------------------------  Assessment/Plan   Assessment/Problem List    TIA (transient ischemic attack)      Plan:    1.  Disequilibrium   -Patient has been seen and evaluated by neurology service, miriam for discharge home from their standpoint  -CTA head/neck negative  -MRI brain negative acute  -lipid panel shows elevated total cholesterol, would recommend fasting panel with PCP  -a1c 5.0  -echo limited for evaluation of left ventricle and shunting  -meclizine PRN dizziness  -PT for Epley  -Vestibular rehab referral at discharge, also f/u with ENT with which she is already established  -Incidental right pulmonary and right thyroid nodules noted on CT, patient educated regarding PCP follow-up    Disposition: Home    Follow-up after Discharge: PCP     This note will serve as a discharge summary    RACHEL Hughes 04/11/22 02:16 EDT

## 2022-04-11 NOTE — DISCHARGE INSTRUCTIONS
CT/CAT scans performed yesterday showed incidental right upper lobe of your lung lung nodule as well as right thyroid nodule and calcifications will need follow-up by your primary care provider.    Additionally your cholesterol/lipid panel came back slightly abnormal, we recommend having your family doctor repeat this when you are fasting

## 2022-04-11 NOTE — PROGRESS NOTES
"DOS: 2022  NAME: Arin Lyons   : 1955  PCP: Marylou Hughes APRN  Chief Complaint   Patient presents with   • Weakness - Generalized     Neurology     Subjective: No acute events overnight.  Patient denies any new complaints and or concerns on my exam.      No family at bedside    Objective:  Vital signs: /69 (BP Location: Left arm, Patient Position: Lying)   Pulse 78   Temp 98.06 °F (36.7 °C) (Oral)   Resp 16   Ht 174 cm (68.5\")   Wt 65.8 kg (145 lb)   SpO2 96%   BMI 21.73 kg/m²       HEENT: Normocephalic, atraumatic   COR: RRR  Resp: Even and unlabored  Extremities: Equal pulses, nondistal embolization    Neurological:   MS: AO. Language normal. No neglect. Higher integrative function normal  CN: II-XII normal  Motor: 5/5, normal tone  Reflexes: Toes downgoing  Sensory: Intact-to light touch  Coordination: Normal-finger-to-nose    Laboratory results:  Lab Results   Component Value Date    GLUCOSE 86 2022    CALCIUM 9.1 2022     2022    K 4.8 2022    CO2 25.1 2022     2022    BUN 16 2022    CREATININE 0.74 2022    EGFRIFAFRI 89 11/10/2021    EGFRIFNONA 77 11/10/2021    BCR 21.6 2022    ANIONGAP 8.9 2022     Lab Results   Component Value Date    WBC 4.36 2022    HGB 13.1 2022    HCT 39.1 2022    MCV 92.7 2022     2022     Lab Results   Component Value Date    CHOL 201 (H) 2022     Lab Results   Component Value Date    HDL 71 (H) 2022    HDL 79 11/10/2021    HDL 81 (H) 01/15/2021     Lab Results   Component Value Date     (H) 2022     (H) 11/10/2021     (H) 01/15/2021     Lab Results   Component Value Date    TRIG 60 2022    TRIG 56 11/10/2021    TRIG 104 01/15/2021         Lab 22  0454   HEMOGLOBIN A1C 5.00      Review and interpretation of imaging:  Interpretation Summary    · Left ventricle not well visualized.  · Left " ventricular diastolic function was normal.  · Estimated left ventricular EF was in disagreement with the calculated left ventricular EF. Left ventricular ejection fraction appears to be 51 - 55%. Left ventricular systolic function is normal.  · Saline test for shunting was performed and was inconclusive for atrial level shunt.     MRI BRAIN W WO CONTRAST-     CLINICAL HISTORY: Ataxia. Left-sided tinnitus.     TECHNIQUE: Multiple axial T1, T2, gradient echo and diffusion images of  the entire brain were obtained. Axial and coronal thin section  T1-weighted images were obtained through the internal auditory canals  after injection of IV contrast. Postcontrast images of the entire brain  were also acquired.      COMPARISON: CTA of the head performed earlier today.     FINDINGS: The brain and ventricular system appear structurally normal.  There is no evidence of recent or old intracranial hemorrhage or  infarction. No foci of restricted diffusion are identified in the brain  or brainstem. There is no abnormal enhancement within the brain or  brainstem. No masses are identified. In particular, no masses are  evident within either internal auditory canal and the 7th and 8th nerve  complexes appear normal bilaterally. Normal flow related enhancement is  evident within the major vascular structures on the postcontrast T1 MR  angiography images. There are a few tiny nodular foci of T2  hyperintensity in white matter of both cerebral hemispheres that are  nonspecific but are likely of minimal small vessel chronic ischemic  change. The paranasal sinuses and the mastoid air cells and middle ear  cavities are well aerated.     IMPRESSION:  Evidence of minimal small vessel chronic ischemic change as  described. Otherwise unremarkable MRI of the brain with attention to the  internal auditory canals.     This report was finalized on 4/10/2022 8:17 PM by Dr. Pablo Headley M.D.     CT ANGIOGRAM HEAD-, CT ANGIOGRAM NECK-      INDICATIONS: Stroke     TECHNIQUE: Radiation dose reduction techniques were utilized, including  automated exposure control and exposure modulation based on body  size.Noncontrast head CT was performed, followed by enhanced CT  angiography of the head and neck. Three-dimensional reconstructions were  created, reviewed, and assessed according to NASCET criteria.     COMPARISON: Head CT from 04/10/2022 at 1159 hours     FINDINGS:     No acute intracranial hemorrhage, midline shift or mass effect. No acute  territorial infarct is identified.     Mild periventricular hypodensity suggests chronic small vessel ischemic  change in a patient this age.     Arterial calcifications at the base of the brain.     Ventricles, cisterns, cerebral sulci are unremarkable for patient age.     The visualized paranasal sinuses, orbits, mastoid air cells are  unremarkable.      The CT angiography images show no hemodynamically significant focal  stenosis, aneurysm, or dissection in the cervical carotid or vertebral  arteries, or in the arteries at the base of the brain. Scattered  calcifications are seen in the bilateral carotid and vertebral arteries  without high-grade stenosis (0-49% stenosis).     Dental hardware artifact limits assessment at the level of the teeth.     A subcentimeter enhancing nodule is apparent in the right thyroid lobe.  Thyroid calcifications are present. If further imaging evaluation of the  thyroid is indicated, ultrasound could be considered.     Minimal fibronodular changes are seen at the lung apices. A nodular  density of the right upper lobe, partly included, about 4 mm on axial  image 1, is indeterminate, CT correlation could be obtained.           IMPRESSION:  1. No hemodynamically significant focal stenosis, aneurysm, or  dissection in the cervical carotid or vertebral arteries or in the  arteries at the base of the brain.     2. No acute intracranial hemorrhage or hydrocephalus. No enhancing  lesion  in brain. If there is further clinical concern, MRI could be  considered for further evaluation.     This report was finalized on 4/10/2022 3:31 PM by Dr. Thor Olsen M.D.  CT HEAD WO CONTRAST-     INDICATIONS: Left arm weakness     TECHNIQUE: Radiation dose reduction techniques were utilized, including  automated exposure control and exposure modulation based on body size.  Noncontrast head CT     COMPARISON: None available     FINDINGS:     No acute intracranial hemorrhage, midline shift or mass effect. No acute  territorial infarct is identified.     Mild periventricular hypodensities suggest chronic small vessel ischemic  change in a patient this age.     Arterial calcifications are seen at the base of the brain.     Ventricles, cisterns, cerebral sulci are unremarkable for patient age.     The visualized paranasal sinuses, orbits, mastoid air cells are  unremarkable.        IMPRESSION:     No acute intracranial hemorrhage or hydrocephalus. If there is further  clinical concern, MRI could be considered for further evaluation.     This report was finalized on 4/10/2022 12:53 PM by Dr. Thor Olsen M.D.    Impression: This is a 67-year-old female with no significant past medical history who presented to [facility] 4/10 due to complaint of ataxia for which our service was consulted.  Patient has known history of benign positional vertigo and has been followed by ENT in the past and self treats with Epley maneuver as well as use of meclizine for symptom management.  Initial CT/CTA showed no evidence of high-grade stenosis/aneurysm/dissection/occlusion/infarct.  Incidental finding of right thyroid nodule and right upper lung fibronodular changes; patient aware of lung fibronodular changes in the past-advised outpatient PCP follow-up.  MRI of the brain unremarkable.  Evidence of small vessel chronic ischemic changes noted.  No acute stroke seen.  TTE shows EF 51 to 55%.  LV normal.  LA  borderline dilated.  Saline test for shunting were inconclusive.  Left atrial volume index 27.4.    Neurologically, patient stable at neurologic baseline.  Recommendations below.No further neurology workup indicated. We will sign off and see again upon request.      Plan:  · Outpatient vestibular rehab  · Outpatient follow-up with ENT for audiogram  · Outpatient follow-up for incidental right thyroid nodule and right lung nodule.  · Meclizine 25-12.5 every 6 8 hours as needed for vertiginous symptom

## 2022-04-11 NOTE — OUTREACH NOTE
Prep Survey    Flowsheet Row Responses   [facility] patient discharged from? [place]   Is LACE score < 7 ? Yes   Emergency Room discharge w/ pulse ox? No   Eligibility [facility]   Date of Admission 04/10/22   Date of Discharge 04/11/22   Discharge Disposition Home or Self Care   Discharge diagnosis TIA (transient ischemic attack   Does the patient have one of the following disease processes/diagnoses(primary or secondary)? Stroke (TIA)   Does the patient have Home health ordered? No   Is there a DME ordered? No   Prep survey completed? Yes          HUMPHREY Duran Registered Nurse

## 2022-04-11 NOTE — THERAPY DISCHARGE NOTE
Patient Name: Arin Lyons  : 1955    MRN: [id number]                              Today's Date: 2022       Admit Date: 4/10/2022    Visit Dx:     ICD-10-CM ICD-9-CM   1. Dysequilibrium  R42 780.4   2. Numbness and tingling in left arm  R20.0 782.0    R20.2      Patient Active Problem List   Diagnosis   • Carotid stenosis, asymptomatic, bilateral   • Arthritis of foot   • Closed nondisplaced fracture of second metatarsal bone of right foot   • Closed nondisplaced fracture of third metatarsal bone of right foot   • Vitamin D deficiency   • Lumbar radiculopathy   • Osteoporosis without current pathological fracture   • Neck pain on left side   • Vertigo   • Degenerative disc disease, lumbar   • TIA (transient ischemic attack)     Past Medical History:   Diagnosis Date   • Arthritis ongoing   • Cancer (HCC)     skin cancer removed 32yrs ago   • Osteopenia 2021   • Scoliosis 2021     Past Surgical History:   Procedure Laterality Date   • COLONOSCOPY  2019   • SKIN CANCER EXCISION Right       General Information     Row Name 22 1139          Physical Therapy Time and Intention    Document Type discharge evaluation/summary  -EE     Mode of Treatment individual therapy;physical therapy  -EE     Row Name 22 1139          General Information    Prior Level of Function independent:;all household mobility;community mobility  -EE     Existing Precautions/Restrictions no known precautions/restrictions  -EE     Barriers to Rehab none identified  -EE     Row Name 22 1139          Living Environment    People in Home spouse  -EE     Row Name 22 1139          Cognition    Orientation Status (Cognition) oriented x 3  -EE     Row Name 22 1139          Safety Issues, Functional Mobility    Impairments Affecting Function (Mobility) balance  -EE           User Key  (r) = Recorded By, (t) = Taken By, (c) = Cosigned By    Initials Name Provider Type    EE Linda Beauchamp PT Physical  Therapist               Mobility     Row Name 04/11/22 1139          Bed Mobility    Bed Mobility supine-sit;sit-supine  -EE     Supine-Sit Sanders (Bed Mobility) independent  -EE     Sit-Supine Sanders (Bed Mobility) independent  -EE     Row Name 04/11/22 1139          Sit-Stand Transfer    Sit-Stand Sanders (Transfers) independent  -EE     Row Name 04/11/22 1139          Gait/Stairs (Locomotion)    Sanders Level (Gait) independent  -EE     Distance in Feet (Gait) 150'  -EE     Deviations/Abnormal Patterns (Gait) irma decreased  -EE           User Key  (r) = Recorded By, (t) = Taken By, (c) = Cosigned By    Initials Name Provider Type    EE Linda Beauchamp, PT Physical Therapist               Obj/Interventions     Row Name 04/11/22 1140          Range of Motion Comprehensive    General Range of Motion bilateral lower extremity ROM WFL  -EE     Row Name 04/11/22 1140          Strength Comprehensive (MMT)    General Manual Muscle Testing (MMT) Assessment no strength deficits identified  -EE     Row Name 04/11/22 1140          Balance    Balance Assessment sitting static balance;standing static balance;standing dynamic balance  -EE     Static Sitting Balance independent  -EE     Position, Sitting Balance unsupported;sitting edge of bed  -EE     Static Standing Balance independent  -EE     Dynamic Standing Balance independent  -EE     Position/Device Used, Standing Balance unsupported  -EE     Row Name 04/11/22 1140          Sensory Assessment (Somatosensory)    Sensory Assessment (Somatosensory) not tested  -EE           User Key  (r) = Recorded By, (t) = Taken By, (c) = Cosigned By    Initials Name Provider Type    EE Linda Beauchamp, PT Physical Therapist               Goals/Plan    No documentation.                Clinical Impression     Row Name 04/11/22 1140          Pain    Pretreatment Pain Rating 0/10 - no pain  -EE     Posttreatment Pain Rating 0/10 - no pain  -EE     Row Name 04/11/22 1140           Plan of Care Review    Plan of Care Reviewed With patient  -EE     Outcome Evaluation Pt is a 68 yo female who presented to ED with complaint of mild balance impairment for several weeks, intermittent L hand numbness, and ringing in L ear. Pt reports hx of BPPV in the past, and has seen an ENT for this. MRI negative for acute neurological process, suspected L carpal tunnel syndrome contributing to sensory impairment in L hand. Prior to admission, pt was living at home with spouse and independent with all mobility. Upon exam, demonstrates no balance impairment and was able to ambulate independently without LOB or safety concerns. Pt denies dizziness during exam, with only complaint being a vague feeling of dysequilibrium. Given pt's symptoms are stable and pt independent with mobility, would defer Epley at this time and recommend pt follow up with OP PT for vestibular eval. Discussed plan with pt who is agreeable to OP PT.  -EE     Row Name 04/11/22 6475          Therapy Assessment/Plan (PT)    Criteria for Skilled Interventions Met (PT) no problems identified which require skilled intervention  no further acute PT indicated, recommend OP PT for vestibular eval  -EE     Row Name 04/11/22 8962          Positioning and Restraints    Pre-Treatment Position in bed  -EE     Post Treatment Position bed  -EE     In Bed fowlers;call light within reach;encouraged to call for assist;notified nsg  -EE           User Key  (r) = Recorded By, (t) = Taken By, (c) = Cosigned By    Initials Name Provider Type    Linda Taveras, PT Physical Therapist               Outcome Measures     Row Name 04/11/22 9755          How much help from another person do you currently need...    Turning from your back to your side while in flat bed without using bedrails? 4  -EE     Moving from lying on back to sitting on the side of a flat bed without bedrails? 4  -EE     Moving to and from a bed to a chair (including a wheelchair)? 4  -EE      Standing up from a chair using your arms (e.g., wheelchair, bedside chair)? 4  -EE     Climbing 3-5 steps with a railing? 4  -EE     To walk in hospital room? 4  -EE     AM-PAC 6 Clicks Score (PT) 24  -EE     Row Name 04/11/22 1148          Functional Assessment    Outcome Measure Options AM-PAC 6 Clicks Basic Mobility (PT)  -           User Key  (r) = Recorded By, (t) = Taken By, (c) = Cosigned By    Initials Name Provider Type    EE Linda Beauchamp, PT Physical Therapist              Physical Therapy Education                 Title: PT OT SLP Therapies (Resolved)     Topic: Physical Therapy (Resolved)     Point: Mobility training (Resolved)     Learning Progress Summary           Patient Acceptance, E,TB,D,H, VU,DU by  at 4/11/2022 1149    Comment: Provided vestibular info sheet regarding OP PT                               User Key     Initials Effective Dates Name Provider Type Discipline     06/16/21 -  Linda Beauchamp PT Physical Therapist PT              PT Recommendation and Plan     Plan of Care Reviewed With: patient  Outcome Evaluation: Pt is a 66 yo female who presented to ED with complaint of mild balance impairment for several weeks, intermittent L hand numbness, and ringing in L ear. Pt reports hx of BPPV in the past, and has seen an ENT for this. MRI negative for acute neurological process, suspected L carpal tunnel syndrome contributing to sensory impairment in L hand. Prior to admission, pt was living at home with spouse and independent with all mobility. Upon exam, demonstrates no balance impairment and was able to ambulate independently without LOB or safety concerns. Pt denies dizziness during exam, with only complaint being a vague feeling of dysequilibrium. Given pt's symptoms are stable and pt independent with mobility, would defer Epley at this time and recommend pt follow up with OP PT for vestibular eval. Discussed plan with pt who is agreeable to OP PT.     Time Calculation:    PT  Charges     Row Name 04/11/22 1149             Time Calculation    Start Time 1113  -EE      Stop Time 1123  -EE      Time Calculation (min) 10 min  -EE      PT Received On 04/11/22  -EE            User Key  (r) = Recorded By, (t) = Taken By, (c) = Cosigned By    Initials Name Provider Type    EE Linda Beauchamp, ANGEL Physical Therapist              Therapy Charges for Today     Code Description Service Date Service Provider Modifiers Qty    [id number] HC PT EVAL LOW COMPLEXITY 1 4/11/2022 Linda Beauchamp, ANGEL GP 1          PT G-Codes  Outcome Measure Options: AM-PAC 6 Clicks Basic Mobility (PT)  AM-PAC 6 Clicks Score (PT): 24    PT Discharge Summary  Anticipated Discharge Disposition (PT): home, home with outpatient therapy services     Patient was intermittently wearing a face mask during this therapy encounter. Therapist used appropriate personal protective equipment including mask and gloves.  Mask used was standard procedure mask. Appropriate PPE was worn during the entire therapy session. Hand hygiene was completed before and after therapy session. Patient is not in enhanced droplet precautions.       Linda Beauchamp, ANGEL  4/11/2022

## 2022-04-12 ENCOUNTER — TRANSITIONAL CARE MANAGEMENT TELEPHONE ENCOUNTER (OUTPATIENT)
Dept: CALL CENTER | Facility: HOSPITAL | Age: 67
End: 2022-04-12

## 2022-04-12 NOTE — OUTREACH NOTE
Call Center TCM Note    Flowsheet Row Responses   [facility] patient discharged from? [place]   Does the patient have one of the following disease processes/diagnoses(primary or secondary)? Stroke (TIA)   TCM attempt successful? Yes   Discharge diagnosis TIA (transient ischemic attack   Meds reviewed with patient/caregiver? Yes   Is the patient having any side effects they believe may be caused by any medication additions or changes? No   Does the patient have all medications ordered at discharge? Yes   Prescription comments Pt did cancel rx at her pharm for the Meclizine, as pt has been on this before, and has current rx at home.    Is the patient taking all medications as directed (includes completed medication regime)? Yes   Does the patient have a primary care provider?  Yes   Does the patient have an appointment with their PCP within 7 days of discharge? No   Comments regarding PCP Pt declines to sched TCM APPT for now, she is sched for several Vestibular P.T. appts and wants to see how that goes first.    Has the patient kept scheduled appointments due by today? N/A   Has home health visited the patient within 72 hours of discharge? N/A   Does the patient require any assistance with activities of daily living such as eating, bathing, dressing, walking, etc.? No   Does the patient have any residual symptoms from stroke/TIA? Yes   Residual symptoms comments Pt continues with gait issues.    Does the patient understand the diet ordered at discharge? Yes   Did the patient receive a copy of their discharge instructions? Yes   Nursing interventions Reviewed instructions with patient   What is the patient's perception of their health status since discharge? Improving   Nursing interventions Nurse provided patient education   Is the patient able to teach back FAST for Stroke? Yes   Is the patient/caregiver able to teach back the risk factors for a stroke? High blood pressure-goal below 120/80, Carotid or  other artery disease, Illegal drug use, Smoking, History of TIAs, Sleep apnea, Diabetes, History of Afib, HIgh red blood cell count, High Cholesterol, Physical inactivity and obesity, Excessive alcohol intake   Is the patient/caregiver able to teach back signs and symptoms related to disease process for when to call PCP? Yes   Is the patient/caregiver able to teach back signs and symptoms related to disease process for when to call 911? Yes   If the patient is a current smoker, are they able to teach back resources for cessation? Not a smoker   Is the patient/caregiver able to teach back the hierarchy of who to call/visit for symptoms/problems? PCP, Specialist, Home health nurse, Urgent Care, ED, 911 Yes   TCM call completed? Yes   Wrap up additional comments Pt doing pretty well, balance issues a bit better. Pt did cancel rx at her pharm for the Meclizine, as pt has been on this before, and has current rx at home. Pt declines to sched TCM APPT for now, she is sched for several Vestibular P.T. appts and wants to see how that goes first.           Kaylie Moreno MA    4/12/2022, 14:47 EDT

## 2022-04-14 ENCOUNTER — HOSPITAL ENCOUNTER (OUTPATIENT)
Dept: PHYSICAL THERAPY | Facility: HOSPITAL | Age: 67
Setting detail: THERAPIES SERIES
Discharge: HOME | End: 2022-04-14

## 2022-04-14 DIAGNOSIS — R42 DYSEQUILIBRIUM: Primary | ICD-10-CM

## 2022-04-14 DIAGNOSIS — R26.81 UNSTEADY GAIT: ICD-10-CM

## 2022-04-14 PROCEDURE — 97162 PT EVAL MOD COMPLEX 30 MIN: CPT

## 2022-04-14 NOTE — THERAPY EVALUATION
.Outpatient Physical Therapy Neuro Initial Evaluation  [facility]     Patient Name: Arin yLons  : 1955  MRN: [id number]  Today's Date: 2022      Visit Date: 2022    Patient Active Problem List   Diagnosis   • Carotid stenosis, asymptomatic, bilateral   • Arthritis of foot   • Closed nondisplaced fracture of second metatarsal bone of right foot   • Closed nondisplaced fracture of third metatarsal bone of right foot   • Vitamin D deficiency   • Lumbar radiculopathy   • Osteoporosis without current pathological fracture   • Neck pain on left side   • Vertigo   • Degenerative disc disease, lumbar   • TIA (transient ischemic attack)        Past Medical History:   Diagnosis Date   • Arthritis ongoing   • Cancer (HCC)     skin cancer removed 32yrs ago   • Osteopenia 2021   • Scoliosis 2021        Past Surgical History:   Procedure Laterality Date   • COLONOSCOPY  2019   • SKIN CANCER EXCISION Right          Visit Dx:     ICD-10-CM ICD-9-CM   1. Dysequilibrium  R42 780.4   2. Unsteady gait  R26.81 781.2        Patient History     Row Name 22 0930 22 1336          History    Chief Complaint Balance Problems;Difficulty Walking;Dizziness  -YOU Balance Problems;Difficulty Walking;Dizziness (P)    -patient     Date Current Problem(s) Began 22  -YOU 22 (P)    -patient     Brief Description of Current Complaint Pt has history of vertigo for ~ 30 years and has seen ENT. Provided with HEP and Meclizine (take PRN) from ENT but never seen PT. Pt reports she has episodes when her symptoms worsen such as: April when allergies increase.  Pt reports gradual onset of dizziness on 3/27/22 with symptoms continuing and frustrating pt. She went to [facility] observation overnight on 4/10/22 to 22and work up for stroke was negative. Pt now referred for outpt PT for vestibular rehab. Per pt: I am still having balance, some dizziness, issues and can't seem to get back to some kind  of normal  -YOU I am still having balance, some dizziness, issues and can't seem to get back to some kind of normal (P)    -patient     Previous treatment for THIS PROBLEM Medication  -YOU --     Patient/Caregiver Goals Return to prior level of function;Improve mobility;Relief from dizziness;Know what to do to help the symptoms  -YOU Return to prior level of function;Improve mobility;Relief from dizziness;Know what to do to help the symptoms (P)    -patient     Hand Dominance right-handed  -YOU right-handed (P)    -patient     Occupation/sports/leisure activities I try to walk daily at least 3+ miles/day. Light weight workout and stretching  -YOU I try to walk daily at least 3+ miles/day. Light weight workout and stretching (P)    -patient     Patient seeing anyone else for problem(s)? no  -YOU no (P)    -patient     What clinical tests have you had for this problem? CT scan;MRI;Blood Work;Urinalysis  -YOU CT scan;MRI;Blood Work;Urinalysis (P)    -patient     Are you or can you be pregnant No  -YOU No (P)    -patient            Pain     Pain at Present 0  -YOU --            Fall Risk Assessment    Any falls in the past year: No  -YOU No (P)    -patient            Services    Prior Rehab/Home Health Experiences No  -YOU No (P)    -patient     Are you currently receiving Home Health services No  -YOU No (P)    -patient     Do you plan to receive Home Health services in the near future No  -YOU No (P)    -patient            Daily Activities    Primary Language English  -YOU English (P)    -patient     Are you able to read Yes  -YOU Yes (P)    -patient     Are you able to write Yes  -YOU Yes (P)    -patient     How does patient learn best? Reading;Demonstration  -YOU Reading;Demonstration (P)    -patient            Safety    Are you being hurt, hit, or frightened by anyone at home or in your life? No  -YOU No (P)    -patient     Are you being neglected by a caregiver No  -YOU No (P)    -patient     Have you had any of the following issues  with N/A  -YOU N/A (P)    -patient           User Key  (r) = Recorded By, (t) = Taken By, (c) = Cosigned By    Initials Name Provider Type    Sveta Figueroa PT Physical Therapist    patient Arin Lyons --                     PT Neuro     Row Name 04/14/22 0930             Transfers    Sit-Stand [place] (Transfers) independent  -YOU      Stand-Sit [place] (Transfers) independent  -YOU              Gait/Stairs (Locomotion)    [place] Level (Gait) independent;standby assist;contact guard  SBA/CG assist by end of session after CRT  -YOU      Assistive Device (Gait) --  no device  -YUO      Distance in Feet (Gait) 300', 70' x 2  -YOU      Deviations/Abnormal Patterns (Gait) bilateral deviations;gait speed decreased;stride length decreased  -YOU      Bilateral Gait Deviations decreased arm swing;forward flexed posture;heel strike decreased  holds hands in fist  -YOU      [place] Level (Stairs) independent  -YOU      Handrail Location (Stairs) right side (ascending);left side (descending)  -YOU      Number of Steps (Stairs) 4  -YOU      Ascending Technique (Stairs) step-over-step  -YOU      Descending Technique (Stairs) step-over-step  descends slowly  -YOU      Comment, (Gait/Stairs) see FGA  -YOU            User Key  (r) = Recorded By, (t) = Taken By, (c) = Cosigned By    Initials Name Provider Type    Sveta Figueroa PT Physical Therapist                   Vestibular Eval     Row Name 04/14/22 4161             Subjective Comments    Subjective Comments Pt c/o her head feeling full. Pt reports frustration that symptoms have continued for several weeks where most of time last less time.  -YOU              Pain Assessment    Pain Assessment No/denies pain  -YOU              Vestibular Objective    General Observation Pt is well nourished female who arrived ambulating independently. However, by end of session/after CRT, pt felt unsteady [facility]   -YOU      Fall History none  -YOU      Use of Assistive Devices no device  -YOU              Cognition    Orientation Level Oriented to place;Oriented to time;Oriented to situation;Oriented to person  -YOU              Symptom Behavior    Type of Dizziness Imbalance;Spinning  -YOU      Frequency of Dizziness Several Times a Day  -YOU      Duration of Dizziness Seconds  -YOU      Aggravating Factors Lying supine;Forward bending;Rolling to right;Rolling to left;Supine to sit;Sit to stand  see Dizziness Handicap Inventory  -YOU      Relieving Factors Rest;Slow movements;Head stationary  -YOU              Occulomotor Exam Fixation Present    Spontaneous Nystagmus Absent  no fixation present  -YOU      Smooth Pursuit Normal  no fixation present; c/o feeling off balance  -YOU      Saccades Undershoots;Overshoots  looking up/down; no fixation present  -YOU              VOR with Occulomotor Exam Fixation Absent     Head-Shaking Nystagmus Horizontal Absent  c/o feeling wobbly, unsure of herself if got up to walk  -YOU      Head-Shaking Nystagmus Vertical Absent  wobbly, symptoms > with horizontal vs vertical  -YOU              Positional Testing    Positional Testing Without infrared goggles  -YOU      Vertebrobasilar Artery Screen - Right Negative  -YOU      Vertebrobasilar Artery Screen - Left Negative  -YOU      [place]-Hallpike Right Upbeat Nystagmus  -YOU      Harini-Hallpike Right Onset Time  head off mat: latency ~ 5 -10 seconds, nystagmus lasting 40 seconds; to sit no symptoms  -YOU      Harini-Hallpike Left Upbeat Nystagmus  -YOU      Harini-Hallpike Left Onset Time  head off mat: latency ~ 5 seconds, nystagmus much stronger, pt closed eyes and lasted ~ 10 seconds.  -YOU              Sensation    Sensation Right Extremity Intact  pt c/o off/on numbness L UE -- pt mentioned to neurologist while in hospital couple days ago and MD felt it could be carpal tunnel  -YOU              Strength    Lumbar/Hip --  overall strength 4+ to 5/5  -YOU               "High-level Balance    High-level Balance Other see FGA  -            User Key  (r) = Recorded By, (t) = Taken By, (c) = Cosigned By    Initials Name Provider Type    Sveta Figueroa PT Physical Therapist                  Therapy Education  Education Details: Discussed PT POC. Post CRT instructions reviewed -- pt stated she already sleeps on 2 pillows to prevent any symptoms. Exercise to begin in 2 days of ambulate with head turns. Provided with written information for strategies controlling dizziness and unsteadiness on \"good and bad days\".  Given: HEP, Symptoms/condition management, Fall prevention and home safety  Program: New  How Provided: Verbal, Demonstration, Written  Provided to: Patient  Level of Understanding: Teach back education performed, Verbalized, Demonstrated     PT OP Goals     Row Name 04/14/22 0930          PT Short Term Goals    STG Date to Achieve 05/12/22  -     STG 1 Pt will be independent with HEP to improve balance and reduce vestibular symptoms.  -     STG 2 Pt will ambulate 30' x 2 with head turns and then head nods without LOB, good gait speed and armswing B.  -     STG 3 Pt to score 25/30 on the FGA to indicate improved balance and reduced risk for falls.  -     STG 4 Pt will step over object (shoebox) without having to stop prior to step over.  -            Long Term Goals    LTG Date to Achieve 06/02/22  -     LTG 1 Pt to score 27/30 on the FGA to indicate improved balance and reduced risk for falls.  -     LTG 2 Pt will have < or = 10 on the Dizziness Handicap Inventory to indicate improved perceived positional and activity tolerance.  -     LTG 3 Pt will ambulate with increased strength length, B armswing and feel comfortable turning head during 180' x 2.  -            Time Calculation    PT Goal Re-Cert Due Date 05/12/22  -           User Key  (r) = Recorded By, (t) = Taken By, (c) = Cosigned By    Initials Name Provider Type    Sveta Figueroa PT " Physical Therapist                 PT Assessment/Plan     Row Name 22 1326          PT Assessment    Functional Limitations Decreased safety during functional activities;Impaired gait;Limitation in home management;Performance in self-care ADL;Limitations in community activities  -YOU     Impairments Balance;Gait  dizziness  -YOU     Assessment Comments Pt is a 68 y/o female who has long history of vertigo that has been treated by ENT by medication and exercise. Pt reports episodes of dizziness and imbalance off and on over the years but this particular bout has lasted ~ 3 weeks and even hospitalization. Work up for stroke was negative. Pt scored 26 on the Dizzziness Handicap Inventory. She had positive Harini-Hallpike B but symptoms stronger on the L side. Therefore L CRT completed. Pt also scored 22/30 on the Functional Gait Assessment indicating balance issues and risk for falls. Pt demonstrates some apprehensiveness with ambulation as well with some gait deviations. Pt will benefit from outpt PT for balance and vestibular rehab.  -YOU     Please refer to paper survey for additional self-reported information Yes  -YOU     Rehab Potential Good  -YOU     Patient/caregiver participated in establishment of treatment plan and goals Yes  -YOU     Patient would benefit from skilled therapy intervention Yes  -YOU            PT Plan    PT Frequency 1x/week  -YOU     Predicted Duration of Therapy Intervention (PT) 6-8 weeks  -YOU     Planned CPT's? PT EVAL MOD COMPLELITY: 97270;PT THER PROC EA 15 MIN: 38503;PT THER ACT EA 15 MIN: 91457;PT NEUROMUSC RE-EDUCATION EA 15 MIN: 01976;PT CANALITH REPOSITIONIN  -YOU     Physical Therapy Interventions (Optional Details) balance training;gait training;home exercise program;neuromuscular re-education;patient/family education;vestibular training  -YOU           User Key  (r) = Recorded By, (t) = Taken By, (c) = Cosigned By    Initials Name Provider Type    Sveta Figueroa, PT  Physical Therapist                    OP Exercises     Row Name 04/14/22 0930             Subjective Comments    Subjective Comments Pt c/o her head feeling full. Pt reports frustration that symptoms have continued for several weeks where most of time last less time.  -YOU              Exercise 1    Exercise Name 1 L CRT  -YOU      Cueing 1 Verbal;Tactile  -YOU      Reps 1 1  -YOU      Additional Comments pt sat ~ 12 minutes post CRT, pt was escorted down to lobby and sat in chair, waiting for  to pick her up.  -YOU            User Key  (r) = Recorded By, (t) = Taken By, (c) = Cosigned By    Initials Name Provider Type    Sveta Figueroa, PT Physical Therapist                            Outcome Measure Options: Dizziness Handicap Inventory, FGA (Functional Gait Assessment) (Dizziness Handicap Inventory score of 26)  Functional Gait Assessment (FGA)  Gait Level Surface: Normal  Change in Gait Speed: Normal  Gait with Horizontal Head Turns: Mild Impairment  Gait with Vertical Head Turns: Mild Impairment  Gait and Pivot Turn: Normal  Step Over Obstacle: Moderate Impairment (stops before stepping over, has done that a long time)  Gait with Narrow Base of Support: Normal  Gait with Eyes Closed: Moderate Impairment (very slow gait, hands in fist and arms stationary)  Ambulating Backwards: Mild Impairment  Steps: Mild Impairment  FGA Total Score: 22  FGA Comments: no device    Time Calculation:   Start Time: 0930  Stop Time: 1017  Time Calculation (min): 47 min  Total Timed Code Minutes- PT: 47 minute(s)  Untimed Charges  PT Eval/Re-eval Minutes: 47  Total Minutes  Untimed Charges Total Minutes: 47   Total Minutes: 47   Therapy Charges for Today     Code Description Service Date Service Provider Modifiers Qty    [id number]  PT EVAL MOD COMPLEXITY 3 4/14/2022 Sveta Bonner, PT GP 1          PT G-Codes  Outcome Measure Options: Dizziness Handicap Inventory, FGA (Functional Gait Assessment) (Dizziness Handicap  Inventory score of 26)  FGA Total Score: 22     Patient was wearing a face mask during this therapy encounter. Therapist used appropriate personal protective equipment including mask and gloves.  Mask used was standard procedure mask. Appropriate PPE was worn during the entire therapy session. Hand hygiene was completed before and after therapy session. Patient is not in enhanced droplet precautions.         Sveta Bonner, PT  4/14/2022

## 2022-04-20 ENCOUNTER — READMISSION MANAGEMENT (OUTPATIENT)
Dept: CALL CENTER | Facility: HOSPITAL | Age: 67
End: 2022-04-20

## 2022-04-20 NOTE — OUTREACH NOTE
Stroke Week 2 Survey    Flowsheet Row Responses   [facility] facility patient discharged from? [facility]   Does the patient have one of the following disease processes/diagnoses(primary or secondary)? Stroke (TIA)   Week 2 attempt successful? No   Unsuccessful attempts Attempt 1          HUMPHREY Ramirez Nurse

## 2022-04-21 ENCOUNTER — HOSPITAL ENCOUNTER (OUTPATIENT)
Dept: PHYSICAL THERAPY | Facility: HOSPITAL | Age: 67
Setting detail: THERAPIES SERIES
Discharge: HOME | End: 2022-04-21

## 2022-04-21 DIAGNOSIS — R42 DYSEQUILIBRIUM: Primary | ICD-10-CM

## 2022-04-21 DIAGNOSIS — R26.81 UNSTEADY GAIT: ICD-10-CM

## 2022-04-21 PROCEDURE — 97112 NEUROMUSCULAR REEDUCATION: CPT

## 2022-04-21 NOTE — THERAPY TREATMENT NOTE
"    Outpatient Physical Therapy Neuro Treatment Note  [facility]     Patient Name: Arin Lyons  : 1955  MRN: [id number]  Today's Date: 2022      Visit Date: 2022    Visit Dx:    ICD-10-CM ICD-9-CM   1. Dysequilibrium  R42 780.4   2. Unsteady gait  R26.81 781.2       Patient Active Problem List   Diagnosis   • Carotid stenosis, asymptomatic, bilateral   • Arthritis of foot   • Closed nondisplaced fracture of second metatarsal bone of right foot   • Closed nondisplaced fracture of third metatarsal bone of right foot   • Vitamin D deficiency   • Lumbar radiculopathy   • Osteoporosis without current pathological fracture   • Neck pain on left side   • Vertigo   • Degenerative disc disease, lumbar   • TIA (transient ischemic attack)            PT Neuro     Row Name 22             Bed Mobility    Comment, (Bed Mobility) had pt lie supine on 1 pillow -- first time in ~ 20 years not lying on 2 pillows. No symptoms, i.e.spinning except pt stated \"it felt different\".  -YOU            User Key  (r) = Recorded By, (t) = Taken By, (c) = Cosigned By    Initials Name Provider Type    Sveta Figueroa, PT Physical Therapist                   Vestibular Eval     Row Name 22             Subjective Comments    Subjective Comments Pt reports overall symptoms have improved. She states she felt better by Thursday evening and well since then. Able to walk in a parade last week, walk by herself 1 day (first time in \"a while\") and played basketball with grandson yesterday. Pt admits she is afraid to bend over fully or lie flat on 1 pillow because of ~ 20 years of avoiding these movements. She said she cancelled her dentist appt yesterday because she didn't feel comfortable lying back in chair. Rescheduled for July. She said she woke up today feeling a little \"wobbly\" and ears stopped up.  -YOU              Pain Assessment    Pain Assessment --  did not mention pain  -YOU              Vestibular " "Objective    General Observation Pt arrived ambulating independently without assistive device.  -YOU              Cognition    Orientation Level Oriented to place;Oriented to time;Oriented to situation;Oriented to person  -YOU              Symptom Behavior    Type of Dizziness Imbalance  -YOU      Frequency of Dizziness --  none this week but \"afraid\" it might  -YOU      Duration of Dizziness Seconds  -YOU      Aggravating Factors Lying supine;Supine to sit  apprehensive about these movements but did not cause any symptoms  -YOU      Relieving Factors Rest;Slow movements;Head stationary  pt lies on 2 pillows, has been doing this for \"20 years\"  -YOU              Positional Testing    [place]-Hallpike Right No nystagmus  -YOU      Harini-Hallpike Right Onset Time  Head off mat: no symptoms but \"feels like it's going to come on\"; no symptoms to sit  -YOU      Harini-Hallpike Left No nystagmus  -YOU      [place]-Hallpike Left Onset Time  head off mat: no symptoms, \"feels like it's going to come on but isn't\"; no symptoms to sit  -YOU            User Key  (r) = Recorded By, (t) = Taken By, (c) = Cosigned By    Initials Name Provider Type    Sveta Figueroa, PT Physical Therapist                   PT Assessment/Plan     Row Name 04/21/22 1040          PT Assessment    Assessment Comments Pt reporting no spinning this week and B Harini-Hallpike were negative. Pt now has had so many years of avoiding moving her head or positions that her HEP is focusing on trying to get pt use to those movements again.  -YOU           User Key  (r) = Recorded By, (t) = Taken By, (c) = Cosigned By    Initials Name Provider Type    Sveta Figueroa, PT Physical Therapist                    OP Exercises     Row Name 04/21/22 1048 04/21/22 0413          Subjective Comments    Subjective Comments -- Pt reports overall symptoms have improved. She states she felt better by Thursday evening and well since then. Able to walk in a parade last week, walk by herself 1 " "day (first time in \"a while\") and played basketball with bijal yesterday. Pt admits she is afraid to bend over fully or lie flat on 1 pillow because of ~ 20 years of avoiding these movements. She said she cancelled her dentist appt yesterday because she didn't feel comfortable lying back in chair. Rescheduled for July. She said she woke up today feeling a little \"wobbly\" and ears stopped up.  -            Total Minutes    43649 -  PT Neuromuscular Reeducation Minutes 43  -YOU --            Exercise 2    Exercise Name 2 -- Roy/Daroff  -YOU     Cueing 2 -- Verbal;Tactile  -YOU     Reps 2 -- 1 x each way  -YOU     Additional Comments -- To L and then R sidelying -- \" feels like it's coming on but doesn't\"; no symptoms to sit  -YOU            Exercise 3    Exercise Name 3 -- Standing and moving ball in large [place] CW and CCW  -YOU     Cueing 3 -- Verbal;Tactile;Demo  cues to keep eyes on ball at all times - especially look down  -YOU     Reps 3 -- 5-6 x CW and CCW  -YOU     Additional Comments -- No increased symptoms just cues for pt to not avoid movement of looking down  -YOU            Exercise 4    Exercise Name 4 -- Stand and bend over to retrieve object from floor and place on mat behind her 1st over R shoulder, then over L shoulder  -YOU     Cueing 4 -- Verbal;Tactile;Demo  -YOU     Reps 4 -- 6 cones for each side  -YOU     Additional Comments -- not much symptoms just cues for pt to not avoid bending over and looking down.  -YOU            Exercise 5    Exercise Name 5 -- Ambulate with head turns and head nods 60' x 2 each  -YOU     Cueing 5 -- Verbal  -YOU     Additional Comments -- no LOB and armswing noted without hands clinched in fist.  -YOU            Exercise 6    Exercise Name 6 -- Sit and bend forward as if touch forehead toward R knee and sit up quickly; repeat to L knee  -YOU     Cueing 6 -- Verbal;Demo  -YOU     Reps 6 -- 2 x each side  -YOU     Additional Comments -- no symptoms  -YOU           User Key  (r) = " Recorded By, (t) = Taken By, (c) = Cosigned By    Initials Name Provider Type    YOU Sveta Bonner, PT Physical Therapist                                Therapy Education  Education Details: Provided with additional HEP: Roy/Daroff; stand and move ball in large [place] CW/CCW; bend over to retrieve object from floor. Encouraged pt to try to not go out of her way to avoid head positioning such as: bend over or try to lie supine wiht 1 pillow.  Given: HEP, Symptoms/condition management, Fall prevention and home safety  Program: Progressed  How Provided: Verbal, Demonstration, Written  Provided to: Patient  Level of Understanding: Teach back education performed, Verbalized, Demonstrated              Time Calculation:   Start Time: 0931  Stop Time: 1014  Time Calculation (min): 43 min  Total Timed Code Minutes- PT: 43 minute(s)  Timed Charges  63134 -  PT Neuromuscular Reeducation Minutes: 43  Total Minutes  Timed Charges Total Minutes: 43   Total Minutes: 43   Therapy Charges for Today     Code Description Service Date Service Provider Modifiers Qty    [id number] HC PT NEUROMUSC RE EDUCATION EA 15 MIN 4/21/2022 Sveta Bonner, PT GP 3              Patient was wearing a face mask during this therapy encounter. Therapist used appropriate personal protective equipment including mask and gloves.  Mask used was standard procedure mask. Appropriate PPE was worn during the entire therapy session. Hand hygiene was completed before and after therapy session. Patient is not in enhanced droplet precautions.           Sveta Bonner, PT  4/21/2022

## 2022-04-28 ENCOUNTER — HOSPITAL ENCOUNTER (OUTPATIENT)
Dept: PHYSICAL THERAPY | Facility: HOSPITAL | Age: 67
Setting detail: THERAPIES SERIES
Discharge: HOME | End: 2022-04-28

## 2022-04-28 DIAGNOSIS — R26.81 UNSTEADY GAIT: ICD-10-CM

## 2022-04-28 DIAGNOSIS — R42 DYSEQUILIBRIUM: Primary | ICD-10-CM

## 2022-04-28 PROCEDURE — 97112 NEUROMUSCULAR REEDUCATION: CPT

## 2022-04-28 NOTE — THERAPY TREATMENT NOTE
"    Outpatient Physical Therapy Neuro Treatment Note  [facility]     Patient Name: Arin Lyons  : 1955  MRN: [id number]  Today's Date: 2022      Visit Date: 2022    Visit Dx:    ICD-10-CM ICD-9-CM   1. Dysequilibrium  R42 780.4   2. Unsteady gait  R26.81 781.2       Patient Active Problem List   Diagnosis   • Carotid stenosis, asymptomatic, bilateral   • Arthritis of foot   • Closed nondisplaced fracture of second metatarsal bone of right foot   • Closed nondisplaced fracture of third metatarsal bone of right foot   • Vitamin D deficiency   • Lumbar radiculopathy   • Osteoporosis without current pathological fracture   • Neck pain on left side   • Vertigo   • Degenerative disc disease, lumbar   • TIA (transient ischemic attack)                  Vestibular Eval     Row Name 22 0730             Subjective Comments    Subjective Comments Pt reports she has been doing well over past week. Denies any spinning. However, she woke up this morning feeling \"not clear\", increased fullness in her ears today. Pt notes she did alot of yard work yesterday. She uses some nasal spray every morning as recommended by her ENT. Pt reports she has always been sensitive to movement. \"I even can hardly wear sunglasses.\"  -YOU              Pain Assessment    Pain Assessment --  did not mention pain  -YOU              Vestibular Objective    General Observation Pt arrived ambulating independently without assistive device. Drove herself  -YOU      Fall History none  -YOU              Cognition    Orientation Level Oriented to place;Oriented to time;Oriented to situation;Oriented to person  -YOU              Symptom Behavior    Type of Dizziness Funny feeling in head  \"not clear feeling\"  -YOU      Frequency of Dizziness --  these symptoms are from this morning only  -YOU      Aggravating Factors Activity in general  this morning only  -YOU      Relieving Factors Slow movements;Head stationary  although after PT session " "today, pt reported she felt better than when she arrived.  -YOU              Occulomotor Exam Fixation Present    Smooth Pursuit Normal  no fixation present; c/o focus not clear  -YOU      Saccades --  no fixation present; pt c/o focus not clear  -YOU              VOR with Occulomotor Exam Fixation Absent     Head-Shaking Nystagmus Horizontal Absent  no symptoms  -YOU      Head-Shaking Nystagmus Vertical Absent  no symptoms  -YOU              Positional Testing    Positional Testing Without infrared goggles  -YOU      [place]-Hallpike Right No nystagmus  -YOU      [place]-Hallpike Right Onset Time  Head off mat: no symptoms but \"feels like it's going to come on\"; to sit -- a little lightheaded. Quickly dissipated.  -YOU      [place]-Hallpike Left No nystagmus  -YOU      [place]-Hallpike Left Onset Time  no symptoms head off mat or to sit  -YOU            User Key  (r) = Recorded By, (t) = Taken By, (c) = Cosigned By    Initials Name Provider Type    Sveta Figueroa, PT Physical Therapist                   PT Assessment/Plan     Row Name 04/28/22 1043          PT Assessment    Assessment Comments Pt overall continues to improve, having had \"a good week\" in which pt has done alot of activities, yard work, and walking. She did wake up today feeling fullness in ears and \"not clear\" headed. Possibly related to being outdoors yesterday, aggravating some allergies, maybe?? Pt did have some symptoms with VOR, i.e. eye movement so added those exercises to her HEP. Pt doing well and recommend seeing pt in 2 weeks.  -YOU            PT Plan    PT Plan Comments next session on 5/12/22.  -YOU           User Key  (r) = Recorded By, (t) = Taken By, (c) = Cosigned By    Initials Name Provider Type    Sveta Figueroa, PT Physical Therapist                    OP Exercises     Row Name 04/28/22 1047 04/28/22 0959          Subjective Comments    Subjective Comments -- Pt reports she has been doing well over past week. Denies any spinning. However, she " "woke up this morning feeling \"not clear\", increased fullness in her ears today. Pt notes she did alot of yard work yesterday. She uses some nasal spray every morning as recommended by her ENT. Pt reports she has always been sensitive to movement. \"I even can hardly wear sunglasses.\"  -YOU            Total Minutes    94435 -  PT Neuromuscular Reeducation Minutes 45  -YOU --            Exercise 3    Exercise Name 3 -- Standing and moving ball in large [place] CW and CCW  -YOU     Cueing 3 -- Verbal;Tactile;Demo  -YOU     Reps 3 -- 5-6 x CW and CCW  -YOU     Additional Comments -- No increased symptoms just cues for pt to not avoid movement of looking down  -YOU            Exercise 5    Exercise Name 5 -- Ambulate with head turns and head nods 30' x 2 each  -YOU     Cueing 5 -- Verbal  -YOU     Additional Comments -- no LOB  -YOU            Exercise 7    Exercise Name 7 -- Body turns along wall  -YOU     Cueing 7 -- Verbal;Demo  -YOU     Sets 7 -- 2  -YOU     Reps 7 -- 2 turns to each direction  -YOU     Additional Comments -- felt like still turning briefly after stopping turn.  -YOU           User Key  (r) = Recorded By, (t) = Taken By, (c) = Cosigned By    Initials Name Provider Type    Sveta Figueroa, PT Physical Therapist                                Therapy Education  Education Details: Provided additional HEP of visual tracking, saccades and x1 gaze stabilization. Again encouraged pt to try lying supine on only 1 pillow. Discussed strategies on paper provided 2 weeks ago for \"good and bad days\". Also encouraged pt to reach out to her ENT or pharmacist to see if she can take an allergy pill along with her nasal decongestant spray. Pt had not been doing that because wasn't sure she could do both together.  Given: HEP, Symptoms/condition management  Program: Progressed  How Provided: Verbal, Demonstration, Written  Provided to: Patient  Level of Understanding: Teach back education performed, Verbalized, Demonstrated     "          Time Calculation:   Start Time: 0930  Stop Time: 1015  Time Calculation (min): 45 min  Total Timed Code Minutes- PT: 45 minute(s)  Timed Charges  90367 -  PT Neuromuscular Reeducation Minutes: 45  Total Minutes  Timed Charges Total Minutes: 45   Total Minutes: 45   Therapy Charges for Today     Code Description Service Date Service Provider Modifiers Qty    55304995286 HC PT NEUROMUSC RE EDUCATION EA 15 MIN 4/28/2022 Sveta Bonner, PT GP 3                Patient was wearing a face mask during this therapy encounter. Therapist used appropriate personal protective equipment including mask and gloves.  Mask used was standard procedure mask. Appropriate PPE was worn during the entire therapy session. Hand hygiene was completed before and after therapy session. Patient is not in enhanced droplet precautions.         Sveta Bonner, PT  4/28/2022

## 2022-05-05 ENCOUNTER — APPOINTMENT (OUTPATIENT)
Dept: PHYSICAL THERAPY | Facility: HOSPITAL | Age: 67
End: 2022-05-05

## 2022-05-11 ENCOUNTER — OFFICE VISIT (OUTPATIENT)
Dept: INTERNAL MEDICINE | Facility: CLINIC | Age: 67
End: 2022-05-11

## 2022-05-11 VITALS
BODY MASS INDEX: 21.77 KG/M2 | HEART RATE: 66 BPM | OXYGEN SATURATION: 100 % | SYSTOLIC BLOOD PRESSURE: 100 MMHG | HEIGHT: 69 IN | DIASTOLIC BLOOD PRESSURE: 70 MMHG | WEIGHT: 147 LBS | TEMPERATURE: 98.2 F

## 2022-05-11 DIAGNOSIS — E04.1 THYROID NODULE: ICD-10-CM

## 2022-05-11 DIAGNOSIS — J30.9 ALLERGIC SINUSITIS: Primary | ICD-10-CM

## 2022-05-11 DIAGNOSIS — R42 DYSEQUILIBRIUM: ICD-10-CM

## 2022-05-11 PROCEDURE — 99214 OFFICE O/P EST MOD 30 MIN: CPT | Performed by: NURSE PRACTITIONER

## 2022-05-11 RX ORDER — GUAIFENESIN 600 MG/1
600 TABLET, EXTENDED RELEASE ORAL EVERY 12 HOURS SCHEDULED
Qty: 14 TABLET
Start: 2022-05-11 | End: 2022-05-18

## 2022-05-11 RX ORDER — LORATADINE 10 MG/1
10 TABLET ORAL DAILY
Qty: 10 TABLET
Start: 2022-05-11 | End: 2022-09-23

## 2022-05-11 NOTE — PROGRESS NOTES
"Chief Complaint  Ataxia and Hand numbness    Subjective          Arin Lyons presents to [facility] for f/u regarding recent hospitalization for ataxia and left hand numbness.    She presented to the ER on April 10 due to feeling off balance with difficulty walking straight.  She states the symptoms had been intermittent for several months but persisted for several days prior to ER visit she does have a history of benign positional vertigo which has been treated by the Epley maneuver in the past.  Her MRI of the brain did not show any acute abnormalities and CVA was ruled out.  Her CTA of the head and neck did not show any acute abnormalities other than a nodule in the right thyroid.  There was also a nodule in the right upper lobe of the lung.  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  She does complain of sinus pressure and drainage with pressure in her ears.      Objective   Vital Signs:  /70 (BP Location: Left arm, Patient Position: Sitting, Cuff Size: Adult)   Pulse 66   Temp 98.2 °F (36.8 °C) (Temporal)   Ht 174 cm (68.5\")   Wt 66.7 kg (147 lb)   SpO2 100%   BMI 22.03 kg/m²     BMI is within normal parameters. No follow-up required.      Physical Exam  Constitutional:       Appearance: She is well-developed. She is not ill-appearing.   HENT:      Head: Normocephalic.      Right Ear: Hearing, tympanic membrane and external ear normal.      Left Ear: Hearing, tympanic membrane and external ear normal.      Nose: Nose normal. No nasal deformity, mucosal edema or rhinorrhea.      Right Sinus: No maxillary sinus tenderness or frontal sinus tenderness.      Left Sinus: No maxillary sinus tenderness or frontal sinus tenderness.      Mouth/Throat:      Dentition: Normal dentition.   Eyes:      General: Lids are normal.         Right eye: No discharge.         Left eye: No discharge.      Conjunctiva/sclera: " Conjunctivae normal.      Right eye: No exudate.     Left eye: No exudate.  Neck:      Thyroid: No thyroid mass or thyromegaly.      Vascular: No carotid bruit.      Trachea: Trachea normal.   Cardiovascular:      Rate and Rhythm: Regular rhythm.      Pulses: Normal pulses.      Heart sounds: Normal heart sounds. No murmur heard.  Pulmonary:      Effort: No respiratory distress.      Breath sounds: Normal breath sounds. No decreased breath sounds, wheezing, rhonchi or rales.   Abdominal:      General: Bowel sounds are normal.      Palpations: Abdomen is soft.      Tenderness: There is no abdominal tenderness.   Musculoskeletal:      Cervical back: Normal range of motion. No edema.   Lymphadenopathy:      Head:      Right side of head: No submental, submandibular, tonsillar, preauricular, posterior auricular or occipital adenopathy.      Left side of head: No submental, submandibular, tonsillar, preauricular, posterior auricular or occipital adenopathy.   Skin:     General: Skin is warm and dry.      Nails: There is no clubbing.   Neurological:      Mental Status: She is alert.      Sensory: Sensation is intact.      Motor: Motor function is intact.   Psychiatric:         Behavior: Behavior is cooperative.        Result Review :   The following data was reviewed by: NARCISA Israel on 05/11/2022:  Common labs    Common Labsle 11/10/21 11/10/21 11/10/21 4/10/22 4/10/22 4/10/22 4/10/22 4/11/22 4/11/22 4/11/22 4/11/22    1125 1125 1125 1126 1126 1631 1631 0454 0454 0454 0454   Glucose  82   93  91    86   BUN  16   22  20    16   Creatinine  0.79   0.74  0.78    0.74   eGFR Non African Am  77            eGFR  Am  89            Sodium  140   140  138    139   Potassium  5.1   4.5  4.1    4.8   Chloride  103   105  103    105   Calcium  9.5   9.2  9.2    9.1   Total Protein  7.1            Albumin  4.6   3.80  4.30    3.60   Total Bilirubin  0.6   0.4  0.4    0.6   Alkaline Phosphatase  76   68  67    65    AST (SGOT)  25   21  21    25   ALT (SGPT)  15   17  15    14   WBC 5.7   4.88  5.58  4.36      Hemoglobin 13.7   12.7  13.5  13.1      Hematocrit 41.0   39.5  39.6  39.1      Platelets 300   254  230  210      Total Cholesterol          201 (A)    Total Cholesterol   216 (A)           Triglycerides   56       60    HDL Cholesterol   79       71 (A)    LDL Cholesterol    127 (A)       119 (A)    Hemoglobin A1C         5.00     (A) Abnormal value       Comments are available for some flowsheets but are not being displayed.           Data reviewed: Radiologic studies 4/10/22 and Recent hospitalization notes MRI brain, CTA head and neck          Assessment and Plan    Diagnoses and all orders for this visit:    1. Allergic sinusitis (Primary)  Assessment & Plan:  She complains of sinus drainage and pressure with intermittent episodes of feeling lightheaded.  She will begin Claritin and Mucinex for further treatment of symptoms.  Consider further evaluation if symptoms do not improve.    Orders:  -     loratadine (CLARITIN) 10 MG tablet; Take 1 tablet by mouth Daily for 10 days.  Dispense: 10 tablet  -     guaiFENesin (Mucinex) 600 MG 12 hr tablet; Take 1 tablet by mouth Every 12 (Twelve) Hours for 7 days.  Dispense: 14 tablet    2. Thyroid nodule  Assessment & Plan:  Right thyroid nodule noted on MRI of the brain, will order thyroid ultrasound to further evaluate.    Orders:  -     US Thyroid; Future    3. Dysequilibrium  Assessment & Plan:  She was recently admitted for symptoms which are intermittent and steadily improving.  Neuro consult suggested PT with Epley maneuver.  No acute abnormalities noted on MRI of brain as well as CTA of the head and neck.      Current outpatient and discharge medications have been reconciled for the patient.  Reviewed by: NARCISA Lund       Follow Up   Return in about 4 weeks (around 6/8/2022).  Patient was given instructions and counseling regarding her condition or for  health maintenance advice. Please see specific information pulled into the AVS if appropriate.       Answers for HPI/ROS submitted by the patient on 5/6/2022  Please describe your symptoms.: Followup from ER visit with stroke like symptons  Have you had these symptoms before?: Yes  How long have you been having these symptoms?: Greater than 2 weeks  What is the primary reason for your visit?: Other

## 2022-05-12 ENCOUNTER — HOSPITAL ENCOUNTER (OUTPATIENT)
Dept: PHYSICAL THERAPY | Facility: HOSPITAL | Age: 67
Setting detail: THERAPIES SERIES
Discharge: HOME | End: 2022-05-12

## 2022-05-12 ENCOUNTER — HOSPITAL ENCOUNTER (OUTPATIENT)
Dept: ULTRASOUND IMAGING | Facility: HOSPITAL | Age: 67
Discharge: HOME | End: 2022-05-12
Admitting: NURSE PRACTITIONER

## 2022-05-12 DIAGNOSIS — R42 DYSEQUILIBRIUM: Primary | ICD-10-CM

## 2022-05-12 DIAGNOSIS — R26.81 UNSTEADY GAIT: ICD-10-CM

## 2022-05-12 DIAGNOSIS — E04.1 THYROID NODULE: ICD-10-CM

## 2022-05-12 PROCEDURE — 97112 NEUROMUSCULAR REEDUCATION: CPT

## 2022-05-12 PROCEDURE — 76536 US EXAM OF HEAD AND NECK: CPT

## 2022-05-12 NOTE — THERAPY DISCHARGE NOTE
"     Outpatient Physical Therapy Vestibular Treatment Note/Discharge Summary  [facility]     Patient Name: Arin Lyons  : 1955  MRN: [id number]  Today's Date: 2022      Visit Date: 2022    Visit Dx:     ICD-10-CM ICD-9-CM   1. Dysequilibrium  R42 780.4   2. Unsteady gait  R26.81 781.2       Patient Active Problem List   Diagnosis   • Carotid stenosis, asymptomatic, bilateral   • Arthritis of foot   • Closed nondisplaced fracture of second metatarsal bone of right foot   • Closed nondisplaced fracture of third metatarsal bone of right foot   • Vitamin D deficiency   • Lumbar radiculopathy   • Osteoporosis without current pathological fracture   • Neck pain on left side   • Vertigo   • Degenerative disc disease, lumbar   • TIA (transient ischemic attack)         Vestibular Eval     Row Name 22 1100 22 0925          Subjective Comments    Subjective Comments -- Pt reports she has been able to go on walks, yardwork, mow grass. Reports at times feeling a little \"off kilter\" but no dizziness. She is taking Clariton now with her decongestant nasal spray plus yesterday APRN told her to take some Mucinex all to help with her \"post nasal drip\" per pt. Pt said she is lying at times with only 1 pillow. Pt denies any concerns about d/c PT today.  -YOU            Pain Assessment    Pain Assessment No/denies pain  -YOU --            Vestibular Objective    General Observation Pt arrived ambulating independently without assistive device. Drove herself  -YOU --     Fall History none  -YOU --     Use of Assistive Devices no device  -YOU --            Cognition    Orientation Level Oriented to place;Oriented to time;Oriented to situation;Oriented to person  -YOU --            Symptom Behavior    Type of Dizziness Not Applicable  feels \"off kilter\" sometimes  -YOU --     Relieving Factors --  takes nasal decongestant  -YOU --            Positional Testing    Positional Testing Without infrared goggles  -YOU -- " "    [place]-Hallpike Right No nystagmus  -YOU --     [place]-Hallpike Right Onset Time  Head off mat and to sit -- no symptoms  -YOU --     [place]-Hallpike Left No nystagmus  -YOU --     [place]-Hallpike Left Onset Time  Head off mat: felt like symptoms were coming on but did not;  to sit-- no symptoms  -YOU --           User Key  (r) = Recorded By, (t) = Taken By, (c) = Cosigned By    Initials Name Provider Type    Sveta Figueroa, PT Physical Therapist                   PT Neuro     Row Name 05/12/22 0916             Transfers    Sit-Stand [place] (Transfers) independent  -YOU      Stand-Sit [place] (Transfers) independent  -YOU              Gait/Stairs (Locomotion)    [place] Level (Gait) independent  -YOU      Assistive Device (Gait) --  none  -YOU      Distance in Feet (Gait) 300' outdoors on grass, incline/decline, sidewalk  -YOU      Deviations/Abnormal Patterns (Gait) --  no deviations  -YOU      [place] Level (Stairs) independent  -YOU      Handrail Location (Stairs) right side (ascending);left side (descending)  -YOU      Number of Steps (Stairs) 4  -YOU      Ascending Technique (Stairs) step-over-step  -YOU      Descending Technique (Stairs) step-over-step  -YOU      Comment, (Gait/Stairs) see FGA  -YOU            User Key  (r) = Recorded By, (t) = Taken By, (c) = Cosigned By    Initials Name Provider Type    Sveta Figueroa, PT Physical Therapist                       PT Assessment/Plan     Row Name 05/12/22 1212          PT Assessment    Assessment Comments Pt reports no symptoms of spinning now but occasional feel \"off kilter\". She scored 4 on the Dizziness Handicap Inventory today. Pt c/o \"post nasal drip\". She has improved balance and ambulation with score today on the FGA was 28/30. Pt met all PT goals and is ready to be d/c from PT.  -YOU           User Key  (r) = Recorded By, (t) = Taken By, (c) = Cosigned By    Initials Name Provider Type    Sveta Figueroa, PT Physical Therapist             " "         OP Exercises     Row Name 05/12/22 1215 05/12/22 0925          Subjective Comments    Subjective Comments -- Pt reports she has been able to go on walks, yardwork, mow grass. Reports at times feeling a little \"off kilter\" but no dizziness. She is taking Clariton now with her decongestant nasal spray plus yesterday APRN told her to take some Mucinex all to help with her \"post nasal drip\" per pt. Pt said she is lying at times with only 1 pillow. Pt denies any concerns about d/c PT today.  -YOU            Total Minutes    50706 -  PT Neuromuscular Reeducation Minutes 40  -YOU --           User Key  (r) = Recorded By, (t) = Taken By, (c) = Cosigned By    Initials Name Provider Type    Sveta Figueroa, PT Physical Therapist                               PT OP Goals     Row Name 05/12/22 1200          PT Short Term Goals    STG 1 Pt will be independent with HEP to improve balance and reduce vestibular symptoms.  -YOU     STG 1 Progress Met  -YOU     STG 2 Pt will ambulate 30' x 2 with head turns and then head nods without LOB, good gait speed and armswing B.  -YOU     STG 2 Progress Met  -YOU     STG 3 Pt to score 25/30 on the FGA to indicate improved balance and reduced risk for falls.  -YOU     STG 3 Progress Met  -YOU     STG 3 Progress Comments 28/30 on retest today  -YOU     STG 4 Pt will step over object (shoebox) without having to stop prior to step over.  -YOU     STG 4 Progress Met  -YOU            Long Term Goals    LTG 1 Pt to score 27/30 on the FGA to indicate improved balance and reduced risk for falls.  -YOU     LTG 1 Progress Met  -YOU     LTG 2 Pt will have < or = 10 on the Dizziness Handicap Inventory to indicate improved perceived positional and activity tolerance.  -YOU     LTG 2 Progress Met  -YOU     LTG 2 Progress Comments score today was 4  -YOU     LTG 3 Pt will ambulate with increased strength length, B armswing and feel comfortable turning head during 180' x 2.  -YOU     LTG 3 Progress Met  -YOU     " ASSESSMENT: 82 Y/O woman with vascular risk factors of age and DM II is evaluated at [facility] for acute onset of right sided weakness. She was last normal at around 8:45 PM on day of admission, when she was walking in the shopping mall with her daughter. She suddenly developed acute onset of dizziness, imbalance and slurring of speech. She also reports to have noticed right hand weakness/numbess at the same time. She presented to [facility], where CT brain did not show any evidence of acute infarct or hemorrhage. She was treated with IV tPA. At around 12:00-12:30 AM, she was noted to have acute onset of right sided weakness involving face, arm and leg with severe dysarthria. CT brain following right hemiplegia did not show any evidence of acute infarct or hemorrhage. 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 She was subsequently transferred to [facility]  for further management. She underwent conventional angiogram with intentions for mechanical thrombectomy, which was not possible secondary to technical difficulties and she was subsequently treated with IA tPA with the partial recanalization of the previously noted, basilar occlusion. CT brain performed subsequently on 5/19/18 did not show any evidence of acute infarct or hemorrhage (see above).     Impression:  Cerebral thrombosis with cerebral infarction. Probable left pontine stroke - likely etiology being large vessel disease i.e. symptomatic intracranial (basilar artery) atherosclerosis and superimposed local thrombosis, leading to occlusion of pontine perforators     NEURO: Neurologically patient  continues to improve and is much returned to her neurological baseline. Continue close monitoring for neurologic deterioration. Progress to gradual normotension in setting of recent recanalization. Continue atorvastatin 80 mg at bedtime considering atheroembolic etiology of her stroke; further dose titration as per LDL goal less than 70. Recommend  MRI Brain w/o, MRA Head w/o with NOVA and Neck w/contrast once medically stable. Once imaging has been completed, will then determine likelihood of antiplatelet versus anticoagulation therapy. Will continue Heparin infusion at this time with PTT goal 40-60. The potential for oral anticoagulation need as outpatient has been discussed with patient and patient's family, all questions answered. Physical therapy/OT/Speech eval/treatment as patient tolerates Patient is deemed neurologically stable to transfer to stroke neurology floor, discussed with NSCU team.      ANTITHROMBOTIC THERAPY:  Cautious therapeutic anticoagulation with heparin drip, dose titration to achieve PTT goal 40-60; in the setting of an unstable atherosclerotic plaque involving the basilar artery. Subsequently, may consider Coumadin for 3 months and aspirin indefinitely [facility]) versus Plavix for 3 months and aspirin indefinitely ([facility]). Final determination pending further imaging - MRA of Head and NEck..     PULMONARY:  Protecting airway, saturating well.     CARDIOVASCULAR:  TTE: ef 70%, mild MR, minimal AR, mild TR  continue with telemetry to screen for possible cardiac source of embolism; Telemetry monitoring to screen for arrythmia                             BP goal  normotension; avoid hypotension    GASTROINTESTINAL: dysphagia screen passed       Diet: Regular, consistent carb    RENAL: BUN/Cr without acute change 7/0.64 (5/21/18), good urine output      Na Goal: Greater than 135     [place]:  n    HEMATOLOGY: H/H without acute change, Platelets 174 (5/20/18)     DVT ppx: Cautious therapeutic integration with heparin drip     ID: afebrile overnight. No leukocytosis (5/20/18).    Other: MRI/A head and neck ordered result pending.     DISPOSITION: Rehab or home depending on PT eval once stable and workup is complete.     CORE MEASURES:        Admission NIHSS: 12     TPA: [x] YES [] NO      LDL/HDL: 94/69     Depression Screen: Passed     Statin Therapy: Yes     Dysphagia Screen: [x] PASS [] FAIL     Smoking [] YES [x] NO      Afib [] YES [x] NO     Stroke Education [x] YES [] NO       User Key  (r) = Recorded By, (t) = Taken By, (c) = Cosigned By    Initials Name Provider Type    Sveta Figueroa, PT Physical Therapist                Therapy Education  Education Details: Encouraged pt to continue doing HEP for visual tracking, saccades, x 1 gaze stabilization a couple times a week. Discussed if symptoms return with that she could do Roy/Daroff exercise but explained to pt if symptoms are severe and persistent to seek medical attention as it could be a stroke.  Given: Symptoms/condition management  Program: Reinforced  How Provided: Verbal  Provided to: Patient  Level of Understanding: Verbalized, Teach back education performed    Outcome Measure Options: Dizziness Handicap Inventory, FGA (Functional Gait Assessment)  Functional Gait Assessment (FGA)  Gait Level Surface: Normal  Change in Gait Speed: Normal  Gait with Horizontal Head Turns: Normal  Gait with Vertical Head Turns: Normal  Gait and Pivot Turn: Normal  Step Over Obstacle: Normal  Gait with Narrow Base of Support: Normal  Gait with Eyes Closed: Mild Impairment  Ambulating Backwards: Normal  Steps: Mild Impairment  FGA Total Score: 28  FGA Comments: no device      Time Calculation:   Start Time: 0925  Stop Time: 1005  Time Calculation (min): 40 min  Total Timed Code Minutes- PT: 40 minute(s)  Timed Charges  69423 -  PT Neuromuscular Reeducation Minutes: 40  Total Minutes  Timed Charges Total Minutes: 40   Total Minutes: 40     Therapy Charges for Today     Code Description Service Date Service Provider Modifiers Qty    [id number] HC PT NEUROMUSC RE EDUCATION EA 15 MIN 5/12/2022 Sveta Bonner, PT GP 3          PT G-Codes  Outcome Measure Options: Dizziness Handicap Inventory, FGA (Functional Gait Assessment)  FGA Total Score: 28     OP PT Discharge Summary  Date of Discharge: 05/12/22  Reason for Discharge: All goals achieved  Outcomes Achieved: Able to achieve all goals within established timeline  Discharge  Destination: Home without follow-up, Home with home program    Patient was wearing a face mask during this therapy encounter. Therapist used appropriate personal protective equipment including mask and gloves.  Mask used was standard procedure mask. Appropriate PPE was worn during the entire therapy session. Hand hygiene was completed before and after therapy session. Patient is not in enhanced droplet precautions.         Sveta Bonner, PT  5/12/2022

## 2022-05-19 ENCOUNTER — APPOINTMENT (OUTPATIENT)
Dept: PHYSICAL THERAPY | Facility: HOSPITAL | Age: 67
End: 2022-05-19

## 2022-05-23 PROBLEM — R42 DYSEQUILIBRIUM: Status: ACTIVE | Noted: 2022-05-23

## 2022-05-23 PROBLEM — J30.9 ALLERGIC SINUSITIS: Status: ACTIVE | Noted: 2022-05-23

## 2022-05-23 PROBLEM — E04.1 THYROID NODULE: Status: ACTIVE | Noted: 2022-05-23

## 2022-05-23 RX ORDER — ALENDRONATE SODIUM 70 MG/1
TABLET ORAL
COMMUNITY
Start: 2022-05-11

## 2022-05-23 NOTE — ASSESSMENT & PLAN NOTE
Right thyroid nodule noted on MRI of the brain, will order thyroid ultrasound to further evaluate.

## 2022-05-23 NOTE — ASSESSMENT & PLAN NOTE
She complains of sinus drainage and pressure with intermittent episodes of feeling lightheaded.  She will begin Claritin and Mucinex for further treatment of symptoms.  Consider further evaluation if symptoms do not improve.

## 2022-05-23 NOTE — ASSESSMENT & PLAN NOTE
She was recently admitted for symptoms which are intermittent and steadily improving.  Neuro consult suggested PT with Epley maneuver.  No acute abnormalities noted on MRI of brain as well as CTA of the head and neck.

## 2022-05-26 ENCOUNTER — APPOINTMENT (OUTPATIENT)
Dept: PHYSICAL THERAPY | Facility: HOSPITAL | Age: 67
End: 2022-05-26

## 2022-06-02 ENCOUNTER — APPOINTMENT (OUTPATIENT)
Dept: PHYSICAL THERAPY | Facility: HOSPITAL | Age: 67
End: 2022-06-02

## 2022-06-09 ENCOUNTER — OFFICE VISIT (OUTPATIENT)
Dept: INTERNAL MEDICINE | Facility: CLINIC | Age: 67
End: 2022-06-09

## 2022-06-09 ENCOUNTER — APPOINTMENT (OUTPATIENT)
Dept: PHYSICAL THERAPY | Facility: HOSPITAL | Age: 67
End: 2022-06-09

## 2022-06-09 VITALS
SYSTOLIC BLOOD PRESSURE: 118 MMHG | DIASTOLIC BLOOD PRESSURE: 82 MMHG | TEMPERATURE: 96.9 F | HEIGHT: 69 IN | WEIGHT: 147.4 LBS | OXYGEN SATURATION: 95 % | BODY MASS INDEX: 21.83 KG/M2 | HEART RATE: 72 BPM

## 2022-06-09 DIAGNOSIS — R42 VERTIGO: Primary | ICD-10-CM

## 2022-06-09 DIAGNOSIS — M81.0 AGE-RELATED OSTEOPOROSIS WITHOUT CURRENT PATHOLOGICAL FRACTURE: Chronic | ICD-10-CM

## 2022-06-09 PROCEDURE — 99213 OFFICE O/P EST LOW 20 MIN: CPT | Performed by: NURSE PRACTITIONER

## 2022-06-09 NOTE — ASSESSMENT & PLAN NOTE
11/2021: dx by GYN (Dr. Gwen Pérez) and started on Fosteum Plus. She also walks daily and takes a Vitamin D supplement.  2/2022: started on Fosamax due to lack of improvement in bone density, she is tolerating medication well.

## 2022-06-09 NOTE — PROGRESS NOTES
"Chief Complaint  Dizziness and Osteoporosis    Subjective        Arin Lyons presents to [facility] for f/u regarding vertigo and osteoporosis.    She has attended PT for several sessions and notes improvement in vertigo, reports feeling better.  She is followed by GYN (Dr. Pérez) for osteoporosis, started on Fosamax after bone density had not improved earlier this year. She is tolerating medication without side effects.    Objective   Vital Signs:  /82 (BP Location: Left arm, Patient Position: Sitting, Cuff Size: Adult)   Pulse 72   Temp 96.9 °F (36.1 °C) (Temporal)   Ht 174 cm (68.5\")   Wt 66.9 kg (147 lb 6.4 oz)   SpO2 95%   BMI 22.09 kg/m²   Estimated body mass index is 22.09 kg/m² as calculated from the following:    Height as of this encounter: 174 cm (68.5\").    Weight as of this encounter: 66.9 kg (147 lb 6.4 oz).    BMI is within normal parameters. No other follow-up for BMI required.      Physical Exam  Constitutional:       Appearance: She is well-developed. She is not ill-appearing.   HENT:      Head: Normocephalic.      Right Ear: Hearing, tympanic membrane and external ear normal.      Left Ear: Hearing, tympanic membrane and external ear normal.      Nose: Nose normal. No nasal deformity, mucosal edema or rhinorrhea.      Right Sinus: No maxillary sinus tenderness or frontal sinus tenderness.      Left Sinus: No maxillary sinus tenderness or frontal sinus tenderness.      Mouth/Throat:      Dentition: Normal dentition.   Eyes:      General: Lids are normal.         Right eye: No discharge.         Left eye: No discharge.      Conjunctiva/sclera: Conjunctivae normal.      Right eye: No exudate.     Left eye: No exudate.  Neck:      Thyroid: No thyroid mass or thyromegaly.      Vascular: No carotid bruit.      Trachea: Trachea normal.   Cardiovascular:      Rate and Rhythm: Regular rhythm.      Pulses: Normal pulses.      Heart sounds: Normal heart sounds. No " murmur heard.  Pulmonary:      Effort: No respiratory distress.      Breath sounds: Normal breath sounds. No decreased breath sounds, wheezing, rhonchi or rales.   Abdominal:      General: Bowel sounds are normal.      Palpations: Abdomen is soft.      Tenderness: There is no abdominal tenderness.   Musculoskeletal:      Cervical back: Normal range of motion. No edema.   Lymphadenopathy:      Head:      Right side of head: No submental, submandibular, tonsillar, preauricular, posterior auricular or occipital adenopathy.      Left side of head: No submental, submandibular, tonsillar, preauricular, posterior auricular or occipital adenopathy.   Skin:     General: Skin is warm and dry.      Nails: There is no clubbing.   Neurological:      Mental Status: She is alert.   Psychiatric:         Behavior: Behavior is cooperative.        Result Review :  The following data was reviewed by: NARCISA Israel on 06/09/2022:  Common labs    Common Labsle 11/10/21 11/10/21 11/10/21 4/10/22 4/10/22 4/10/22 4/10/22 4/11/22 4/11/22 4/11/22 4/11/22    1125 1125 1125 1126 1126 1631 1631 0454 0454 0454 0454   Glucose  82   93  91    86   BUN  16   22  20    16   Creatinine  0.79   0.74  0.78    0.74   eGFR Non African Am  77            eGFR  Am  89            Sodium  140   140  138    139   Potassium  5.1   4.5  4.1    4.8   Chloride  103   105  103    105   Calcium  9.5   9.2  9.2    9.1   Total Protein  7.1            Albumin  4.6   3.80  4.30    3.60   Total Bilirubin  0.6   0.4  0.4    0.6   Alkaline Phosphatase  76   68  67    65   AST (SGOT)  25   21  21    25   ALT (SGPT)  15   17  15    14   WBC 5.7   4.88  5.58  4.36      Hemoglobin 13.7   12.7  13.5  13.1      Hematocrit 41.0   39.5  39.6  39.1      Platelets 300   254  230  210      Total Cholesterol          201 (A)    Total Cholesterol   216 (A)           Triglycerides   56       60    HDL Cholesterol   79       71 (A)    LDL Cholesterol    127 (A)       119  (A)    Hemoglobin A1C         5.00     (A) Abnormal value       Comments are available for some flowsheets but are not being displayed.           Data reviewed: Radiologic studies DEXA 2/2022          Assessment and Plan   Diagnoses and all orders for this visit:    1. Vertigo (Primary)  Assessment & Plan:  Sx have improved with PT, continue to monitor.      2. Age-related osteoporosis without current pathological fracture  Assessment & Plan:  11/2021: dx by GYN (Dr. Gwen Pérez) and started on Fosteum Plus. She also walks daily and takes a Vitamin D supplement.  2/2022: started on Fosamax due to lack of improvement in bone density, she is tolerating medication well.                 Follow Up   Return in about 6 months (around 12/9/2022).  Patient was given instructions and counseling regarding her condition or for health maintenance advice. Please see specific information pulled into the AVS if appropriate.       Answers for HPI/ROS submitted by the patient on 6/2/2022  Please describe your symptoms.: This is a followup appointment.  Have you had these symptoms before?: Yes  How long have you been having these symptoms?: Greater than 2 weeks  Please list any medications you are currently taking for this condition.: Flonase, Claritin., , Mucinex  What is the primary reason for your visit?: Other

## 2022-09-23 ENCOUNTER — OFFICE VISIT (OUTPATIENT)
Dept: INTERNAL MEDICINE | Facility: CLINIC | Age: 67
End: 2022-09-23

## 2022-09-23 VITALS
TEMPERATURE: 98 F | DIASTOLIC BLOOD PRESSURE: 82 MMHG | HEIGHT: 69 IN | OXYGEN SATURATION: 100 % | BODY MASS INDEX: 21.98 KG/M2 | SYSTOLIC BLOOD PRESSURE: 120 MMHG | HEART RATE: 87 BPM | WEIGHT: 148.4 LBS

## 2022-09-23 DIAGNOSIS — F41.9 ANXIETY: ICD-10-CM

## 2022-09-23 DIAGNOSIS — R94.31 ABNORMAL EKG: Primary | ICD-10-CM

## 2022-09-23 DIAGNOSIS — R53.1 GENERALIZED WEAKNESS: ICD-10-CM

## 2022-09-23 PROCEDURE — 99214 OFFICE O/P EST MOD 30 MIN: CPT | Performed by: NURSE PRACTITIONER

## 2022-09-23 PROCEDURE — 93000 ELECTROCARDIOGRAM COMPLETE: CPT | Performed by: NURSE PRACTITIONER

## 2022-09-23 RX ORDER — ASPIRIN 81 MG/1
81 TABLET ORAL DAILY
Qty: 30 TABLET | Refills: 0
Start: 2022-09-23

## 2022-09-24 LAB
ALBUMIN SERPL-MCNC: 4.6 G/DL (ref 3.8–4.8)
ALBUMIN/GLOB SERPL: 1.8 {RATIO} (ref 1.2–2.2)
ALP SERPL-CCNC: 66 IU/L (ref 44–121)
ALT SERPL-CCNC: 18 IU/L (ref 0–32)
AST SERPL-CCNC: 28 IU/L (ref 0–40)
BASOPHILS # BLD AUTO: 0.1 X10E3/UL (ref 0–0.2)
BASOPHILS NFR BLD AUTO: 1 %
BILIRUB SERPL-MCNC: 0.4 MG/DL (ref 0–1.2)
BUN SERPL-MCNC: 29 MG/DL (ref 8–27)
BUN/CREAT SERPL: 38 (ref 12–28)
CALCIUM SERPL-MCNC: 9.6 MG/DL (ref 8.7–10.3)
CHLORIDE SERPL-SCNC: 99 MMOL/L (ref 96–106)
CO2 SERPL-SCNC: 24 MMOL/L (ref 20–29)
CREAT SERPL-MCNC: 0.76 MG/DL (ref 0.57–1)
EGFRCR SERPLBLD CKD-EPI 2021: 85 ML/MIN/1.73
EOSINOPHIL # BLD AUTO: 0.4 X10E3/UL (ref 0–0.4)
EOSINOPHIL NFR BLD AUTO: 5 %
ERYTHROCYTE [DISTWIDTH] IN BLOOD BY AUTOMATED COUNT: 13 % (ref 11.7–15.4)
GLOBULIN SER CALC-MCNC: 2.5 G/DL (ref 1.5–4.5)
GLUCOSE SERPL-MCNC: 91 MG/DL (ref 65–99)
HCT VFR BLD AUTO: 41.3 % (ref 34–46.6)
HGB BLD-MCNC: 13.8 G/DL (ref 11.1–15.9)
IMM GRANULOCYTES # BLD AUTO: 0 X10E3/UL (ref 0–0.1)
IMM GRANULOCYTES NFR BLD AUTO: 0 %
LYMPHOCYTES # BLD AUTO: 1.7 X10E3/UL (ref 0.7–3.1)
LYMPHOCYTES NFR BLD AUTO: 23 %
MCH RBC QN AUTO: 30.7 PG (ref 26.6–33)
MCHC RBC AUTO-ENTMCNC: 33.4 G/DL (ref 31.5–35.7)
MCV RBC AUTO: 92 FL (ref 79–97)
MONOCYTES # BLD AUTO: 0.6 X10E3/UL (ref 0.1–0.9)
MONOCYTES NFR BLD AUTO: 8 %
NEUTROPHILS # BLD AUTO: 4.6 X10E3/UL (ref 1.4–7)
NEUTROPHILS NFR BLD AUTO: 63 %
PLATELET # BLD AUTO: 279 X10E3/UL (ref 150–450)
POTASSIUM SERPL-SCNC: 4.5 MMOL/L (ref 3.5–5.2)
PROT SERPL-MCNC: 7.1 G/DL (ref 6–8.5)
RBC # BLD AUTO: 4.49 X10E6/UL (ref 3.77–5.28)
SODIUM SERPL-SCNC: 139 MMOL/L (ref 134–144)
TSH SERPL DL<=0.005 MIU/L-ACNC: 1.76 UIU/ML (ref 0.45–4.5)
WBC # BLD AUTO: 7.3 X10E3/UL (ref 3.4–10.8)

## 2022-09-25 PROBLEM — F41.9 ANXIETY: Status: ACTIVE | Noted: 2022-09-25

## 2022-09-25 NOTE — ASSESSMENT & PLAN NOTE
She c/o increased anxiety with illness of her sister, has declined medication for now which we will continue to monitor.

## 2022-09-25 NOTE — PROGRESS NOTES
"Chief Complaint  Anxiety and Palpitations    Subjective        Arin Lyons presents to [facility] due to palpitations with weakness. She also notes increased anxiety,    History of Present Illness  She c/o intermittent palpitations with a sense that her heart racing and she can feel heartbeat. Her echo 4/2022 showed an EF 51-55% with normal left ventricular function.  She exercised yesterday and then mowed her yard without difficulty. However, after completing activity she c/o extreme weakness and fatigue and weakness. She also felt short of breath with improved with rest.  She does note increased anxiety over the past several weeks with illness of her sister.       Objective   Vital Signs:  /82 (BP Location: Left arm, Patient Position: Sitting, Cuff Size: Adult)   Pulse 87   Temp 98 °F (36.7 °C) (Temporal)   Ht 174 cm (68.5\")   Wt 67.3 kg (148 lb 6.4 oz)   SpO2 100%   BMI 22.24 kg/m²   Estimated body mass index is 22.24 kg/m² as calculated from the following:    Height as of this encounter: 174 cm (68.5\").    Weight as of this encounter: 67.3 kg (148 lb 6.4 oz).    BMI is within normal parameters. No other follow-up for BMI required.      Physical Exam  Constitutional:       Appearance: She is well-developed. She is not ill-appearing.   HENT:      Head: Normocephalic.      Right Ear: Hearing, tympanic membrane and external ear normal.      Left Ear: Hearing, tympanic membrane and external ear normal.      Nose: Nose normal. No nasal deformity, mucosal edema or rhinorrhea.      Right Sinus: No maxillary sinus tenderness or frontal sinus tenderness.      Left Sinus: No maxillary sinus tenderness or frontal sinus tenderness.      Mouth/Throat:      Dentition: Normal dentition.   Eyes:      General: Lids are normal.         Right eye: No discharge.         Left eye: No discharge.      Conjunctiva/sclera: Conjunctivae normal.      Right eye: No exudate.     Left eye: No " exudate.  Neck:      Thyroid: No thyroid mass or thyromegaly.      Vascular: No carotid bruit.      Trachea: Trachea normal.   Cardiovascular:      Rate and Rhythm: Regular rhythm.      Pulses: Normal pulses.      Heart sounds: Normal heart sounds. No murmur heard.  Pulmonary:      Effort: No respiratory distress.      Breath sounds: Normal breath sounds. No decreased breath sounds, wheezing, rhonchi or rales.   Abdominal:      General: Bowel sounds are normal.      Palpations: Abdomen is soft.      Tenderness: There is no abdominal tenderness.   Musculoskeletal:      Cervical back: Normal range of motion. No edema.   Lymphadenopathy:      Head:      Right side of head: No submental, submandibular, tonsillar, preauricular, posterior auricular or occipital adenopathy.      Left side of head: No submental, submandibular, tonsillar, preauricular, posterior auricular or occipital adenopathy.   Skin:     General: Skin is warm and dry.      Nails: There is no clubbing.   Neurological:      Mental Status: She is alert.   Psychiatric:         Behavior: Behavior is cooperative.        Result Review :           ECG 12 Lead    Date/Time: 9/23/2022 12:07 PM  Performed by: Gemma Dunne MA  Authorized by: Marylou Hughes APRN   Comparison: not compared with previous ECG   Previous ECG: no previous ECG available  Rhythm: sinus rhythm  Rate: normal  BPM: 75  Q waves: III (98)      Clinical impression: abnormal EKG              Assessment and Plan   Diagnoses and all orders for this visit:    1. Abnormal EKG (Primary)  -     aspirin 81 MG EC tablet; Take 1 tablet by mouth Daily.  Dispense: 30 tablet; Refill: 0  -     Ambulatory Referral to Cardiology    2. Generalized weakness  -     CBC & Differential  -     Comprehensive Metabolic Panel  -     TSH  -     Ambulatory Referral to Cardiology    3. Anxiety  Assessment & Plan:  She c/o increased anxiety with illness of her sister, has declined medication for now which we  will continue to monitor.      Other orders  -     ECG 12 Lead    With her symptoms of generalized and weakness and abnormal EKG (no previous EKG for review, telemetry from hospitalization 4/2022 reviewed), will refer to cardiology further evaluation. She will start ASA 81 mg daily until evaluation is complete.       Follow Up   No follow-ups on file.  Patient was given instructions and counseling regarding her condition or for health maintenance advice. Please see specific information pulled into the AVS if appropriate.       Answers for HPI/ROS submitted by the patient on 9/23/2022  Please describe your symptoms.: Having episodes of not feeling well, and want to make sure it's not heart related but possibly due to stress and anxiety.  Have you had these symptoms before?: Yes  How long have you been having these symptoms?: Greater than 2 weeks  Please describe any probable cause for these symptoms. : My middle sister is in the end stages of early Alzheimers and is under Hospice care.  I am on alert to go to [place] when the time gets near.  What is the primary reason for your visit?: Other

## 2022-09-30 ENCOUNTER — OFFICE VISIT (OUTPATIENT)
Dept: CARDIOLOGY | Facility: CLINIC | Age: 67
End: 2022-09-30

## 2022-09-30 VITALS
WEIGHT: 148.2 LBS | SYSTOLIC BLOOD PRESSURE: 154 MMHG | HEART RATE: 75 BPM | HEIGHT: 67 IN | DIASTOLIC BLOOD PRESSURE: 102 MMHG | BODY MASS INDEX: 23.26 KG/M2

## 2022-09-30 DIAGNOSIS — R00.2 PALPITATIONS: ICD-10-CM

## 2022-09-30 DIAGNOSIS — R07.89 CHEST PAIN, ATYPICAL: Primary | ICD-10-CM

## 2022-09-30 PROCEDURE — 93000 ELECTROCARDIOGRAM COMPLETE: CPT | Performed by: INTERNAL MEDICINE

## 2022-09-30 PROCEDURE — 99204 OFFICE O/P NEW MOD 45 MIN: CPT | Performed by: INTERNAL MEDICINE

## 2022-09-30 NOTE — PROGRESS NOTES
[facility]      Patient Name: Arin Lyons  :1955  Age: 67 y.o.  Encounter Provider:  Dorian Pacheco Jr, MD      Chief Complaint:   Chief Complaint   Patient presents with   • Abnormal ECG         HPI  Arin Lyons is a 67 y.o. female past medical history of seasonal allergies and osteoporosis who presents for initial evaluation of chest pain.  Patient had COVID in July.  She has had several episodes of chest discomfort since then.  She has trouble qualifying the exact nature of the sensation and feels that they happen for about 1 minute when they come on with inconsistent relationship to physical activity.  Some of it can feel like palpitations.  She has numbness and tingling of her left arm but this is also an inconsistent relationship to chest discomfort.  She does have known osteoarthritis of the left shoulder.  She walks several days per week but has poor balance.  She had a stress test back in  and did poorly on the treadmill as she is not very coordinated and felt very unsteady and unsafe while doing the protocol.  There was no evidence of myocardial ischemia at that time.  Due to these complaints she had an echocardiogram in 2022 which was of poor quality but showed low normal EF with no significant valvular heart disease.  She has an abnormal resting EKG with poor R wave progression and borderline low voltage QRS.  She is a former smoker who quit in her early years, drinks socially and denies illicit drug use.  No family history of premature coronary artery disease or sudden cardiac death.  No cardiac complaints at time of interview.      The following portions of the patient's history were reviewed and updated as appropriate: allergies, current medications, past family history, past medical history, past social history, past surgical history and problem list.      Review of Systems   Constitutional: Negative for chills and fever.   HENT: Negative for hoarse voice  "and sore throat.    Eyes: Negative for double vision and photophobia.   Cardiovascular: Negative for chest pain, leg swelling, near-syncope, orthopnea, palpitations, paroxysmal nocturnal dyspnea and syncope.   Respiratory: Negative for cough and wheezing.    Skin: Negative for poor wound healing and rash.   Musculoskeletal: Negative for arthritis and joint swelling.   Gastrointestinal: Negative for bloating, abdominal pain, hematemesis and hematochezia.   Neurological: Negative for dizziness and focal weakness.   Psychiatric/Behavioral: Negative for depression and suicidal ideas.       OBJECTIVE:   Vital Signs  Vitals:    09/30/22 0954   BP: (!) 154/102   Pulse: 75     Estimated body mass index is 23.56 kg/m² as calculated from the following:    Height as of this encounter: 168.9 cm (66.5\").    Weight as of this encounter: 67.2 kg (148 lb 3.2 oz).    Vitals reviewed.   Constitutional:       Appearance: Healthy appearance. Not in distress.   Neck:      Vascular: No JVR. JVD normal.   Pulmonary:      Effort: Pulmonary effort is normal.      Breath sounds: Normal breath sounds. No wheezing. No rhonchi. No rales.   Chest:      Chest wall: Not tender to palpatation.   Cardiovascular:      PMI at left midclavicular line. Normal rate. Regular rhythm. Normal S1. Normal S2.      Murmurs: There is no murmur.      No gallop. No click. No rub.   Pulses:     Intact distal pulses.   Edema:     Peripheral edema absent.   Abdominal:      General: Bowel sounds are normal.      Palpations: Abdomen is soft.      Tenderness: There is no abdominal tenderness.   Musculoskeletal: Normal range of motion.         General: No tenderness. Skin:     General: Skin is warm and dry.   Neurological:      General: No focal deficit present.      Mental Status: Alert and oriented to person, place and time.           ECG 12 Lead    Date/Time: 9/30/2022 10:23 AM  Performed by: Dorian Pacheco Jr., MD  Authorized by: Dorian Pacheco Jr., MD   Comparison: " compared with previous ECG from 9/23/2021  Comparison to previous ECG: Diagnostic criteria for anterior myocardial infarction is not present on this tracing compared to previous  Rhythm: sinus rhythm  Other findings: poor R wave progression    Clinical impression: non-specific ECG                  ASSESSMENT:     Atypical chest pain  Palpitations  Bilateral carotid stenosis  Anxiety disorder NOS  Recent COVID infection    PLAN OF CARE:     1. Atypical chest pain -typical and atypical characteristics with abnormal baseline EKG and cardiac risk factors will need evaluation.  Patient has musculoskeletal and balance issues that preclude the use of treadmill protocol.  Plan for pharmacological nuclear stress study.  2. Palpitations -Holter monitor  3. Bilateral carotid stenosis -less than 50% in late 2021.  Plan for repeat ultrasound carotid next year.  Already on aspirin.  Would suggest being on statin.  We will discuss further at next visit.  4. Anxiety disorder NOS -she has a sister in late stage Alzheimer's dementia who is progressing towards comfort care and much of this may be provoked by anxiety.  5. Recent COVID infection -all symptoms may be explained by post COVID arrhythmias.  Holter monitor planned.    Return to clinic 6 months             Discharge Medications          Accurate as of September 30, 2022 10:00 AM. If you have any questions, ask your nurse or doctor.            Continue These Medications      Instructions Start Date   alendronate 70 MG tablet  Commonly known as: FOSAMAX   No dose, route, or frequency recorded.      aspirin 81 MG EC tablet   81 mg, Oral, Daily      Calcium 600-200 MG-UNIT per tablet   1 tablet, Oral, Daily      fluticasone 50 MCG/ACT nasal spray  Commonly known as: FLONASE   2 sprays, Nasal, Daily      ibuprofen 200 MG tablet  Commonly known as: ADVIL,MOTRIN   200 mg, Oral, Every 6 Hours PRN      loratadine 10 MG tablet  Commonly known as: CLARITIN   10 mg, Oral, Daily       multivitamin with minerals tablet tablet   1 tablet, Oral, Daily      naproxen sodium 220 MG tablet  Commonly known as: ALEVE   220 mg, Oral, 2 Times Daily PRN      VITAMIN D PO   2,500 Units, Oral, Daily             Thank you for allowing me to participate in the care of your patient,      Sincerely,   Dorian Pacheco MD  [facility]  09/30/22  10:00 EDT

## 2022-10-14 ENCOUNTER — TELEPHONE (OUTPATIENT)
Dept: CARDIOLOGY | Facility: CLINIC | Age: 67
End: 2022-10-14

## 2022-10-14 ENCOUNTER — HOSPITAL ENCOUNTER (OUTPATIENT)
Dept: CARDIOLOGY | Facility: HOSPITAL | Age: 67
Discharge: HOME | End: 2022-10-14
Admitting: INTERNAL MEDICINE

## 2022-10-14 VITALS — HEIGHT: 66 IN | WEIGHT: 147.71 LBS | BODY MASS INDEX: 23.74 KG/M2

## 2022-10-14 DIAGNOSIS — R07.89 CHEST PAIN, ATYPICAL: ICD-10-CM

## 2022-10-14 LAB
BH CV NUCLEAR PRIOR STUDY: 2
BH CV REST NUCLEAR ISOTOPE DOSE: 6.5 MCI
BH CV STRESS BP STAGE 1: NORMAL
BH CV STRESS COMMENTS STAGE 1: NORMAL
BH CV STRESS DOSE REGADENOSON STAGE 1: 0.4
BH CV STRESS DURATION MIN STAGE 1: 0
BH CV STRESS DURATION SEC STAGE 1: 10
BH CV STRESS HR STAGE 1: 103
BH CV STRESS NUCLEAR ISOTOPE DOSE: 23.6 MCI
BH CV STRESS PROTOCOL 1: NORMAL
BH CV STRESS RECOVERY BP: NORMAL MMHG
BH CV STRESS RECOVERY HR: 85 BPM
BH CV STRESS STAGE 1: 1
LV EF NUC BP: 62 %
MAXIMAL PREDICTED HEART RATE: 151 BPM
PERCENT MAX PREDICTED HR: 68.21 %
STRESS BASELINE BP: NORMAL MMHG
STRESS BASELINE HR: 73 BPM
STRESS PERCENT HR: 80 %
STRESS POST EXERCISE DUR SEC: 10 SEC
STRESS POST PEAK BP: NORMAL MMHG
STRESS POST PEAK HR: 103 BPM
STRESS TARGET HR: 128 BPM

## 2022-10-14 PROCEDURE — 93016 CV STRESS TEST SUPVJ ONLY: CPT | Performed by: INTERNAL MEDICINE

## 2022-10-14 PROCEDURE — 78452 HT MUSCLE IMAGE SPECT MULT: CPT

## 2022-10-14 PROCEDURE — 25010000002 REGADENOSON 0.4 MG/5ML SOLUTION: Performed by: INTERNAL MEDICINE

## 2022-10-14 PROCEDURE — 93018 CV STRESS TEST I&R ONLY: CPT | Performed by: INTERNAL MEDICINE

## 2022-10-14 PROCEDURE — 93017 CV STRESS TEST TRACING ONLY: CPT

## 2022-10-14 PROCEDURE — 0 TECHNETIUM TETROFOSMIN KIT: Performed by: INTERNAL MEDICINE

## 2022-10-14 PROCEDURE — A9502 TC99M TETROFOSMIN: HCPCS | Performed by: INTERNAL MEDICINE

## 2022-10-14 PROCEDURE — 78452 HT MUSCLE IMAGE SPECT MULT: CPT | Performed by: INTERNAL MEDICINE

## 2022-10-14 RX ADMIN — TETROFOSMIN 1 DOSE: 1.38 INJECTION, POWDER, LYOPHILIZED, FOR SOLUTION INTRAVENOUS at 09:12

## 2022-10-14 RX ADMIN — REGADENOSON 0.4 MG: 0.08 INJECTION, SOLUTION INTRAVENOUS at 10:08

## 2022-10-14 RX ADMIN — TETROFOSMIN 1 DOSE: 1.38 INJECTION, POWDER, LYOPHILIZED, FOR SOLUTION INTRAVENOUS at 10:08

## 2022-10-14 NOTE — TELEPHONE ENCOUNTER
----- Message from NARCISA Arora sent at 10/14/2022  1:10 PM EDT -----  Please inform patient stress test images are normal.  We will wait on her heart monitor

## 2022-10-14 NOTE — TELEPHONE ENCOUNTER
Attempted to call patient, no answer.  Left a voicemail for patient to call back.  Will continue to try to reach patient.    Leisa Avery RN  [facility] T[facility]  10/14/22 14:02 EDT

## 2022-10-14 NOTE — TELEPHONE ENCOUNTER
Pt called back. Went over results and recommendations. She verbalized understanding.    Thank you,    Sumi Koroma, RN  Triage [facility]  10/14/22 14:55 EDT

## 2022-10-26 ENCOUNTER — TELEPHONE (OUTPATIENT)
Dept: CARDIOLOGY | Facility: CLINIC | Age: 67
End: 2022-10-26

## 2022-10-26 NOTE — TELEPHONE ENCOUNTER
Notified patient of results/recommendations. Patient verbalized understanding.    Patient is wondering if fosamax could potentially be causing her symptoms? She said she has been on it since May for osteoporosis.    Maribeth Doss RN  Triage [facility]

## 2022-10-26 NOTE — TELEPHONE ENCOUNTER
----- Message from Dorian Pacheco Jr., MD sent at 10/26/2022  3:38 PM EDT -----  Please let patient know there were no sustained arrhythmias on this Holter monitor.

## 2022-10-27 NOTE — TELEPHONE ENCOUNTER
Notified patient of recommendations. Patient verbalized understanding.    Maribeth Doss RN  [place] [facility] [place]

## 2023-01-09 ENCOUNTER — OFFICE VISIT (OUTPATIENT)
Dept: ORTHOPEDIC SURGERY | Facility: CLINIC | Age: 68
End: 2023-01-09
Payer: MEDICARE

## 2023-01-09 VITALS — WEIGHT: 145 LBS | HEIGHT: 66 IN | TEMPERATURE: 96.4 F | BODY MASS INDEX: 23.3 KG/M2

## 2023-01-09 DIAGNOSIS — M79.672 FOOT PAIN, LEFT: Primary | ICD-10-CM

## 2023-01-09 DIAGNOSIS — Z87.312 HISTORY OF STRESS FRACTURE: ICD-10-CM

## 2023-01-09 DIAGNOSIS — M81.0 OSTEOPOROSIS, UNSPECIFIED OSTEOPOROSIS TYPE, UNSPECIFIED PATHOLOGICAL FRACTURE PRESENCE: ICD-10-CM

## 2023-01-09 PROCEDURE — 73630 X-RAY EXAM OF FOOT: CPT | Performed by: NURSE PRACTITIONER

## 2023-01-09 PROCEDURE — 99214 OFFICE O/P EST MOD 30 MIN: CPT | Performed by: NURSE PRACTITIONER

## 2023-01-09 RX ORDER — LORATADINE 10 MG/1
CAPSULE, LIQUID FILLED ORAL AS NEEDED
COMMUNITY

## 2023-01-09 NOTE — PROGRESS NOTES
Answers for HPI/ROS submitted by the patient on 1/5/2023  Please describe your symptoms.: pain at toe line on top of left foot, off and on gripping pain. Pain behind heel off and on.  Have you had these symptoms before?: No  How long have you been having these symptoms?: 1-4 days  Please list any medications you are currently taking for this condition.: I've been taking Aleve, icing the area and also applying an arthritis cream.  Please describe any probable cause for these symptoms. : Pain started while out for walk on Jan 1, sudden gripping pain behind heel and then on top of foot, comes and goes.  Been trying to stay off of it.  What is the primary reason for your visit?: Other    Patient Name: Arin Lyons   YOB: 1955  Referring Primary Care Physician: Marylou Hughes APRN  BMI: Body mass index is 23.4 kg/m².    Chief Complaint:    Chief Complaint   Patient presents with   • Right Foot - Pain        HPI: Pt presents with diffuse pain to top of the foot that started on Jan 1. No recall of injury or trauma - she has been wearing supportive shoes and pain continues to hurt. Pt is an avid walker and has a hx of stress fracture. Pt walks 20 miles a week. Walked 1 mile this am and stopped due to pain.   Pt has osteoporosis and is an avid walker pt stopped meds for osteoporosis due to side effects at the end of November.   Arin Lyons is a 67 y.o. female who presents today for evaluation of   Chief Complaint   Patient presents with   • Right Foot - Pain         Subjective   Medications:   Home Medications:  Current Outpatient Medications on File Prior to Visit   Medication Sig   • Calcium 600-200 MG-UNIT per tablet Take 1 tablet by mouth Daily.   • fluticasone (FLONASE) 50 MCG/ACT nasal spray 2 sprays into the nostril(s) as directed by provider Daily.   • ibuprofen (ADVIL,MOTRIN) 200 MG tablet Take 200 mg by mouth Every 6 (Six) Hours As Needed for mild pain (1-3).   • Loratadine 10 MG capsule Take  by  mouth As Needed.   • naproxen sodium (ALEVE) 220 MG tablet Take 220 mg by mouth 2 (Two) Times a Day As Needed for mild pain (1-3).   • VITAMIN D PO Take 2,500 Units by mouth Daily.   • alendronate (FOSAMAX) 70 MG tablet    • aspirin 81 MG EC tablet Take 1 tablet by mouth Daily.   • loratadine (CLARITIN) 10 MG tablet Take 1 tablet by mouth Daily for 10 days.   • multivitamin with minerals tablet tablet Take 1 tablet by mouth Daily.     No current facility-administered medications on file prior to visit.     Current Medications:  Scheduled Meds:  Continuous Infusions:No current facility-administered medications for this visit.    PRN Meds:.    I have reviewed the patient's medical history in detail and updated the computerized patient record.  Review and summarization of old records includes:    Past Medical History:   Diagnosis Date   • Arthritis ongoing   • Cancer (HCC)     skin cancer removed 32yrs ago   • Osteopenia 04/2021   • Scoliosis 04/2021        Past Surgical History:   Procedure Laterality Date   • COLONOSCOPY  11/06/2019   • SKIN CANCER EXCISION Right 1990        Social History     Occupational History   • Not on file   Tobacco Use   • Smoking status: Former     Packs/day: 0.25     Years: 2.00     Pack years: 0.50     Types: Cigarettes   • Smokeless tobacco: Never   • Tobacco comments:     quit 40+ yrs ago   Vaping Use   • Vaping Use: Never used   Substance and Sexual Activity   • Alcohol use: Yes     Alcohol/week: 2.0 standard drinks     Types: 2 Glasses of wine per week     Comment: social drinks; caffeine use- coffee, green tea   • Drug use: No   • Sexual activity: Yes     Partners: Male     Birth control/protection: Post-menopausal      Social History     Social History Narrative    Lives with . Two grown daughters.         Family History   Problem Relation Age of Onset   • Hypertension Mother    • Depression Mother    • Hyperlipidemia Mother    • Kidney cancer Mother    • Stroke Father    •  Autoimmune disease Sister    • Alzheimer's disease Sister    • Rheum arthritis Maternal Aunt    • Arthritis Maternal Aunt         Rheumatoid   • Heart failure Maternal Grandmother    • Thyroid disease Maternal Grandmother        ROS: 14 point review of systems was performed and all other systems were reviewed and are negative except for documented findings in HPI and today's encounter.     Allergies: No Known Allergies  Constitutional:  Denies fever, shaking or chills   Eyes:  Denies change in visual acuity   HENT:  Denies nasal congestion or sore throat   Respiratory:  Denies cough or shortness of breath   Cardiovascular:  Denies chest pain or severe LE edema   GI:  Denies abdominal pain, nausea, vomiting, bloody stools or diarrhea   Musculoskeletal:  Numbness, tingling, pain, or loss of motor function only as noted above in history of present illness.  : Denies painful urination or hematuria  Integument:  Denies rash, lesion or ulceration   Neurologic:  Denies headache or focal weakness  Endocrine:  Denies lymphadenopathy  Psych:  Denies confusion or change in mental status   Hem:  Denies active bleeding    OBJECTIVE:  Physical Exam: 67 y.o. female  Wt Readings from Last 3 Encounters:   01/09/23 65.8 kg (145 lb)   10/14/22 67 kg (147 lb 11.3 oz)   09/30/22 67.2 kg (148 lb 3.2 oz)     Ht Readings from Last 1 Encounters:   01/09/23 167.6 cm (66\")     Body mass index is 23.4 kg/m².  Vitals:    01/09/23 1306   Temp: 96.4 °F (35.8 °C)     Vital signs reviewed.     General Appearance:    Alert, cooperative, in no acute distress                  Eyes: conjunctiva clear  ENT: external ears and nose atraumatic  CV: no peripheral edema  Resp: normal respiratory effort  Skin: no rashes or wounds; normal turgor  Psych: mood and affect appropriate  Lymph: no nodes appreciated  Neuro: gross sensation intact  Vascular:  Palpable peripheral pulse in noted extremity  Musculoskeletal Extremities: skin warm, dry and intact,  diffuse ttp top of foot at third metatarsal head, neurovascular intact cap refill intact compartments are soft no angulation or ecchymosis or signs of infection    Radiology:   Left foot 3 views done for pain with no comparison films no acute fracture    Assessment:     ICD-10-CM ICD-9-CM   1. Foot pain, left  M79.672 729.5   2. Osteoporosis, unspecified osteoporosis type, unspecified pathological fracture presence  M81.0 733.00   3. History of stress fracture  Z87.312 V13.52        Procedures  Postop shoe MRI and follow-up we will treated as a stress fracture for now avoid weightbearing offload with a cane or walker she is going out of town to help care for her sister that is at end-of-life and we will go ahead and keep in the postop shoe and treated as a fracture at this point  MDM/Plan:   The diagnosis(es), natural history, pathophysiology and treatment for diagnosis(es) were discussed. Opportunity given and questions answered.  Biomechanics of pertinent body areas discussed.  When appropriate, the use of ambulatory aids discussed.  MEDICATIONS:  The risks, benefits, warnings,side effects and alternatives of medications discussed.  Inflammation/pain control; with cold, heat, elevation and/or liniments discussed as appropriate  SPECIALTY REFERRAL  MRI.  MEDICAL RECORDS reviewed from other provider(s) for past and current medical history pertinent to this complaint.      1/9/2023    Much of this encounter note is an electronic transcription/translation of spoken language to printed text. The electronic translation of spoken language may permit erroneous, or at times, nonsensical words or phrases to be inadvertently transcribed; Although I have reviewed the note for such errors, some may still exist

## 2023-01-25 ENCOUNTER — TELEPHONE (OUTPATIENT)
Dept: ORTHOPEDIC SURGERY | Facility: CLINIC | Age: 68
End: 2023-01-25

## 2023-01-25 ENCOUNTER — TELEPHONE (OUTPATIENT)
Dept: ORTHOPEDIC SURGERY | Facility: CLINIC | Age: 68
End: 2023-01-25
Payer: MEDICARE

## 2023-01-25 NOTE — TELEPHONE ENCOUNTER
Caller: Arin Lyons    Relationship: Self    Best call back number:     Who are you requesting to speak with (clinical staff, provider,  specific staff member): MICKY FARIAS    Do you know the name of the person who called: MICKY FARIAS    What was the call regarding: MRI RESULTS    Do you require a callback: YES

## 2023-06-15 ENCOUNTER — OFFICE VISIT (OUTPATIENT)
Dept: INTERNAL MEDICINE | Facility: CLINIC | Age: 68
End: 2023-06-15
Payer: MEDICARE

## 2023-06-15 VITALS
OXYGEN SATURATION: 97 % | HEIGHT: 66 IN | WEIGHT: 148 LBS | BODY MASS INDEX: 23.78 KG/M2 | DIASTOLIC BLOOD PRESSURE: 78 MMHG | SYSTOLIC BLOOD PRESSURE: 124 MMHG | HEART RATE: 69 BPM

## 2023-06-15 DIAGNOSIS — M81.0 AGE-RELATED OSTEOPOROSIS WITHOUT CURRENT PATHOLOGICAL FRACTURE: Chronic | ICD-10-CM

## 2023-06-15 DIAGNOSIS — M46.96 INFLAMMATORY SPONDYLOPATHY OF LUMBAR REGION: ICD-10-CM

## 2023-06-15 DIAGNOSIS — Z12.31 ENCOUNTER FOR SCREENING MAMMOGRAM FOR MALIGNANT NEOPLASM OF BREAST: ICD-10-CM

## 2023-06-15 DIAGNOSIS — E78.00 HYPERCHOLESTEREMIA: ICD-10-CM

## 2023-06-15 DIAGNOSIS — R42 VERTIGO: Chronic | ICD-10-CM

## 2023-06-15 DIAGNOSIS — Z00.00 MEDICARE ANNUAL WELLNESS VISIT, SUBSEQUENT: Primary | ICD-10-CM

## 2023-06-15 DIAGNOSIS — I65.23 CAROTID STENOSIS, ASYMPTOMATIC, BILATERAL: Chronic | ICD-10-CM

## 2023-06-15 RX ORDER — GLUCOSAMINE HCL 500 MG
1 TABLET ORAL DAILY
Qty: 30 TABLET | Refills: 5
Start: 2023-06-15

## 2023-06-15 RX ORDER — CALCIUM CARBONATE 500 MG/1
1 TABLET, CHEWABLE ORAL DAILY
COMMUNITY
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
## 2023-06-16 LAB
25(OH)D3+25(OH)D2 SERPL-MCNC: 44.3 NG/ML (ref 30–100)
ALBUMIN SERPL-MCNC: 4.3 G/DL (ref 3.5–5.2)
ALBUMIN/GLOB SERPL: 1.7 G/DL
ALP SERPL-CCNC: 64 U/L (ref 39–117)
ALT SERPL-CCNC: 16 U/L (ref 1–33)
AST SERPL-CCNC: 19 U/L (ref 1–32)
BILIRUB SERPL-MCNC: 0.6 MG/DL (ref 0–1.2)
BUN SERPL-MCNC: 18 MG/DL (ref 8–23)
BUN/CREAT SERPL: 21.4 (ref 7–25)
CALCIUM SERPL-MCNC: 9.8 MG/DL (ref 8.6–10.5)
CHLORIDE SERPL-SCNC: 100 MMOL/L (ref 98–107)
CHOLEST SERPL-MCNC: 231 MG/DL (ref 0–200)
CO2 SERPL-SCNC: 27 MMOL/L (ref 22–29)
CREAT SERPL-MCNC: 0.84 MG/DL (ref 0.57–1)
EGFRCR SERPLBLD CKD-EPI 2021: 74.9 ML/MIN/1.73
ERYTHROCYTE [DISTWIDTH] IN BLOOD BY AUTOMATED COUNT: 12.5 % (ref 12.3–15.4)
GLOBULIN SER CALC-MCNC: 2.6 GM/DL
GLUCOSE SERPL-MCNC: 87 MG/DL (ref 65–99)
HCT VFR BLD AUTO: 40.9 % (ref 34–46.6)
HDLC SERPL-MCNC: 76 MG/DL (ref 40–60)
HGB BLD-MCNC: 13.8 G/DL (ref 12–15.9)
LDLC SERPL CALC-MCNC: 145 MG/DL (ref 0–100)
MCH RBC QN AUTO: 30.6 PG (ref 26.6–33)
MCHC RBC AUTO-ENTMCNC: 33.7 G/DL (ref 31.5–35.7)
MCV RBC AUTO: 90.7 FL (ref 79–97)
PLATELET # BLD AUTO: 251 10*3/MM3 (ref 140–450)
POTASSIUM SERPL-SCNC: 5.2 MMOL/L (ref 3.5–5.2)
PROT SERPL-MCNC: 6.9 G/DL (ref 6–8.5)
RBC # BLD AUTO: 4.51 10*6/MM3 (ref 3.77–5.28)
SODIUM SERPL-SCNC: 139 MMOL/L (ref 136–145)
TRIGL SERPL-MCNC: 59 MG/DL (ref 0–150)
TSH SERPL DL<=0.005 MIU/L-ACNC: 2.14 UIU/ML (ref 0.27–4.2)
VLDLC SERPL CALC-MCNC: 10 MG/DL (ref 5–40)
WBC # BLD AUTO: 4.71 10*3/MM3 (ref 3.4–10.8)

## 2023-06-17 PROBLEM — E78.00 HYPERCHOLESTEREMIA: Status: ACTIVE | Noted: 2023-06-17

## 2023-06-17 PROBLEM — E78.00 HYPERCHOLESTEREMIA: Chronic | Status: ACTIVE | Noted: 2023-06-17

## 2023-06-17 PROBLEM — M46.96 INFLAMMATORY SPONDYLOPATHY OF LUMBAR REGION: Chronic | Status: ACTIVE | Noted: 2023-06-17

## 2023-06-17 PROBLEM — M46.96 INFLAMMATORY SPONDYLOPATHY OF LUMBAR REGION: Status: ACTIVE | Noted: 2023-06-17

## 2023-06-17 NOTE — ASSESSMENT & PLAN NOTE
She has seen ENT (Dr. Ferreira) in the past and has received PT; symptoms are recurrent but stable.

## 2023-06-17 NOTE — ASSESSMENT & PLAN NOTE
11/2021: dx by GYN (Dr. Gwen Pérez) and started on Fosteum Plus. She also walks daily and takes a Vitamin D supplement.  2/2022: started on Fosamax due to lack of improvement in bone density, stopped due to side effects  Repeat DEXA scan.

## 2023-06-17 NOTE — PATIENT INSTRUCTIONS
Medicare Wellness  Personal Prevention Plan of Service     Date of Office Visit:    Encounter Provider:  NARCISA Israel  Place of Service:  [facility]  Patient Name: Arin Lyons  :  1955    As part of the Medicare Wellness portion of your visit today, we are providing you with this personalized preventive plan of services (PPPS). This plan is based upon recommendations of the [place] Preventive Services Task Force (USPSTF) and the Advisory Committee on Immunization Practices (ACIP).    This lists the preventive care services that should be considered, and provides dates of when you are due. Items listed as completed are up-to-date and do not require any further intervention.    Health Maintenance   Topic Date Due    Pneumococcal Vaccine 65+ (2 - PCV) 2020    COVID-19 Vaccine (6 - Pfizer series) 2021    ANNUAL WELLNESS VISIT  11/10/2022    DXA SCAN  2023    INFLUENZA VACCINE  10/01/2023    PAP SMEAR  2024    MAMMOGRAM  06/10/2024    LIPID PANEL  06/15/2024    TDAP/TD VACCINES (2 - Td or Tdap) 2029    COLORECTAL CANCER SCREENING  2029    HEPATITIS C SCREENING  Completed    ZOSTER VACCINE  Completed       Orders Placed This Encounter   Procedures    Mammo Screening Digital Tomosynthesis Bilateral With CAD     Standing Status:   Future     Standing Expiration Date:   6/15/2024     Order Specific Question:   Reason for Exam:     Answer:   Screening    DEXA Bone Density Axial     Standing Status:   Future     Standing Expiration Date:   6/15/2024     Order Specific Question:   Reason for Exam:     Answer:   Osteoporosis screening    CBC (No Diff)     Order Specific Question:   Release to patient     Answer:   Routine Release     Order Specific Question:   [facility] Has the patient fasted?     Answer:   Yes    Comprehensive Metabolic Panel     Order Specific Question:   Release to patient     Answer:   Routine Release     Order  Specific Question:   [facility] Has the patient fasted?     Answer:   Yes    Lipid Panel     Order Specific Question:   [facility] Has the patient fasted?     Answer:   Yes    TSH     Order Specific Question:   Release to patient     Answer:   Routine Release     Order Specific Question:   [facility] Has the patient fasted?     Answer:   Yes    Vitamin D,25-Hydroxy     Order Specific Question:   Release to patient     Answer:   Routine Release     Order Specific Question:   [facility] Has the patient fasted?     Answer:   Yes       Return in about 6 months (around 12/15/2023).

## 2023-06-17 NOTE — ASSESSMENT & PLAN NOTE
with previous labs; continue a low-fat, low-cholesterol diet.  Cardiology has suggested statin which she has declined.

## 2023-06-17 NOTE — ASSESSMENT & PLAN NOTE
Carotid doppler 8/2019 less than 50% blockage bilaterally  11/29/2021: mild left carotid stenosis, no stenosis on right  CTA head/neck 4/2022: 0-49% plaque bilaterally  Continue asa therapy along with a low-fat, low-cholesterol diet.

## 2023-09-18 ENCOUNTER — HOSPITAL ENCOUNTER (OUTPATIENT)
Dept: PET IMAGING | Facility: HOSPITAL | Age: 68
Discharge: HOME | End: 2023-09-18
Payer: MEDICARE

## 2023-09-18 ENCOUNTER — APPOINTMENT (OUTPATIENT)
Dept: WOMENS IMAGING | Facility: HOSPITAL | Age: 68
End: 2023-09-18
Payer: MEDICARE

## 2023-09-18 DIAGNOSIS — M81.0 AGE-RELATED OSTEOPOROSIS WITHOUT CURRENT PATHOLOGICAL FRACTURE: Chronic | ICD-10-CM

## 2023-09-18 PROCEDURE — 77067 SCR MAMMO BI INCL CAD: CPT | Performed by: RADIOLOGY

## 2023-09-18 PROCEDURE — 77080 DXA BONE DENSITY AXIAL: CPT

## 2023-09-18 PROCEDURE — 77080 DXA BONE DENSITY AXIAL: CPT | Performed by: RADIOLOGY

## 2023-09-18 PROCEDURE — 77063 BREAST TOMOSYNTHESIS BI: CPT | Performed by: RADIOLOGY

## 2023-11-03 ENCOUNTER — OFFICE VISIT (OUTPATIENT)
Dept: INTERNAL MEDICINE | Facility: CLINIC | Age: 68
End: 2023-11-03
Payer: MEDICARE

## 2023-11-03 VITALS
HEART RATE: 97 BPM | BODY MASS INDEX: 24.33 KG/M2 | WEIGHT: 151.4 LBS | OXYGEN SATURATION: 100 % | SYSTOLIC BLOOD PRESSURE: 138 MMHG | DIASTOLIC BLOOD PRESSURE: 86 MMHG | HEIGHT: 66 IN

## 2023-11-03 DIAGNOSIS — M85.852 OSTEOPENIA OF LEFT HIP: ICD-10-CM

## 2023-11-03 DIAGNOSIS — M25.50 POLYARTHRALGIA: ICD-10-CM

## 2023-11-03 DIAGNOSIS — L50.9 URTICARIA: Primary | ICD-10-CM

## 2023-11-03 DIAGNOSIS — M41.9 SCOLIOSIS OF LUMBAR SPINE, UNSPECIFIED SCOLIOSIS TYPE: ICD-10-CM

## 2023-11-05 PROBLEM — M41.9 SCOLIOSIS OF LUMBAR SPINE: Status: ACTIVE | Noted: 2023-11-05

## 2023-11-05 PROBLEM — M81.0 OSTEOPOROSIS WITHOUT CURRENT PATHOLOGICAL FRACTURE: Chronic | Status: RESOLVED | Noted: 2021-11-28 | Resolved: 2023-11-05

## 2023-11-05 PROBLEM — M85.852 OSTEOPENIA OF LEFT HIP: Status: ACTIVE | Noted: 2023-11-05

## 2023-11-05 PROBLEM — M41.9 SCOLIOSIS OF LUMBAR SPINE: Chronic | Status: ACTIVE | Noted: 2023-11-05

## 2023-11-05 NOTE — ASSESSMENT & PLAN NOTE
Reviewed xrays from 4/7/21 which showed scoliosis, discussed stretching exercises which she will continue. May use Tylenol as needed for pain control. Consider further evaluation if sx persist or worsen.

## 2023-11-05 NOTE — ASSESSMENT & PLAN NOTE
11/2021: dx by GYN (Dr. Gwen Pérez) and started on Fosteum Plus. She also walks daily and takes a Vitamin D supplement.  2/2022: started on Fosamax due to lack of improvement in bone density, stopped due to side effects  9/18/23: DEXA consistent with osteopenia

## 2023-11-05 NOTE — PROGRESS NOTES
"Chief Complaint  Rash and Back Pain    Subjective        Arin Lyons presents to [facility] due to a recurrent rash. She also c/o low back and left hip pain.    History of Present Illness  She notes a recurrent rash in August on bilateral upper arms which was pruritic. She has brought pictures of the rash (resolved) now which appears urticaria.   She also c/o low back pain radiating into left hip and leg. Pain is worse with prolonged standing and/or walking. Denies paresthesias.  She notes bilateral joint pain and stiffness; bilateral hands and shoulders.      Objective   Vital Signs:  /86 (BP Location: Left arm, Patient Position: Sitting, Cuff Size: Adult)   Pulse 97   Ht 167.6 cm (66\")   Wt 68.7 kg (151 lb 6.4 oz)   SpO2 100%   BMI 24.44 kg/m²   Estimated body mass index is 24.44 kg/m² as calculated from the following:    Height as of this encounter: 167.6 cm (66\").    Weight as of this encounter: 68.7 kg (151 lb 6.4 oz).       BMI is within normal parameters. No other follow-up for BMI required.      Physical Exam  Constitutional:       Appearance: She is well-developed. She is not ill-appearing.   HENT:      Head: Normocephalic.      Right Ear: Hearing, tympanic membrane and external ear normal.      Left Ear: Hearing, tympanic membrane and external ear normal.      Nose: Nose normal. No nasal deformity, mucosal edema or rhinorrhea.      Right Sinus: No maxillary sinus tenderness or frontal sinus tenderness.      Left Sinus: No maxillary sinus tenderness or frontal sinus tenderness.      Mouth/Throat:      Dentition: Normal dentition.   Eyes:      General: Lids are normal.         Right eye: No discharge.         Left eye: No discharge.      Conjunctiva/sclera: Conjunctivae normal.      Right eye: No exudate.     Left eye: No exudate.  Neck:      Thyroid: No thyroid mass or thyromegaly.      Vascular: No carotid bruit.      Trachea: Trachea normal.   Cardiovascular:     "  Rate and Rhythm: Regular rhythm.      Pulses: Normal pulses.      Heart sounds: Normal heart sounds. No murmur heard.  Pulmonary:      Effort: No respiratory distress.      Breath sounds: Normal breath sounds. No decreased breath sounds, wheezing, rhonchi or rales.   Abdominal:      General: Bowel sounds are normal.      Palpations: Abdomen is soft.      Tenderness: There is no abdominal tenderness.   Musculoskeletal:      Cervical back: Normal range of motion. No edema.      Lumbar back: Scoliosis present.   Lymphadenopathy:      Head:      Right side of head: No submental, submandibular, tonsillar, preauricular, posterior auricular or occipital adenopathy.      Left side of head: No submental, submandibular, tonsillar, preauricular, posterior auricular or occipital adenopathy.   Skin:     General: Skin is warm and dry.      Nails: There is no clubbing.   Neurological:      Mental Status: She is alert.   Psychiatric:         Behavior: Behavior is cooperative.        Result Review :  The following data was reviewed by: NARCISA Israel on 11/03/2023:  Common labs          6/15/2023    11:21   Common Labs   Glucose 87    BUN 18    Creatinine 0.84    Sodium 139    Potassium 5.2    Chloride 100    Calcium 9.8    Total Protein 6.9    Albumin 4.3    Total Bilirubin 0.6    Alkaline Phosphatase 64    AST (SGOT) 19    ALT (SGPT) 16    WBC 4.71    Hemoglobin 13.8    Hematocrit 40.9    Platelets 251    Total Cholesterol 231    Triglycerides 59    HDL Cholesterol 76    LDL Cholesterol  145      Data reviewed : Radiologic studies lumbar spine xray 4/7/21           Assessment and Plan   Diagnoses and all orders for this visit:    1. Urticaria (Primary)  Comments:  Discussed use of Zyrtec & Pepcid with acute sx (not current), f/u if sx recurr    2. Scoliosis of lumbar spine, unspecified scoliosis type  Assessment & Plan:  Reviewed xrays from 4/7/21 which showed scoliosis, discussed stretching exercises which she will  continue. May use Tylenol as needed for pain control. Consider further evaluation if sx persist or worsen.      3. Osteopenia of left hip  Assessment & Plan:  11/2021: dx by GYN (Dr. Gwen Pérez) and started on Fosteum Plus. She also walks daily and takes a Vitamin D supplement.  2/2022: started on Fosamax due to lack of improvement in bone density, stopped due to side effects  9/18/23: DEXA consistent with osteopenia      4. Polyarthralgia  Comments:  she c/o recurrent joint pain with an intermittent rash, check labs to further evaluate  Orders:  -     CBC (No Diff)  -     Comprehensive Metabolic Panel  -     Rheumatoid Factor  -     HORTENCIA With / DsDNA, RNP, Sjogrens A / B, Smith  -     C-reactive Protein  -     Sedimentation Rate             Follow Up   Return if symptoms worsen or fail to improve, for Next scheduled follow up.  Patient was given instructions and counseling regarding her condition or for health maintenance advice. Please see specific information pulled into the AVS if appropriate.         Answers submitted by the patient for this visit:  Other (Submitted on 11/2/2023)  Please describe your symptoms.: random break out of hives, never experienced before.  Also have a place on the right side of my midback thats puffy , larger than other side.  Have you had these symptoms before?: No  How long have you been having these symptoms?: Greater than 2 weeks  Primary Reason for Visit (Submitted on 11/2/2023)  What is the primary reason for your visit?: Other

## 2023-11-06 LAB
ALBUMIN SERPL-MCNC: 4.7 G/DL (ref 3.5–5.2)
ALBUMIN/GLOB SERPL: 2 G/DL
ALP SERPL-CCNC: 80 U/L (ref 39–117)
ALT SERPL-CCNC: 14 U/L (ref 1–33)
ANA SER QL: NEGATIVE
AST SERPL-CCNC: 23 U/L (ref 1–32)
BILIRUB SERPL-MCNC: 0.4 MG/DL (ref 0–1.2)
BUN SERPL-MCNC: 20 MG/DL (ref 8–23)
BUN/CREAT SERPL: 27 (ref 7–25)
CALCIUM SERPL-MCNC: 9.6 MG/DL (ref 8.6–10.5)
CHLORIDE SERPL-SCNC: 103 MMOL/L (ref 98–107)
CO2 SERPL-SCNC: 27.3 MMOL/L (ref 22–29)
CREAT SERPL-MCNC: 0.74 MG/DL (ref 0.57–1)
CRP SERPL-MCNC: 0.51 MG/DL (ref 0–0.5)
EGFRCR SERPLBLD CKD-EPI 2021: 87.2 ML/MIN/1.73
ERYTHROCYTE [DISTWIDTH] IN BLOOD BY AUTOMATED COUNT: 12.1 % (ref 12.3–15.4)
ERYTHROCYTE [SEDIMENTATION RATE] IN BLOOD BY WESTERGREN METHOD: 16 MM/HR (ref 0–30)
GLOBULIN SER CALC-MCNC: 2.4 GM/DL
GLUCOSE SERPL-MCNC: 74 MG/DL (ref 65–99)
HCT VFR BLD AUTO: 40.8 % (ref 34–46.6)
HGB BLD-MCNC: 13.5 G/DL (ref 12–15.9)
MCH RBC QN AUTO: 30.8 PG (ref 26.6–33)
MCHC RBC AUTO-ENTMCNC: 33.1 G/DL (ref 31.5–35.7)
MCV RBC AUTO: 93.2 FL (ref 79–97)
PLATELET # BLD AUTO: 301 10*3/MM3 (ref 140–450)
POTASSIUM SERPL-SCNC: 4.7 MMOL/L (ref 3.5–5.2)
PROT SERPL-MCNC: 7.1 G/DL (ref 6–8.5)
RBC # BLD AUTO: 4.38 10*6/MM3 (ref 3.77–5.28)
RHEUMATOID FACT SERPL-ACNC: 10.4 IU/ML
SODIUM SERPL-SCNC: 142 MMOL/L (ref 136–145)
WBC # BLD AUTO: 5.94 10*3/MM3 (ref 3.4–10.8)

## 2023-11-08 ENCOUNTER — TELEPHONE (OUTPATIENT)
Dept: INTERNAL MEDICINE | Facility: CLINIC | Age: 68
End: 2023-11-08
Payer: MEDICARE

## 2023-11-08 DIAGNOSIS — R42 VERTIGO: Primary | ICD-10-CM

## 2023-11-08 NOTE — TELEPHONE ENCOUNTER
"  Caller: Arin Lyons \"Jess\"    Relationship: Self    Best call back number: [phone] (Home)     What is the best time to reach you: ANYTIME, ASAP    Who are you requesting to speak with (clinical staff, provider,  specific staff member): CLINICAL STAFF/ PRANAV REYES    Do you know the name of the person who called: Arin Lyons \"Jess\"    What was the call regarding: PATIENT WOKE UP ON MONDAY WITH VERTIGO WHICH SHE HAS SUFFERED WITH FOR YEARS AND IT GOT BETTER BUT CAME BACK YESTERDAY, PATIENT IS ASKING IF PRANAV REYES CAN DO THE EPLEY MANEUVER OR IF PATIENT CAN BE REFERRED TO A SPECIALIST FOR THIS, PLEASE CALL PATIENT BACK REGARDING THIS ASAP    Is it okay if the provider responds through [facility]: PLEASE ADVISE PATIENT REGARDING THIS ASAP  "

## 2023-11-08 NOTE — TELEPHONE ENCOUNTER
Spoke with patient, she states she hasn't had an appointment with physical therapy in the last year. Would patient need to be seen for new referral?   Clear

## 2023-11-13 ENCOUNTER — HOSPITAL ENCOUNTER (OUTPATIENT)
Dept: PHYSICAL THERAPY | Facility: HOSPITAL | Age: 68
Setting detail: THERAPIES SERIES
Discharge: HOME | End: 2023-11-13
Payer: MEDICARE

## 2023-11-13 DIAGNOSIS — R26.81 UNSTEADY GAIT: ICD-10-CM

## 2023-11-13 DIAGNOSIS — R42 VERTIGO: Primary | Chronic | ICD-10-CM

## 2023-11-13 PROCEDURE — 95992 CANALITH REPOSITIONING PROC: CPT

## 2023-11-13 PROCEDURE — 97162 PT EVAL MOD COMPLEX 30 MIN: CPT

## 2023-11-13 NOTE — THERAPY EVALUATION
Outpatient Physical Therapy Vestibular Initial Evaluation  [facility]     Patient Name: Arin Lyons  : 1955  MRN: [id number]  Today's Date: 2023      Visit Date: 2023    Patient Active Problem List   Diagnosis    Carotid stenosis, asymptomatic, bilateral    Arthritis of foot    Closed nondisplaced fracture of second metatarsal bone of right foot    Closed nondisplaced fracture of third metatarsal bone of right foot    Vitamin D deficiency    Lumbar radiculopathy    Neck pain on left side    Vertigo    Degenerative disc disease, lumbar    TIA (transient ischemic attack)    Allergic sinusitis    Thyroid nodule    Dysequilibrium    Anxiety    Hypercholesteremia    Inflammatory spondylopathy of lumbar region    Osteopenia of left hip    Scoliosis of lumbar spine        Past Medical History:   Diagnosis Date    Arthritis ongoing    Cancer     skin cancer removed 32yrs ago    Osteopenia 2021    Scoliosis 2021        Past Surgical History:   Procedure Laterality Date    COLONOSCOPY  2019    SKIN CANCER EXCISION Right          Visit Dx:     ICD-10-CM ICD-9-CM   1. Vertigo  R42 780.4   2. Unsteady gait  R26.81 781.2        Patient History       Row Name 23 0846             History    Chief Complaint Balance Problems;Difficulty Walking;Dizziness  -YOU      Date Current Problem(s) Began 23  -YOU      Brief Description of Current Complaint Patient reports sudden onset of vertigo/spinning after waking up and getting out of bed on 2023.  Patient reports symptoms have improved some, she has taken some meclizine which her ENT has given her.  Patient has history of approximately 30 years of dizziness.  She has had previous treatment by physical therapy with Epley maneuver which was successful.  -YOU      Patient/Caregiver Goals Return to prior level of function;Relief from dizziness  -YOU      Hand Dominance right-handed  -YOU      How has patient tried to help current problem?  Patient has tried to do some exercises that were instructed to her from previous therapy was in April 2022.  -YOU         Pain     Pain at Present 0  -YOU         Fall Risk Assessment    Any falls in the past year: No  -YOU         Daily Activities    Primary Language English  -YOU      Are you able to read Yes  -YOU      Are you able to write Yes  -YOU      How does patient learn best? Listening;Demonstration  -YOU      Teaching needs identified Home Exercise Program;Management of Condition;Falls Prevention;Home Safety  -YOU      Pt Participated in POC and Goals Yes  -YOU         Safety    Are you being hurt, hit, or frightened by anyone at home or in your life? No  -YOU      Are you being neglected by a caregiver No  -YOU                User Key  (r) = Recorded By, (t) = Taken By, (c) = Cosigned By      Initials Name Provider Type    Sveta Figueroa PT Physical Therapist                     Vestibular Linda Zamorano Name 11/13/23 0846             Subjective    Subjective Comments Patient states she has tried to do some exercises to help reduce her symptoms.  Overall they are a little better she still complains of balance some episodes of vertigo.  -YOU         Pain Assessment    Pain Assessment No/denies pain  -YOU         Vestibular Objective    General Observation Patient arrived ambulating to therapy without assistive device independently.  -YOU         Cognition    Orientation Level Oriented to place;Oriented to time;Oriented to situation;Oriented to person  -YOU         Symptom Behavior    Type of Dizziness Imbalance;Spinning;Blurred Vision  Blurred vision at times after moving head.  -YOU      Frequency of Dizziness Several Times a Day  -YOU      Duration of Dizziness Seconds  -YOU      Aggravating Factors Lying supine;Looking up to the ceiling;Turning body quickly;Turning head quickly;Forward bending;Supine to sit  See dizziness handicap inventory  -YOU      Relieving Factors Head stationary;Rest;Slow movements  -YOU          Occulomotor Exam Fixation Present    Spontaneous Nystagmus Absent  -YOU      Head Shaking Horizontal Absent  -YOU      Head Shaking Vertical Absent  -YOU      Smooth Pursuit Normal  -YOU      Saccades Intact  First time looking down patient complained of spinning  -YOU         Vestibulo-Occular Reflex (VOR)    VOR to Slow Head Movement Normal  -YOU      VOR to Fast Head Movement/Head Thrust Test Normal  Complained of some double vision with looking to the left  -YOU         Positional Testing    Positional Testing With infrared goggles  -YOU      Vertebrobasilar Artery Screen - Right Negative  -YOU      Vertebrobasilar Artery Screen - Left Negative  -YOU      [facility]-Hallpike Right Onset Time  Brief upbeat nystagmus 1-2 times patient was first lying with head of mat.  -YOU      Harini-Hallpike Left Upbeat Nystagmus  -YOU      [facility]-Hallpike Left Onset Time  Head off mat: Strong upbeat nystagmus approximately 10 seconds.  Right into CRT/epley maneuver  -YOU         General ROM    GENERAL ROM COMMENTS Active range of motion within functional limits all extremities.  -YOU         Sensation    Sensation Left Extremity Intact;Right Extremity Intact  -YOU         Strength    Lumbar/Hip --  Overall strength 4+ out of 5  -YOU         High-level Balance    High-level Balance Other see FGA  -YOU                User Key  (r) = Recorded By, (t) = Taken By, (c) = Cosigned By      Initials Name Provider Type    YOU Sveta Bonner PT Physical Therapist                       PT Neuro       Row Name 11/13/23 0846             Transfers    Sit-Stand [place] (Transfers) independent  -YOU      Stand-Sit [place] (Transfers) independent  -YOU         Gait/Stairs (Locomotion)    [place] Level (Gait) independent;modified independence  -YOU      Deviations/Abnormal Patterns (Gait) --  no deviations except gait slows with head turns or head nods  -YOU      [place] Level (Stairs) independent  -YOU      Handrail Location (Stairs) right side  (ascending);right side (descending)  -YOU      Number of Steps (Stairs) 4  -YOU      Ascending Technique (Stairs) step-over-step  -YOU      Descending Technique (Stairs) step-over-step  -YOU                User Key  (r) = Recorded By, (t) = Taken By, (c) = Cosigned By      Initials Name Provider Type    Sveta Figueroa, PT Physical Therapist                          Therapy Education  Education Details: discussed PT POC. Provided with post CRT instructions. Instructed pt to start Roy/Daroff exercises in 2 days.  Given: Symptoms/condition management, HEP  Program: Reinforced  How Provided: Verbal  Provided to: Patient  Level of Understanding: Verbalized, Teach back education performed       OP Exercises       Row Name 11/13/23 0846             Subjective    Subjective Comments Patient states she has tried to do some exercises to help reduce her symptoms.  Overall they are a little better she still complains of balance some episodes of vertigo.  -YOU         Exercise 1    Exercise Name 1 L CRT/Epley Manuever  -YOU      Cueing 1 Verbal;Tactile  -YOU      Reps 1 1  -YOU                User Key  (r) = Recorded By, (t) = Taken By, (c) = Cosigned By      Initials Name Provider Type    Sveta Figueroa, PT Physical Therapist                                 PT OP Goals       Row Name 11/13/23 1200          PT Short Term Goals    STG Date to Achieve 12/13/23  -YOU     STG 1 Pt will be independent with HEP to improve balance and reduce vestibular symptoms.  -YOU     STG 2 Pt will ambulate 30' x 2 with head turns and then head nods without LOB, good gait speed and armswing B.  -YOU        Long Term Goals    LTG Date to Achieve 12/13/23  -YOU     LTG 1 Pt to score 27/30 on the FGA to indicate improved balance and reduced risk for falls.  -YOU     LTG 2 Pt will have < or = 10 on the Dizziness Handicap Inventory to indicate improved perceived positional and activity tolerance.  -YUO     LTG 3 Pt will be independent with advanced HEP  to improve balance and reduce vestibular symptoms.  -YOU        Time Calculation    PT Goal Re-Cert Due Date 23  -YOU               User Key  (r) = Recorded By, (t) = Taken By, (c) = Cosigned By      Initials Name Provider Type    Sveta Figueroa, PT Physical Therapist                     PT Assessment/Plan       Row Name 23 8766          PT Assessment    Functional Limitations Decreased safety during functional activities;Impaired gait;Limitation in home management;Performance in self-care ADL;Limitations in community activities  -YOU     Impairments Balance;Gait  vertigo  -YOU     Assessment Comments Patient is a 68-year-old female referred for outpatient PT with symptoms of vertigo.  Patient's symptoms began suddenly approximately 1 week ago after getting up out of bed patient had spinning symptoms.  Patient reports feelings of imbalance while walking and some spinning with position changes.  Patient's score on the dizziness handicap inventory was 44 which indicates a moderate handicap perception due to symptoms.  Patient scored 20 out of 30 on the FGA indicating some imbalance.  Patient had positive left Harini-Hallpike and therefore L Epley/CRT completed.  Patient provided with HEP.  Pt would benefit from outpatient PT for balance and vestibular rehab.  -YOU     Please refer to paper survey for additional self-reported information Yes  -YOU     Rehab Potential Good  -YOU     Patient/caregiver participated in establishment of treatment plan and goals Yes  -YOU     Patient would benefit from skilled therapy intervention Yes  -YOU        PT Plan    PT Frequency 1x/week  -YOU     Predicted Duration of Therapy Intervention (PT) 4 - 6 weeks  -YOU     Planned CPT's? PT EVAL MOD COMPLELITY: 25092;PT THER PROC EA 15 MIN: 89066;PT NEUROMUSC RE-EDUCATION EA 15 MIN: 89932;PT CANALITH REPOSITIONIN  -YOU     Physical Therapy Interventions (Optional Details) balance training;gait training;vestibular training;home  exercise program  -YOU     PT Plan Comments next PT session 12/4/23 -- due to pt out of town for [place].  -YOU               User Key  (r) = Recorded By, (t) = Taken By, (c) = Cosigned By      Initials Name Provider Type    Sveta Figueroa, PT Physical Therapist                     Outcome Measure Options: FGA (Functional Gait Assessment)  Functional Gait Assessment (FGA)  Gait Level Surface: Normal  Change in Gait Speed: Normal  Gait with Horizontal Head Turns: Mild Impairment  Gait with Vertical Head Turns: Moderate Impairment  Gait and Pivot Turn: Normal  Step Over Obstacle: Mild Impairment  Gait with Narrow Base of Support: Moderate Impairment  Gait with Eyes Closed: Moderate Impairment  Ambulating Backwards: Mild Impairment  Steps: Mild Impairment  FGA Total Score: 20  FGA Comments: No device      Time Calculation:   Start Time: 0846  Stop Time: 0932  Time Calculation (min): 46 min  Total Timed Code Minutes- PT: 46 minute(s)  Untimed Charges  PT Eval/Re-eval Minutes: 30  PT Canalith Repositioning: 15  Total Minutes  Untimed Charges Total Minutes: 45   Total Minutes: 45   Therapy Charges for Today       Code Description Service Date Service Provider Modifiers Qty    22277555625 HC PT EVAL MOD COMPLEXITY 3 11/13/2023 Sveta Bonner, PT GP 1    [id number] HC PT CANALITH REPOSITIONING PER DAY 11/13/2023 Sveta Bonner, PT GP 1            PT G-Codes  Outcome Measure Options: FGA (Functional Gait Assessment)  FGA Total Score: 20         Sveta Bonner, PT  11/13/2023

## 2023-11-20 ENCOUNTER — APPOINTMENT (OUTPATIENT)
Dept: PHYSICAL THERAPY | Facility: HOSPITAL | Age: 68
End: 2023-11-20
Payer: MEDICARE

## 2023-11-27 ENCOUNTER — APPOINTMENT (OUTPATIENT)
Dept: PHYSICAL THERAPY | Facility: HOSPITAL | Age: 68
End: 2023-11-27
Payer: MEDICARE

## 2023-11-30 ENCOUNTER — HOSPITAL ENCOUNTER (OUTPATIENT)
Dept: PHYSICAL THERAPY | Facility: HOSPITAL | Age: 68
Setting detail: THERAPIES SERIES
Discharge: HOME | End: 2023-11-30
Payer: MEDICARE

## 2023-11-30 DIAGNOSIS — R42 VERTIGO: Primary | ICD-10-CM

## 2023-11-30 DIAGNOSIS — R26.81 UNSTEADY GAIT: ICD-10-CM

## 2023-11-30 DIAGNOSIS — R42 DYSEQUILIBRIUM: ICD-10-CM

## 2023-11-30 PROCEDURE — 97112 NEUROMUSCULAR REEDUCATION: CPT

## 2023-11-30 PROCEDURE — 97530 THERAPEUTIC ACTIVITIES: CPT

## 2023-11-30 NOTE — THERAPY TREATMENT NOTE
"  Outpatient Physical Therapy Vestibular Treatment Note  [facility]     Patient Name: Arin Lyons  : 1955  MRN: [id number]  Today's Date: 2023      Visit Date: 2023    Visit Dx:     ICD-10-CM ICD-9-CM   1. Vertigo  R42 780.4   2. Unsteady gait  R26.81 781.2   3. Dysequilibrium  R42 780.4       Patient Active Problem List   Diagnosis    Carotid stenosis, asymptomatic, bilateral    Arthritis of foot    Closed nondisplaced fracture of second metatarsal bone of right foot    Closed nondisplaced fracture of third metatarsal bone of right foot    Vitamin D deficiency    Lumbar radiculopathy    Neck pain on left side    Vertigo    Degenerative disc disease, lumbar    TIA (transient ischemic attack)    Allergic sinusitis    Thyroid nodule    Dysequilibrium    Anxiety    Hypercholesteremia    Inflammatory spondylopathy of lumbar region    Osteopenia of left hip    Scoliosis of lumbar spine        Vestibular Eval       Row Name 23 1100             Pain Assessment    Pain Assessment No/denies pain  -YOU         Vestibular Objective    General Observation Patient arrived ambulating to therapy without assistive device independently.  -YOU         Cognition    Orientation Level Oriented to place;Oriented to time;Oriented to situation;Oriented to person  -YOU         Symptom Behavior    Type of Dizziness Imbalance;Unsteady with head/body turns  -YOU      Frequency of Dizziness Several Times a Day  -YOU      Duration of Dizziness Seconds  -YOU      Aggravating Factors Turning head quickly;Rolling to left  some with walking and turning head  -YOU         Positional Testing    [place]-Hallpike Right No nystagmus  -YOU      [place]-Hallpike Right Onset Time  Head off mat ~ 30 seconds of feeling \"topsy turvy\" -- like \"tumbling\". To sit no symptoms.  -YOU      Harini-Hallpike Left No nystagmus  -YOU      [place]-Hallpike Left Onset Time  no symptoms head off mat or to sit  -YOU      Horizontal Roll Test Right No nystagmus  -YOU      " "Horizontal Roll Test Right Onset Time  no symptoms  -YOU      Horizontal Roll Test Left No nystagmus  -YOU      Horizontal Roll Test Left Onset Time  no symptoms but had pt roll to L side and c/o spinning sensation.  -YOU                User Key  (r) = Recorded By, (t) = Taken By, (c) = Cosigned By      Initials Name Provider Type    Sveta Figueroa, PT Physical Therapist                       PT Neuro       Row Name 11/30/23 1100             Subjective    Subjective Comments Pt reports decreased spinning/vertigo but did have 1 episode over past 2 weeks. She did Roy/Daroff exercise and symptoms improved. Pt reports 30 years of vestibular issues and sometimes positional vertigo. Pt reports she has slept on 2 pillows since first symptoms began many years ago. c/o imbalance related to this has worsened over the years.  -YOU         Precautions and Contraindications    Precautions/Limitations --  -YOU         Balance Skills Training    SLS ~ 5-8 seconds each LE  -YOU      Sharpened Rhomberg performed ~ 10 seconds.  -YOU                User Key  (r) = Recorded By, (t) = Taken By, (c) = Cosigned By      Initials Name Provider Type    Sveta Figueroa, PT Physical Therapist                           PT Assessment/Plan       Row Name 11/30/23 1242          PT Assessment    Assessment Comments Pt reports a reduction in spinning/vertigo sensation. She still has some symptoms with rolling L and also \"tumbling\" feeling with R Harini-Hallpike. Negative L Harini-Hallpike today. L Apiani performed today due to dizziness with rolling L. Pt reports imbalance sensation for many years so provided pt with HEP for balance and vestibular.  -YOU               User Key  (r) = Recorded By, (t) = Taken By, (c) = Cosigned By      Initials Name Provider Type    Sveta Figueroa, PT Physical Therapist                        OP Exercises       Row Name 11/30/23 1247 11/30/23 1100          Subjective    Subjective Comments -- Pt reports " decreased spinning/vertigo but did have 1 episode over past 2 weeks. She did Roy/Daroff exercise and symptoms improved. Pt reports 30 years of vestibular issues and sometimes positional vertigo. Pt reports she has slept on 2 pillows since first symptoms began many years ago. c/o imbalance related to this has worsened over the years.  -YOU        Total Minutes    40557 -  PT Neuromuscular Reeducation Minutes 30  -YOU --     97873 - PT Therapeutic Activity Minutes 15  -YOU --        Exercise 8    Exercise Name 8 -- SAVAGE Michelle completed  -YOU     Cueing 8 -- Verbal;Tactile  -YOU     Reps 8 -- 1  -YOU     Additional Comments -- No increased symptoms post maneuver and pt was able to ambulate out of gym on own.  -YOU               User Key  (r) = Recorded By, (t) = Taken By, (c) = Cosigned By      Initials Name Provider Type    Sveta Figueroa, PT Physical Therapist                                    Therapy Education  Education Details: Reviewed and reminded pt to follow post CRT instructions. Instructed in HEP for target exercise (begin in 2 days, resume Roy/Daroff on Saturday), SLS and sharpened Rhomberg.  Given: Symptoms/condition management, HEP  Program: Progressed  How Provided: Verbal, Demonstration, Written  Provided to: Patient  Level of Understanding: Verbalized, Teach back education performed, Demonstrated              Time Calculation:   Start Time: 1100  Stop Time: 1145  Time Calculation (min): 45 min  Total Timed Code Minutes- PT: 45 minute(s)  Timed Charges  61272 -  PT Neuromuscular Reeducation Minutes: 30  79960 - PT Therapeutic Activity Minutes: 15  Total Minutes  Timed Charges Total Minutes: 45   Total Minutes: 45   Therapy Charges for Today       Code Description Service Date Service Provider Modifiers Qty    [id number]  PT NEUROMUSC RE EDUCATION EA 15 MIN 11/30/2023 Sveta Bonner, PT GP 2    [id number]  PT THERAPEUTIC ACT EA 15 MIN 11/30/2023 Sveta Bonner, PT GP 1                      Sveta Bonner, PT  11/30/2023

## 2023-12-04 ENCOUNTER — APPOINTMENT (OUTPATIENT)
Dept: PHYSICAL THERAPY | Facility: HOSPITAL | Age: 68
End: 2023-12-04
Payer: MEDICARE

## 2023-12-07 ENCOUNTER — HOSPITAL ENCOUNTER (OUTPATIENT)
Dept: PHYSICAL THERAPY | Facility: HOSPITAL | Age: 68
Setting detail: THERAPIES SERIES
Discharge: HOME | End: 2023-12-07
Payer: MEDICARE

## 2023-12-07 DIAGNOSIS — R42 VERTIGO: Primary | ICD-10-CM

## 2023-12-07 DIAGNOSIS — R26.81 UNSTEADY GAIT: ICD-10-CM

## 2023-12-07 PROCEDURE — 97112 NEUROMUSCULAR REEDUCATION: CPT

## 2023-12-07 NOTE — THERAPY DISCHARGE NOTE
Outpatient Physical Therapy Vestibular Treatment Note/Discharge Summary  [facility]     Patient Name: Arin Lyons  : 1955  MRN: [id number]  Today's Date: 2023      Visit Date: 2023    Visit Dx:     ICD-10-CM ICD-9-CM   1. Vertigo  R42 780.4   2. Unsteady gait  R26.81 781.2       Patient Active Problem List   Diagnosis    Carotid stenosis, asymptomatic, bilateral    Arthritis of foot    Closed nondisplaced fracture of second metatarsal bone of right foot    Closed nondisplaced fracture of third metatarsal bone of right foot    Vitamin D deficiency    Lumbar radiculopathy    Neck pain on left side    Vertigo    Degenerative disc disease, lumbar    TIA (transient ischemic attack)    Allergic sinusitis    Thyroid nodule    Dysequilibrium    Anxiety    Hypercholesteremia    Inflammatory spondylopathy of lumbar region    Osteopenia of left hip    Scoliosis of lumbar spine         Vestibular Eval       Row Name 23 1101             Subjective    Subjective Comments Pt reports no vertigo/spinning, reduction in overall vestibular symptoms. Pt reports some imbalance at times like yesterday turning head back/forth before crossing street and used walking stick only at that time for balance. Has been compliant with HEP. Pt reports many years of avoiding some movement (like sleeps on 2 pillows) due to vestibular issues. Pt wanted to d/c PT today.  -YOU         Pain Assessment    Pain Assessment --  did not mention pain  -YOU         Vestibular Objective    General Observation Patient arrived ambulating to therapy without assistive device independently.  -YOU         Cognition    Orientation Level Oriented to place;Oriented to time;Oriented to situation;Oriented to person  -YOU         Symptom Behavior    Type of Dizziness --  occasional imbalance  -YOU      Frequency of Dizziness --  infrequently  -YOU      Duration of Dizziness Seconds  -YOU      Aggravating Factors Turning head quickly  some with walking  and turning head; see Dizziness Handicap INventory  -YOU         Positional Testing    [place]-Hallpike Right No nystagmus  -YOU      [place]-Hallpike Right Onset Time  No symptoms with head off mat or to sit  -YOU      [place]-Hallpike Left No nystagmus  -YOU      Har[place]-Hallpike Left Onset Time  no symptoms head off mat or to sit  -YOU         High-level Balance    High-level Balance Other See FGA  -YOU                User Key  (r) = Recorded By, (t) = Taken By, (c) = Cosigned By      Initials Name Provider Type    Sveta Figueroa, PT Physical Therapist                       PT Neuro       Row Name 12/07/23 1101             Balance Skills Training    SLS ~ 5-8 seconds each LE  -YOU      Sharpened Rhomberg performed ~ 10 seconds.  -YOU                User Key  (r) = Recorded By, (t) = Taken By, (c) = Cosigned By      Initials Name Provider Type    Sveta Figueroa, PT Physical Therapist                           PT Assessment/Plan       Row Name 12/07/23 1245          PT Assessment    Assessment Comments Patient reports no spinning or vertigo sensation.  Right and left Har[place]-Hallpike were negative today.  Patient reports she has had a longstanding issue with imbalance for many many years and now has a walking stick that she carried like yesterday this in case she needed it.  The only time that she used the walking stick is while she was standing and turning her head quickly before she crossed a busy road.  Patient improved with scores on dizziness handicap inventory but still remained in a mild handicap level.  Patient did not have symptoms when she was lying supine with only 1 pillow.  However patient prefers to continue sleeping with 2 pillows like she has for many years.  Patient improved her score on the FGA shows no loss of balance.  Patient has met all short-term goals and 1 long-term goal.  Patient has HEP and reports compliance with that.  Patient was ready to be discharged from PT today.  -YOU               User Key  (r) =  Recorded By, (t) = Taken By, (c) = Cosigned By      Initials Name Provider Type    Sveta Figueroa, PT Physical Therapist                          OP Exercises       Row Name 12/07/23 1250 12/07/23 1101          Subjective    Subjective Comments -- Pt reports no vertigo/spinning, reduction in overall vestibular symptoms. Pt reports some imbalance at times like yesterday turning head back/forth before crossing street and used walking stick only at that time for balance. Has been compliant with HEP. Pt reports many years of avoiding some movement (like sleeps on 2 pillows) due to vestibular issues. Pt wanted to d/c PT today.  -YOU        Total Minutes    00112 -  PT Neuromuscular Reeducation Minutes 46  -YOU --        Exercise 2    Exercise Name 2 -- Roy/Daroff  -YOU     Cueing 2 -- Verbal;Tactile  -YOU     Reps 2 -- 1 time to the left and to the right and no symptoms either direction.  -YOU        Exercise 5    Exercise Name 5 -- Ambulate with head turns and head nods 30' x 2 each -independently  -YOU     Cueing 5 -- Verbal  -YOU        Exercise 9    Exercise Name 9 -- Standing in corner of room: Target exercise  -YOU     Cueing 9 -- Verbal;Demo  -YOU     Reps 9 -- 10 times head turns  -YOU     Additional Comments -- No increased symptoms  -YOU               User Key  (r) = Recorded By, (t) = Taken By, (c) = Cosigned By      Initials Name Provider Type    Sveta Figueroa PT Physical Therapist                                   PT OP Goals       Row Name 12/07/23 1101          PT Short Term Goals    STG 1 Pt will be independent with HEP to improve balance and reduce vestibular symptoms.  -YOU     STG 1 Progress Met  -YOU     STG 2 Pt will ambulate 30' x 2 with head turns and then head nods without LOB, good gait speed and armswing B.  -YOU     STG 2 Progress Met  -YOU        Long Term Goals    LTG 1 Pt to score 27/30 on the FGA to indicate improved balance and reduced risk for falls.  -YOU     LTG 1 Progress Not Met  -YOU      LTG 1 Progress Comments score of 24/30 today  -     LTG 2 Pt will have < or = 10 on the Dizziness Handicap Inventory to indicate improved perceived positional and activity tolerance.  -     LTG 2 Progress Not Met  -     LTG 2 Progress Comments score of 24 today  -     LTG 3 Pt will be independent with advanced HEP to improve balance and reduce vestibular symptoms.  -     LTG 3 Progress Met  -               User Key  (r) = Recorded By, (t) = Taken By, (c) = Cosigned By      Initials Name Provider Type    Sveta Figureoa, PT Physical Therapist                    Therapy Education  Education Details: Encouraged patient to be working on single-leg stance, sharpened Romberg, instructed standing target exercise and x 1 VOR standing.  Inform patient she could discontinue Roy-Daroff exercises.  Reviewed gait with walking stick which she holds in her left hand.  Gait on level surface, curb, ramp, and stairs practiced.  Patient independent.  Given: HEP, Symptoms/condition management, Mobility training  Program: Progressed, Reinforced  How Provided: Verbal, Demonstration  Provided to: Patient  Level of Understanding: Verbalized, Teach back education performed, Demonstrated    Outcome Measure Options: Dizziness Handicap Inventory, FGA (Functional Gait Assessment)  Functional Gait Assessment (FGA)  Gait Level Surface: Normal  Change in Gait Speed: Normal  Gait with Horizontal Head Turns: Normal  Gait with Vertical Head Turns: Normal  Gait and Pivot Turn: Normal  Step Over Obstacle: Mild Impairment  Gait with Narrow Base of Support: Moderate Impairment  Gait with Eyes Closed: Moderate Impairment  Ambulating Backwards: Normal  Steps: Mild Impairment  FGA Total Score: 24  FGA Comments: no device      Time Calculation:   Start Time: 1101  Stop Time: 1147  Time Calculation (min): 46 min  Total Timed Code Minutes- PT: 46 minute(s)  Timed Charges  43894 -  PT Neuromuscular Reeducation Minutes: 46  Total  Minutes  Timed Charges Total Minutes: 46   Total Minutes: 46     Therapy Charges for Today       Code Description Service Date Service Provider Modifiers Qty    07195690381 HC PT NEUROMUSC RE EDUCATION EA 15 MIN 12/7/2023 Sveta Bonner, PT GP 3            PT G-Codes  Outcome Measure Options: Dizziness Handicap Inventory, FGA (Functional Gait Assessment)  FGA Total Score: 24     OP PT Discharge Summary  Date of Discharge: 12/07/23  Reason for Discharge: Maximum functional potential achieved, Independent  Outcomes Achieved: Patient able to partially acheive established goals  Discharge Destination: Home without follow-up, Home with home program        Sveta Bonner, PT  12/7/2023

## 2023-12-11 ENCOUNTER — APPOINTMENT (OUTPATIENT)
Dept: PHYSICAL THERAPY | Facility: HOSPITAL | Age: 68
End: 2023-12-11
Payer: MEDICARE

## 2023-12-14 ENCOUNTER — APPOINTMENT (OUTPATIENT)
Dept: PHYSICAL THERAPY | Facility: HOSPITAL | Age: 68
End: 2023-12-14
Payer: MEDICARE

## 2023-12-18 ENCOUNTER — APPOINTMENT (OUTPATIENT)
Dept: PHYSICAL THERAPY | Facility: HOSPITAL | Age: 68
End: 2023-12-18
Payer: MEDICARE

## 2023-12-20 NOTE — TELEPHONE ENCOUNTER
Called patient regarding her results and left a message for her to call back.   no history of blood product transfusion

## 2023-12-21 ENCOUNTER — APPOINTMENT (OUTPATIENT)
Dept: PHYSICAL THERAPY | Facility: HOSPITAL | Age: 68
End: 2023-12-21
Payer: MEDICARE

## 2023-12-28 ENCOUNTER — APPOINTMENT (OUTPATIENT)
Dept: PHYSICAL THERAPY | Facility: HOSPITAL | Age: 68
End: 2023-12-28
Payer: MEDICARE

## 2024-01-09 ENCOUNTER — OFFICE VISIT (OUTPATIENT)
Dept: INTERNAL MEDICINE | Facility: CLINIC | Age: 69
End: 2024-01-09
Payer: MEDICARE

## 2024-01-09 VITALS
DIASTOLIC BLOOD PRESSURE: 76 MMHG | SYSTOLIC BLOOD PRESSURE: 118 MMHG | BODY MASS INDEX: 23.46 KG/M2 | OXYGEN SATURATION: 100 % | WEIGHT: 146 LBS | TEMPERATURE: 98.3 F | HEIGHT: 66 IN | HEART RATE: 64 BPM

## 2024-01-09 DIAGNOSIS — N30.01 ACUTE CYSTITIS WITH HEMATURIA: Primary | ICD-10-CM

## 2024-01-09 DIAGNOSIS — R35.0 URINARY FREQUENCY: ICD-10-CM

## 2024-01-09 DIAGNOSIS — N30.01 ACUTE CYSTITIS WITH HEMATURIA: ICD-10-CM

## 2024-01-09 DIAGNOSIS — R35.0 URINARY FREQUENCY: Primary | ICD-10-CM

## 2024-01-09 LAB
BACTERIA UR QL AUTO: ABNORMAL /HPF
BILIRUB BLD-MCNC: NEGATIVE MG/DL
CLARITY, POC: ABNORMAL
COLOR UR: YELLOW
EXPIRATION DATE: ABNORMAL
GLUCOSE UR STRIP-MCNC: NEGATIVE MG/DL
KETONES UR QL: NEGATIVE
LEUKOCYTE EST, POC: ABNORMAL
Lab: ABNORMAL
NITRITE UR-MCNC: NEGATIVE MG/ML
PH UR: 5.5 [PH] (ref 5–8)
PROT UR STRIP-MCNC: NEGATIVE MG/DL
RBC # UR STRIP: ABNORMAL /HPF
RBC # UR STRIP: ABNORMAL /UL
SP GR UR: 1 (ref 1–1.03)
SQUAMOUS EPITHELIAL, POC: ABNORMAL
UROBILINOGEN UR QL: ABNORMAL
WBC # UR STRIP: ABNORMAL /HPF

## 2024-01-09 PROCEDURE — 81003 URINALYSIS AUTO W/O SCOPE: CPT | Performed by: NURSE PRACTITIONER

## 2024-01-09 PROCEDURE — 81001 URINALYSIS AUTO W/SCOPE: CPT | Performed by: NURSE PRACTITIONER

## 2024-01-09 PROCEDURE — 99213 OFFICE O/P EST LOW 20 MIN: CPT | Performed by: NURSE PRACTITIONER

## 2024-01-09 PROCEDURE — 1159F MED LIST DOCD IN RCRD: CPT | Performed by: NURSE PRACTITIONER

## 2024-01-09 PROCEDURE — 1160F RVW MEDS BY RX/DR IN RCRD: CPT | Performed by: NURSE PRACTITIONER

## 2024-01-09 RX ORDER — SULFAMETHOXAZOLE AND TRIMETHOPRIM 800; 160 MG/1; MG/1
1 TABLET ORAL 2 TIMES DAILY
Qty: 10 TABLET | Refills: 0 | Status: SHIPPED | OUTPATIENT
Start: 2024-01-09 | End: 2024-01-14

## 2024-01-09 NOTE — PROGRESS NOTES
"Chief Complaint  Urinary Frequency    Subjective        Arin Lyons presents to [facility] due to urinary symptoms.    Abdominal Pain  The current episode started in the past 7 days. The onset quality is gradual. The problem occurs daily. The pain is located in the LLQ and RLQ. The pain is at a severity of 3/10. The quality of the pain is cramping and a sensation of fullness. The abdominal pain radiates to the pelvis. Associated symptoms include dysuria, frequency and myalgias. Pertinent negatives include no arthralgias, belching, constipation, diarrhea, fever, flatus, hematochezia, hematuria, melena, nausea, vomiting or weight loss. The pain is relieved by Nothing.     She experienced a viral respiratory infection several years ago since resolved.  However, she now complains of suprapubic pressure with urinary frequency over the past 4 to 5 days.  Denies hematuria.  No fever or chills.    Objective   Vital Signs:  /76 (BP Location: Left arm, Patient Position: Sitting, Cuff Size: Adult)   Pulse 64   Temp 98.3 °F (36.8 °C) (Oral)   Ht 167.6 cm (66\")   Wt 66.2 kg (146 lb)   SpO2 100%   BMI 23.57 kg/m²   Estimated body mass index is 23.57 kg/m² as calculated from the following:    Height as of this encounter: 167.6 cm (66\").    Weight as of this encounter: 66.2 kg (146 lb).       BMI is within normal parameters. No other follow-up for BMI required.      Physical Exam  Constitutional:       Appearance: She is well-developed. She is not ill-appearing.   HENT:      Head: Normocephalic.      Right Ear: Hearing, tympanic membrane and external ear normal.      Left Ear: Hearing, tympanic membrane and external ear normal.      Nose: Nose normal. No nasal deformity, mucosal edema or rhinorrhea.      Right Sinus: No maxillary sinus tenderness or frontal sinus tenderness.      Left Sinus: No maxillary sinus tenderness or frontal sinus tenderness.      Mouth/Throat:      Dentition: " Normal dentition.   Eyes:      General: Lids are normal.         Right eye: No discharge.         Left eye: No discharge.      Conjunctiva/sclera: Conjunctivae normal.      Right eye: No exudate.     Left eye: No exudate.  Neck:      Thyroid: No thyroid mass or thyromegaly.      Vascular: No carotid bruit.      Trachea: Trachea normal.   Cardiovascular:      Rate and Rhythm: Regular rhythm.      Pulses: Normal pulses.      Heart sounds: Normal heart sounds. No murmur heard.  Pulmonary:      Effort: No respiratory distress.      Breath sounds: Normal breath sounds. No decreased breath sounds, wheezing, rhonchi or rales.   Abdominal:      General: Bowel sounds are normal.      Palpations: Abdomen is soft.      Tenderness: There is no abdominal tenderness. There is no right CVA tenderness or left CVA tenderness.   Musculoskeletal:      Cervical back: Normal range of motion. No edema.   Lymphadenopathy:      Head:      Right side of head: No submental, submandibular, tonsillar, preauricular, posterior auricular or occipital adenopathy.      Left side of head: No submental, submandibular, tonsillar, preauricular, posterior auricular or occipital adenopathy.   Skin:     General: Skin is warm and dry.      Nails: There is no clubbing.   Neurological:      Mental Status: She is alert.   Psychiatric:         Behavior: Behavior is cooperative.        Result Review :  The following data was reviewed by: NARCISA Israel on 01/09/2024:  Common labs          6/15/2023    11:21 11/3/2023    09:49   Common Labs   Glucose 87  74    BUN 18  20    Creatinine 0.84  0.74    Sodium 139  142    Potassium 5.2  4.7    Chloride 100  103    Calcium 9.8  9.6    Total Protein 6.9  7.1    Albumin 4.3  4.7    Total Bilirubin 0.6  0.4    Alkaline Phosphatase 64  80    AST (SGOT) 19  23    ALT (SGPT) 16  14    WBC 4.71  5.94    Hemoglobin 13.8  13.5    Hematocrit 40.9  40.8    Platelets 251  301    Total Cholesterol 231     Triglycerides 59      HDL Cholesterol 76     LDL Cholesterol  145                    Assessment and Plan   Diagnoses and all orders for this visit:    1. Acute cystitis with hematuria (Primary)  -     sulfamethoxazole-trimethoprim (BACTRIM DS,SEPTRA DS) 800-160 MG per tablet; Take 1 tablet by mouth 2 (Two) Times a Day for 5 days.  Dispense: 10 tablet; Refill: 0    2. Urinary frequency  -     POCT urinalysis dipstick, automated    UA shows presence of leukocytes and presence of red blood cells.  She will start Bactrim and send urine for culture to further evaluate.  She will increase clear liquids and return to clinic if symptoms persist or worsen.         Follow Up   Return if symptoms worsen or fail to improve, for Next scheduled follow up.  Patient was given instructions and counseling regarding her condition or for health maintenance advice. Please see specific information pulled into the AVS if appropriate.         Answers submitted by the patient for this visit:  Primary Reason for Visit (Submitted on 1/8/2024)  What is the primary reason for your visit?: Abdominal Pain

## 2024-01-11 LAB
BACTERIA UR CULT: NORMAL
BACTERIA UR CULT: NORMAL

## 2024-02-13 NOTE — TELEPHONE ENCOUNTER
----- Message from NARCISA Lunsford sent at 1/25/2023  3:15 PM EST -----  No fracture on mri cim  ----- Message -----  From: Suraj, Laura Incoming  Sent: 1/25/2023   3:02 PM EST  To: NARCISA Lunsford      
Called pt regarding her results and left a message for her to call back.  
Informed to follow up with [facility] if not getting better and she voiced understanding.  
Spoke with patient and let her know the results. Is she to follow up with Dr. Glaser?  
1

## 2024-03-13 DIAGNOSIS — H91.93 BILATERAL HEARING LOSS, UNSPECIFIED HEARING LOSS TYPE: Primary | ICD-10-CM

## 2024-03-26 ENCOUNTER — HOSPITAL ENCOUNTER (EMERGENCY)
Facility: HOSPITAL | Age: 69
Discharge: HOME | End: 2024-03-26
Attending: EMERGENCY MEDICINE | Admitting: EMERGENCY MEDICINE
Payer: MEDICARE

## 2024-03-26 ENCOUNTER — APPOINTMENT (OUTPATIENT)
Dept: MRI IMAGING | Facility: HOSPITAL | Age: 69
End: 2024-03-26
Payer: MEDICARE

## 2024-03-26 ENCOUNTER — APPOINTMENT (OUTPATIENT)
Dept: CT IMAGING | Facility: HOSPITAL | Age: 69
End: 2024-03-26
Payer: MEDICARE

## 2024-03-26 VITALS
BODY MASS INDEX: 21.97 KG/M2 | HEIGHT: 67 IN | HEART RATE: 69 BPM | DIASTOLIC BLOOD PRESSURE: 75 MMHG | SYSTOLIC BLOOD PRESSURE: 139 MMHG | TEMPERATURE: 97.4 F | WEIGHT: 140 LBS | RESPIRATION RATE: 18 BRPM | OXYGEN SATURATION: 100 %

## 2024-03-26 DIAGNOSIS — H53.8 BLURRY VISION, LEFT EYE: ICD-10-CM

## 2024-03-26 DIAGNOSIS — R42 DIZZINESS: Primary | ICD-10-CM

## 2024-03-26 DIAGNOSIS — H93.12 LEFT-SIDED TINNITUS: ICD-10-CM

## 2024-03-26 LAB
ALBUMIN SERPL-MCNC: 4.3 G/DL (ref 3.5–5.2)
ALBUMIN/GLOB SERPL: 1.4 G/DL
ALP SERPL-CCNC: 83 U/L (ref 39–117)
ALT SERPL W P-5'-P-CCNC: 16 U/L (ref 1–33)
ANION GAP SERPL CALCULATED.3IONS-SCNC: 9.9 MMOL/L (ref 5–15)
AST SERPL-CCNC: 23 U/L (ref 1–32)
BASOPHILS # BLD AUTO: 0.06 10*3/MM3 (ref 0–0.2)
BASOPHILS NFR BLD AUTO: 1 % (ref 0–1.5)
BILIRUB SERPL-MCNC: 0.3 MG/DL (ref 0–1.2)
BUN SERPL-MCNC: 18 MG/DL (ref 8–23)
BUN/CREAT SERPL: 25.7 (ref 7–25)
CALCIUM SPEC-SCNC: 9.4 MG/DL (ref 8.6–10.5)
CHLORIDE SERPL-SCNC: 101 MMOL/L (ref 98–107)
CO2 SERPL-SCNC: 27.1 MMOL/L (ref 22–29)
CREAT SERPL-MCNC: 0.7 MG/DL (ref 0.57–1)
DEPRECATED RDW RBC AUTO: 39.2 FL (ref 37–54)
EGFRCR SERPLBLD CKD-EPI 2021: 92.6 ML/MIN/1.73
EOSINOPHIL # BLD AUTO: 0.43 10*3/MM3 (ref 0–0.4)
EOSINOPHIL NFR BLD AUTO: 7.5 % (ref 0.3–6.2)
ERYTHROCYTE [DISTWIDTH] IN BLOOD BY AUTOMATED COUNT: 12.2 % (ref 12.3–15.4)
GLOBULIN UR ELPH-MCNC: 3 GM/DL
GLUCOSE SERPL-MCNC: 88 MG/DL (ref 65–99)
HCT VFR BLD AUTO: 38.2 % (ref 34–46.6)
HGB BLD-MCNC: 12.6 G/DL (ref 12–15.9)
HOLD SPECIMEN: NORMAL
HOLD SPECIMEN: NORMAL
IMM GRANULOCYTES # BLD AUTO: 0.01 10*3/MM3 (ref 0–0.05)
IMM GRANULOCYTES NFR BLD AUTO: 0.2 % (ref 0–0.5)
LYMPHOCYTES # BLD AUTO: 2.17 10*3/MM3 (ref 0.7–3.1)
LYMPHOCYTES NFR BLD AUTO: 37.7 % (ref 19.6–45.3)
MCH RBC QN AUTO: 29.4 PG (ref 26.6–33)
MCHC RBC AUTO-ENTMCNC: 33 G/DL (ref 31.5–35.7)
MCV RBC AUTO: 89 FL (ref 79–97)
MONOCYTES # BLD AUTO: 0.48 10*3/MM3 (ref 0.1–0.9)
MONOCYTES NFR BLD AUTO: 8.3 % (ref 5–12)
NEUTROPHILS NFR BLD AUTO: 2.6 10*3/MM3 (ref 1.7–7)
NEUTROPHILS NFR BLD AUTO: 45.3 % (ref 42.7–76)
NRBC BLD AUTO-RTO: 0 /100 WBC (ref 0–0.2)
PLATELET # BLD AUTO: 287 10*3/MM3 (ref 140–450)
PMV BLD AUTO: 9.8 FL (ref 6–12)
POTASSIUM SERPL-SCNC: 4.6 MMOL/L (ref 3.5–5.2)
PROT SERPL-MCNC: 7.3 G/DL (ref 6–8.5)
RBC # BLD AUTO: 4.29 10*6/MM3 (ref 3.77–5.28)
SODIUM SERPL-SCNC: 138 MMOL/L (ref 136–145)
WBC NRBC COR # BLD AUTO: 5.75 10*3/MM3 (ref 3.4–10.8)
WHOLE BLOOD HOLD COAG: NORMAL
WHOLE BLOOD HOLD SPECIMEN: NORMAL

## 2024-03-26 PROCEDURE — 36415 COLL VENOUS BLD VENIPUNCTURE: CPT

## 2024-03-26 PROCEDURE — 80053 COMPREHEN METABOLIC PANEL: CPT

## 2024-03-26 PROCEDURE — 70553 MRI BRAIN STEM W/O & W/DYE: CPT

## 2024-03-26 PROCEDURE — A9577 INJ MULTIHANCE: HCPCS | Performed by: EMERGENCY MEDICINE

## 2024-03-26 PROCEDURE — 99285 EMERGENCY DEPT VISIT HI MDM: CPT

## 2024-03-26 PROCEDURE — 0 GADOBENATE DIMEGLUMINE 529 MG/ML SOLUTION: Performed by: EMERGENCY MEDICINE

## 2024-03-26 PROCEDURE — 25510000001 IOPAMIDOL PER 1 ML: Performed by: EMERGENCY MEDICINE

## 2024-03-26 PROCEDURE — 70496 CT ANGIOGRAPHY HEAD: CPT

## 2024-03-26 PROCEDURE — 85025 COMPLETE CBC W/AUTO DIFF WBC: CPT

## 2024-03-26 RX ORDER — METHYLPREDNISOLONE 4 MG/1
TABLET ORAL
Qty: 21 TABLET | Refills: 0 | Status: SHIPPED | OUTPATIENT
Start: 2024-03-26

## 2024-03-26 RX ADMIN — GADOBENATE DIMEGLUMINE 13 ML: 529 INJECTION, SOLUTION INTRAVENOUS at 21:12

## 2024-03-26 RX ADMIN — IOPAMIDOL 95 ML: 755 INJECTION, SOLUTION INTRAVENOUS at 20:16

## 2024-03-26 NOTE — ED NOTES
Pt to er via pv from home. Pt noticed ringing in left eye and blurry in left eye x2 weeks. Pt has vertigo and is also c/o increased balance problems.

## 2024-03-26 NOTE — ED PROVIDER NOTES
EMERGENCY DEPARTMENT ENCOUNTER    Room Number:  39/39  PCP: Marylou Hughes APRN    HPI:  Chief Complaint: Dizziness  A complete HPI/ROS/PMH/PSH/SH/FH are unobtainable due to: None  Context: Arin Lyons is a 69 y.o. female who presents to the ED c/o acute dizziness.  This is been ongoing for the past 3 weeks.  Is a 40-year history of BPPV.  However this is different and that it has been lasting longer and she is also developed some blurred vision to her left eye.  Her tenderness to her left ear is constant.  She reports having intermittent blurriness to the left eye for the past 2 weeks.  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        PAST MEDICAL HISTORY  Active Ambulatory Problems     Diagnosis Date Noted    Carotid stenosis, asymptomatic, bilateral 08/20/2019    Arthritis of foot 09/24/2020    Closed nondisplaced fracture of second metatarsal bone of right foot 09/24/2020    Closed nondisplaced fracture of third metatarsal bone of right foot 09/24/2020    Vitamin D deficiency 09/24/2020    Lumbar radiculopathy 11/28/2021    Neck pain on left side 11/28/2021    Vertigo 11/28/2021    Degenerative disc disease, lumbar 12/12/2021    TIA (transient ischemic attack) 04/10/2022    Allergic sinusitis 05/23/2022    Thyroid nodule 05/23/2022    Dysequilibrium 05/23/2022    Anxiety 09/25/2022    Hypercholesteremia 06/17/2023    Inflammatory spondylopathy of lumbar region 06/17/2023    Osteopenia of left hip 11/05/2023    Scoliosis of lumbar spine 11/05/2023     Resolved Ambulatory Problems     Diagnosis Date Noted    Osteoporosis without current pathological fracture 11/28/2021     Past Medical History:   Diagnosis Date    Arthritis ongoing    Cancer     Osteopenia 04/2021    Scoliosis 04/2021         PAST SURGICAL HISTORY  Past Surgical History:   Procedure Laterality Date    COLONOSCOPY  11/06/2019    SKIN CANCER  EXCISION Right 1990         FAMILY HISTORY  Family History   Problem Relation Age of Onset    Hypertension Mother     Depression Mother     Hyperlipidemia Mother     Kidney cancer Mother     Cancer Mother         kidney cancer    Stroke Father     Autoimmune disease Sister     Alzheimer's disease Sister     Heart failure Maternal Grandmother     Thyroid disease Maternal Grandmother     Rheum arthritis Maternal Aunt     Arthritis Maternal Aunt         Rheumatoid         SOCIAL HISTORY  Social History     Socioeconomic History    Marital status:    Tobacco Use    Smoking status: Former     Current packs/day: 0.00     Average packs/day: 0.3 packs/day for 2.0 years (0.5 ttl pk-yrs)     Types: Cigarettes     Start date:      Quit date:      Years since quittin.2    Smokeless tobacco: Never    Tobacco comments:     smoked when I was an irresponsible young adult many years ago!   Vaping Use    Vaping status: Never Used   Substance and Sexual Activity    Alcohol use: Yes     Alcohol/week: 2.0 standard drinks of alcohol     Types: 2 Glasses of wine per week     Comment: social drinks    Drug use: No    Sexual activity: Yes     Partners: Male     Birth control/protection: Post-menopausal         ALLERGIES  Patient has no known allergies.        REVIEW OF SYSTEMS  Review of Systems     Included in HPI  All systems reviewed and negative except for those discussed in HPI.       PHYSICAL EXAM  ED Triage Vitals   Temp Heart Rate Resp BP SpO2   24 1507 24 1507 24 1507 24 1530 24 1507   97.4 °F (36.3 °C) 77 18 153/91 98 %      Temp src Heart Rate Source Patient Position BP Location FiO2 (%)   24 1507 -- -- -- --   Tympanic           Physical Exam      GENERAL: no acute distress  HENT: nares patent  EYES: no scleral icterus  CV: regular rhythm, normal rate  RESPIRATORY: normal effort, clear to auscultation bilaterally  ABDOMEN: soft  MUSCULOSKELETAL: no deformity  NEURO:    Recent and remote memory functions are normal. The patient is attentive with normal concentration. Language is fluent. Speech is clear. The speech is non-dysarthric. Fund of knowledge is normal.   Symmetric smile with no facial droop.  Eyes close shut strongly bilaterally.  Symmetric eyebrow raise bilaterally.  EOMI, PERRL  CN II-XII grossly normal otherwise.  5/5 strength to extremities.  No pronator drift.  Intact FNF.  No meningismus.  PSYCH:  calm, cooperative  SKIN: warm, dry    Vital signs and nursing notes reviewed.          LAB RESULTS  Recent Results (from the past 24 hour(s))   POC Glucose Once    Collection Time: 03/26/24  2:30 PM    Specimen: Blood   Result Value Ref Range    Glucose 111 70 - 130 mg/dL   Comprehensive Metabolic Panel    Collection Time: 03/26/24  3:47 PM    Specimen: Blood   Result Value Ref Range    Glucose 88 65 - 99 mg/dL    BUN 18 8 - 23 mg/dL    Creatinine 0.70 0.57 - 1.00 mg/dL    Sodium 138 136 - 145 mmol/L    Potassium 4.6 3.5 - 5.2 mmol/L    Chloride 101 98 - 107 mmol/L    CO2 27.1 22.0 - 29.0 mmol/L    Calcium 9.4 8.6 - 10.5 mg/dL    Total Protein 7.3 6.0 - 8.5 g/dL    Albumin 4.3 3.5 - 5.2 g/dL    ALT (SGPT) 16 1 - 33 U/L    AST (SGOT) 23 1 - 32 U/L    Alkaline Phosphatase 83 39 - 117 U/L    Total Bilirubin 0.3 0.0 - 1.2 mg/dL    Globulin 3.0 gm/dL    A/G Ratio 1.4 g/dL    BUN/Creatinine Ratio 25.7 (H) 7.0 - 25.0    Anion Gap 9.9 5.0 - 15.0 mmol/L    eGFR 92.6 >60.0 mL/min/1.73   Green Top (Gel)    Collection Time: 03/26/24  3:47 PM   Result Value Ref Range    Extra Tube Hold for add-ons.    Lavender Top    Collection Time: 03/26/24  3:47 PM   Result Value Ref Range    Extra Tube hold for add-on    Gold Top - SST    Collection Time: 03/26/24  3:47 PM   Result Value Ref Range    Extra Tube Hold for add-ons.    Light Blue Top    Collection Time: 03/26/24  3:47 PM   Result Value Ref Range    Extra Tube Hold for add-ons.    CBC Auto Differential    Collection Time: 03/26/24   3:47 PM    Specimen: Blood   Result Value Ref Range    WBC 5.75 3.40 - 10.80 10*3/mm3    RBC 4.29 3.77 - 5.28 10*6/mm3    Hemoglobin 12.6 12.0 - 15.9 g/dL    Hematocrit 38.2 34.0 - 46.6 %    MCV 89.0 79.0 - 97.0 fL    MCH 29.4 26.6 - 33.0 pg    MCHC 33.0 31.5 - 35.7 g/dL    RDW 12.2 (L) 12.3 - 15.4 %    RDW-SD 39.2 37.0 - 54.0 fl    MPV 9.8 6.0 - 12.0 fL    Platelets 287 140 - 450 10*3/mm3    Neutrophil % 45.3 42.7 - 76.0 %    Lymphocyte % 37.7 19.6 - 45.3 %    Monocyte % 8.3 5.0 - 12.0 %    Eosinophil % 7.5 (H) 0.3 - 6.2 %    Basophil % 1.0 0.0 - 1.5 %    Immature Grans % 0.2 0.0 - 0.5 %    Neutrophils, Absolute 2.60 1.70 - 7.00 10*3/mm3    Lymphocytes, Absolute 2.17 0.70 - 3.10 10*3/mm3    Monocytes, Absolute 0.48 0.10 - 0.90 10*3/mm3    Eosinophils, Absolute 0.43 (H) 0.00 - 0.40 10*3/mm3    Basophils, Absolute 0.06 0.00 - 0.20 10*3/mm3    Immature Grans, Absolute 0.01 0.00 - 0.05 10*3/mm3    nRBC 0.0 0.0 - 0.2 /100 WBC       Ordered the above labs and reviewed the results.        RADIOLOGY  No Radiology Exams Resulted Within Past 24 Hours    Ordered the above noted radiological studies. Reviewed by me in PACS.        MEDICATIONS GIVEN IN ER  Medications - No data to display      ORDERS PLACED DURING THIS VISIT:  Orders Placed This Encounter   Procedures    MRI Brain With & Without Contrast    CT Angiogram Head    [place] Draw    Comprehensive Metabolic Panel    CBC Auto Differential    Inpatient Neurology Consult General    CBC & Differential    Green Top (Gel)    Lavender Top    Gold Top - SST    Light Blue Top         OUTPATIENT MEDICATION MANAGEMENT:  No current Epic-ordered facility-administered medications on file.     Current Outpatient Medications Ordered in Epic   Medication Sig Dispense Refill    aspirin 81 MG EC tablet Take 1 tablet by mouth Daily. 30 tablet 0    calcium carbonate (TUMS) 500 MG chewable tablet Chew 1 tablet Daily. 750      fluticasone (FLONASE) 50 MCG/ACT nasal spray 2 sprays into the  nostril(s) as directed by provider Daily.      Loratadine 10 MG capsule Take  by mouth As Needed.      naproxen sodium (ALEVE) 220 MG tablet Take 1 tablet by mouth 2 (Two) Times a Day As Needed for Mild Pain. Or Advil rotation as needed      Vitamin D-Vitamin K (VITAMIN K2-VITAMIN D3 PO) Take 1 tablet by mouth Daily. 1000 IU/ 45 mcg         PROCEDURES  Procedures          MEDICAL DECISION MAKING, PROGRESS, and CONSULTS    Discussion below represents my analysis of pertinent findings related to patient's condition, differential diagnosis, treatment plan and final disposition.          Differential diagnosis:    Stroke, Ménière's, BPPV, vertiginous migraine               Independent interpretation of labs, radiology studies, and discussions with consultants:  ED Course as of 03/28/24 1548   Tue Mar 26, 2024   2012 I discussed the case with Dr. Mane, general neurology.  He recommends CT angiogram of the head as well as MRI of the brain with and without contrast. [TD]   2235 CT angiogram of the head shows no evidence of any intracranial process. [TD]      ED Course User Index  [TD] Cristobal Keita II, MD       MRI is acutely negative.    I think the patient is safe for discharge home.  I think she would benefit from ENT follow-up.  We will trial steroids for her given her persistent symptoms.  She will continue seeing Dramamine which she has available at home.        DIAGNOSIS  Final diagnoses:   Dizziness   Left-sided tinnitus   Blurry vision, left eye         DISPOSITION  DISCHARGE    FOLLOW-UP  Marylou Hughes, APRN  [address]  [address]  [facility] [id number] [phone]    Schedule an appointment as soon as possible for a visit   As needed    Cameron Boucher MD  [address]  [facility] [id number] [phone]    Call in 1 day  to schedule a follow up appointment         Medication List        New Prescriptions      methylPREDNISolone 4 MG dose pack  Commonly known as:  MEDROL  Take as directed on package instructions.               Where to Get Your Medications        These medications were sent to [facility] [id number] - [place], [place] - [address] - [phone] PH - [phone] FX  [address], [facility] [address]      Phone: [phone]   methylPREDNISolone 4 MG dose pack             Latest Documented Vital Signs:  As of 18:11 EDT  BP- 153/91 HR- 77 Temp- 97.4 °F (36.3 °C) (Tympanic) O2 sat- 98%      --    Please note that portions of this were completed with a voice recognition program.       Note Disclaimer: At [facility], we believe that sharing information builds trust and better relationships. You are receiving this note because you are receiving care at [facility] or recently visited. It is possible you will see health information before a provider has talked with you about it. This kind of information can be easy to misunderstand. To help you fully understand what it means for your health, we urge you to discuss this note with your provider.         Cristobal Keita II, MD  03/28/24 0188

## 2024-06-03 ENCOUNTER — OFFICE VISIT (OUTPATIENT)
Dept: INTERNAL MEDICINE | Facility: CLINIC | Age: 69
End: 2024-06-03
Payer: MEDICARE

## 2024-06-03 ENCOUNTER — HOSPITAL ENCOUNTER (OUTPATIENT)
Facility: HOSPITAL | Age: 69
Discharge: HOME | End: 2024-06-03
Payer: MEDICARE

## 2024-06-03 VITALS
DIASTOLIC BLOOD PRESSURE: 76 MMHG | BODY MASS INDEX: 23.61 KG/M2 | WEIGHT: 150.4 LBS | SYSTOLIC BLOOD PRESSURE: 124 MMHG | OXYGEN SATURATION: 98 % | HEART RATE: 69 BPM | TEMPERATURE: 98.2 F | HEIGHT: 67 IN

## 2024-06-03 DIAGNOSIS — M54.6 ACUTE RIGHT-SIDED THORACIC BACK PAIN: Primary | ICD-10-CM

## 2024-06-03 PROCEDURE — 72072 X-RAY EXAM THORAC SPINE 3VWS: CPT

## 2024-06-04 LAB
APPEARANCE UR: CLEAR
BILIRUB UR QL STRIP: NEGATIVE
COLOR UR: YELLOW
GLUCOSE UR QL STRIP: NEGATIVE
HGB UR QL STRIP: NEGATIVE
KETONES UR QL STRIP: NEGATIVE
LEUKOCYTE ESTERASE UR QL STRIP: NEGATIVE
NITRITE UR QL STRIP: NEGATIVE
PH UR STRIP: 6 [PH] (ref 5–8)
PROT UR QL STRIP: NEGATIVE
SP GR UR STRIP: 1.01 (ref 1–1.03)
UROBILINOGEN UR STRIP-MCNC: NORMAL MG/DL

## 2024-06-17 NOTE — PROGRESS NOTES
Chief Complaint  Back Pain (Right side - hx of scoliosis)  Greg Lyons presents to [facility]  Back Pain      History of Present Illness  The patient is a 69-year-old female who presents for evaluation of right-sided thoracic pain.    The patient reports experiencing discomfort on the right side of her mid-back, akin to a shifting sensation when lying on her back. This discomfort has been persisting for several months, with no preceding injury or trauma. She typically sleeps on her right side, however, the discomfort occasionally disrupts her sleep. She first noticed the discomfort while performing floor exercises. She also experiences flare-ups in the same area of her SI joint, which she manages effectively. The pain, which she rates as 4 on a scale of 1 to 10, does not radiate to the arm or leg, and does not radiate to the front. She denies any sharp, stabbing pain in the area. Recently, the discomfort has been constant, with no identifiable factor that exacerbates it. She alternates between Aleve and Tylenol, but not on a daily basis.    Supplemental Information  She got bit by something 2 or 3 days ago. They are not sore, but they itch. She denies any urinary symptoms. She had dizziness and had to go to the ER. She went to [facility] and had balance testing. She has BPPV in both ears. They recommended a physical therapist, but she declined. She has all the exercises that she got from [facility]. She does not have vertigo every day. She knows what to do and when she does have an episode, she starts doing her home treatments. She had blurry vision in her left eye. She just had her yearly eye exam. They told her she did not have an infection. She was put on a steroid and it did seem to help. She does have cataracts.   Her mother had back pain and was diagnosed with kidney cancer in her mid 80s.    Objective   Vital Signs:  /76 (BP Location: Left arm,  "Patient Position: Sitting, Cuff Size: Adult)   Pulse 69   Temp 98.2 °F (36.8 °C)   Ht 170.2 cm (67\")   Wt 68.2 kg (150 lb 6.4 oz)   SpO2 98%   BMI 23.56 kg/m²   Estimated body mass index is 23.56 kg/m² as calculated from the following:    Height as of this encounter: 170.2 cm (67\").    Weight as of this encounter: 68.2 kg (150 lb 6.4 oz).    BMI is within normal parameters. No other follow-up for BMI required.      Physical Exam  Constitutional:       Appearance: She is well-developed. She is not ill-appearing.   HENT:      Head: Normocephalic.      Right Ear: Hearing, tympanic membrane and external ear normal.      Left Ear: Hearing, tympanic membrane and external ear normal.      Nose: Nose normal. No nasal deformity, mucosal edema or rhinorrhea.      Right Sinus: No maxillary sinus tenderness or frontal sinus tenderness.      Left Sinus: No maxillary sinus tenderness or frontal sinus tenderness.      Mouth/Throat:      Dentition: Normal dentition.   Eyes:      General: Lids are normal.         Right eye: No discharge.         Left eye: No discharge.      Conjunctiva/sclera: Conjunctivae normal.      Right eye: No exudate.     Left eye: No exudate.  Neck:      Thyroid: No thyroid mass or thyromegaly.      Vascular: No carotid bruit.      Trachea: Trachea normal.   Cardiovascular:      Rate and Rhythm: Regular rhythm.      Pulses: Normal pulses.      Heart sounds: Normal heart sounds. No murmur heard.  Pulmonary:      Effort: No respiratory distress.      Breath sounds: Normal breath sounds. No decreased breath sounds, wheezing, rhonchi or rales.   Abdominal:      General: Bowel sounds are normal.      Palpations: Abdomen is soft.      Tenderness: There is no abdominal tenderness.   Musculoskeletal:      Cervical back: Normal range of motion. No edema.      Thoracic back: Tenderness present.   Lymphadenopathy:      Head:      Right side of head: No submental, submandibular, tonsillar, preauricular, posterior " auricular or occipital adenopathy.      Left side of head: No submental, submandibular, tonsillar, preauricular, posterior auricular or occipital adenopathy.   Skin:     General: Skin is warm and dry.      Nails: There is no clubbing.   Neurological:      Mental Status: She is alert.   Psychiatric:         Behavior: Behavior is cooperative.        Physical Exam  Vital Signs  Vitals show a blood pressure of 124/76.    Result Review :  The following data was reviewed by: NARCISA Israel on 06/03/2024:  Common labs          11/3/2023    09:49 3/26/2024    15:47   Common Labs   Glucose 74  88    BUN 20  18    Creatinine 0.74  0.70    Sodium 142  138    Potassium 4.7  4.6    Chloride 103  101    Calcium 9.6  9.4    Total Protein 7.1     Albumin 4.7  4.3    Total Bilirubin 0.4  0.3    Alkaline Phosphatase 80  83    AST (SGOT) 23  23    ALT (SGPT) 14  16    WBC 5.94  5.75    Hemoglobin 13.5  12.6    Hematocrit 40.8  38.2    Platelets 301  287           Results  Laboratory Studies  Blood count is normal. Kidney and liver function are normal. Blood sugar is normal.    Imaging  MRI of the brain showed no evidence of a brain tumor, bleed, or mass. CTA of the head was normal.             Assessment and Plan   Diagnoses and all orders for this visit:    1. Acute right-sided thoracic back pain (Primary)  -     XR Spine Thoracic 3 View  -     Urinalysis With Microscopic If Indicated (No Culture) - Urine, Clean Catch      Assessment & Plan  1. Right-sided thoracic pain with a history of scoliosis.  An x-ray of the thoracic spine has been ordered. Additionally, a urine sample will be collected to rule out hematuria.    Follow-up  A follow-up appointment is scheduled for 6 months from now.         Follow Up   Return in about 6 months (around 12/3/2024), or if symptoms worsen or fail to improve, for wellness.  Patient was given instructions and counseling regarding her condition or for health maintenance advice. Please see  specific information pulled into the AVS if appropriate.     Patient or patient representative verbalized consent for the use of Ambient Listening during the visit with  NARCISA Israel for chart documentation. 6/16/2024  21:31 EDT  Answers submitted by the patient for this visit:  Primary Reason for Visit (Submitted on 5/27/2024)  What is the primary reason for your visit?: Back Pain

## 2024-06-17 NOTE — PATIENT INSTRUCTIONS
Medicare Wellness  Personal Prevention Plan of Service     Date of Office Visit:    Encounter Provider:  NARCISA Israel  Place of Service:  [facility]  Patient Name: Airn Lyons  :  1955    As part of the Medicare Wellness portion of your visit today, we are providing you with this personalized preventive plan of services (PPPS). This plan is based upon recommendations of the [place] Preventive Services Task Force (USPSTF) and the Advisory Committee on Immunization Practices (ACIP).    This lists the preventive care services that should be considered, and provides dates of when you are due. Items listed as completed are up-to-date and do not require any further intervention.    Health Maintenance   Topic Date Due    RSV Vaccine - Adults (1 - 1-dose 60+ series) Never done    Pneumococcal Vaccine 65+ (2 of 2 - PCV) 2020    COVID-19 Vaccine ( - -24 season) 2023    PAP SMEAR  2024    ANNUAL WELLNESS VISIT  06/15/2024    LIPID PANEL  06/15/2024    INFLUENZA VACCINE  2024    MAMMOGRAM  2025    DXA SCAN  2025    TDAP/TD VACCINES (2 - Td or Tdap) 2029    COLORECTAL CANCER SCREENING  2029    HEPATITIS C SCREENING  Completed    ZOSTER VACCINE  Completed       Orders Placed This Encounter   Procedures    XR Spine Thoracic 3 View     Order Specific Question:   Reason for Exam:     Answer:   right-sided thoracic pain with hx of scoliosis     Order Specific Question:   Release to patient     Answer:   Routine Release [[id number]]    Urinalysis With Microscopic If Indicated (No Culture) - Urine, Clean Catch     Order Specific Question:   Release to patient     Answer:   Routine Release [[id number]]       Return in about 6 months (around 12/3/2024), or if symptoms worsen or fail to improve, for wellness.

## 2024-06-30 ENCOUNTER — PATIENT MESSAGE (OUTPATIENT)
Dept: INTERNAL MEDICINE | Facility: CLINIC | Age: 69
End: 2024-06-30
Payer: MEDICARE

## 2024-07-05 DIAGNOSIS — M54.6 ACUTE RIGHT-SIDED THORACIC BACK PAIN: ICD-10-CM

## 2024-07-05 DIAGNOSIS — M54.16 LUMBAR RADICULOPATHY: Primary | ICD-10-CM

## 2024-08-05 ENCOUNTER — HOSPITAL ENCOUNTER (OUTPATIENT)
Facility: HOSPITAL | Age: 69
Discharge: HOME | End: 2024-08-05
Payer: MEDICARE

## 2024-08-05 DIAGNOSIS — M54.6 ACUTE RIGHT-SIDED THORACIC BACK PAIN: ICD-10-CM

## 2024-08-05 DIAGNOSIS — M54.16 LUMBAR RADICULOPATHY: ICD-10-CM

## 2024-08-05 PROCEDURE — 72146 MRI CHEST SPINE W/O DYE: CPT

## 2024-08-05 PROCEDURE — 72148 MRI LUMBAR SPINE W/O DYE: CPT

## 2024-08-14 ENCOUNTER — OFFICE VISIT (OUTPATIENT)
Dept: INTERNAL MEDICINE | Facility: CLINIC | Age: 69
End: 2024-08-14
Payer: MEDICARE

## 2024-08-14 VITALS
SYSTOLIC BLOOD PRESSURE: 134 MMHG | HEIGHT: 67 IN | TEMPERATURE: 98.6 F | WEIGHT: 147 LBS | HEART RATE: 89 BPM | DIASTOLIC BLOOD PRESSURE: 90 MMHG | BODY MASS INDEX: 23.07 KG/M2 | OXYGEN SATURATION: 100 %

## 2024-08-14 DIAGNOSIS — M46.96 INFLAMMATORY SPONDYLOPATHY OF LUMBAR REGION: Primary | Chronic | ICD-10-CM

## 2024-08-14 DIAGNOSIS — H81.13 BENIGN PAROXYSMAL POSITIONAL VERTIGO DUE TO BILATERAL VESTIBULAR DISORDER: ICD-10-CM

## 2024-08-14 PROCEDURE — 1126F AMNT PAIN NOTED NONE PRSNT: CPT | Performed by: NURSE PRACTITIONER

## 2024-08-14 PROCEDURE — 1159F MED LIST DOCD IN RCRD: CPT | Performed by: NURSE PRACTITIONER

## 2024-08-14 PROCEDURE — 1160F RVW MEDS BY RX/DR IN RCRD: CPT | Performed by: NURSE PRACTITIONER

## 2024-08-14 PROCEDURE — 99214 OFFICE O/P EST MOD 30 MIN: CPT | Performed by: NURSE PRACTITIONER

## 2024-08-23 ENCOUNTER — TELEPHONE (OUTPATIENT)
Dept: INTERNAL MEDICINE | Facility: CLINIC | Age: 69
End: 2024-08-23
Payer: MEDICARE

## 2024-08-23 DIAGNOSIS — R30.0 DYSURIA: Primary | ICD-10-CM

## 2024-08-23 LAB
BILIRUB BLD-MCNC: NEGATIVE MG/DL
CLARITY, POC: ABNORMAL
COLOR UR: YELLOW
EXPIRATION DATE: ABNORMAL
GLUCOSE UR STRIP-MCNC: NEGATIVE MG/DL
KETONES UR QL: NEGATIVE
LEUKOCYTE EST, POC: ABNORMAL
Lab: ABNORMAL
NITRITE UR-MCNC: NEGATIVE MG/ML
PH UR: 5.5 [PH] (ref 5–8)
PROT UR STRIP-MCNC: NEGATIVE MG/DL
RBC # UR STRIP: ABNORMAL /UL
SP GR UR: 1 (ref 1–1.03)
UROBILINOGEN UR QL: ABNORMAL

## 2024-08-23 PROCEDURE — 81003 URINALYSIS AUTO W/O SCOPE: CPT | Performed by: NURSE PRACTITIONER

## 2024-08-23 NOTE — TELEPHONE ENCOUNTER
Patient can drop off urine per verbal from Marylou.    Patient advised we will need a urine sample and usually we would need to do a office visit but since patient was in the office 8/14 she could just drop it off. Patient requested ABX be called in and was upset that she waited 2 hours for a call back. Advised messages can take up to 48-72 hours per policy. Also advised that we have to test urine in order treat.    Patient scheduled for lab appt to leave urine today.

## 2024-08-23 NOTE — TELEPHONE ENCOUNTER
"Caller: Arin Lyons \"Jess\"    Relationship: Self    Best call back number: [phone]     What medication are you requesting: ANTIBIOTIC    What are your current symptoms: POSSIBLE UTI, PAIN AND BURNING NEAR BLADDER, CLOUDY URINE    How long have you been experiencing symptoms: SINCE WEDNESDAY    If a prescription is needed, what is your preferred pharmacy and phone number:  [facility] [id number] - [place], [place] - [address] AT [address]  - [phone]  - [phone]  [phone]     Additional notes: PATIENT IS NEEDING AN ANTIBIOTIC FOR POSSIBLE UTI, SYMPTOMS ARE GRADUALLY GETTING WORSE AND MORE PAINFUL, PAIN LEVEL AT A 5. WOULD LIKE A CALL WHEN PRESCRIPTION HAS BEEN CALLED IN.       "

## 2024-08-25 LAB
BACTERIA #/AREA URNS HPF: ABNORMAL /HPF
BACTERIA UR CULT: NORMAL
BACTERIA UR CULT: NORMAL
CASTS URNS MICRO: ABNORMAL
EPI CELLS #/AREA URNS HPF: ABNORMAL /HPF
RBC #/AREA URNS HPF: ABNORMAL /HPF
WBC #/AREA URNS HPF: ABNORMAL /HPF

## 2024-09-01 PROBLEM — H81.13 BENIGN PAROXYSMAL POSITIONAL VERTIGO DUE TO BILATERAL VESTIBULAR DISORDER: Chronic | Status: ACTIVE | Noted: 2024-09-01

## 2024-09-01 PROBLEM — H81.13 BENIGN PAROXYSMAL POSITIONAL VERTIGO DUE TO BILATERAL VESTIBULAR DISORDER: Status: ACTIVE | Noted: 2024-09-01

## 2024-09-02 NOTE — PROGRESS NOTES
"Chief Complaint  Back Pain (Review MRI) and Balance Issues (Vertigo episode in early July)  Subjective        Arin Lyons presents to [facility]  Back Pain      History of Present Illness  The patient presents for evaluation of multiple medical concerns.    She has been experiencing vertigo which is affecting her balance. Her first episode occurred in 07/2024, lasting for four days. Sx have improved but she has noticed a persistent imbalance since then. She has been working with PT. Her symptoms are intermittent, with the last couple of days being symptom-free until a minor episode last night. This morning she walked with her daughter using a walking stick but felt as if she was starting to stagger.     She has previously seen chiropractors for her back but disliked the neck manipulation they performed. She consulted Dr. Wynne at [facility], who diagnosed her with BPPV and referred her to physical therapy. She takes vitamin D3 with K daily. Denies chest pain and/or dyspnea.    She continues to experience back pain, particularly in the lumbar region. She describes feeling as if something is protruding when she lies on her back on the floor. . She has previously visited a pain clinic where she discussed treatment options. She declined an epidural injection and instead tried a compounded cream, which provided some relief. She also uses over-the-counter creams and finds that applying a frozen water bottle to the area helps. She occasionally takes ibuprofen for severe pain but tries to avoid regular use. MRI thoracic and lumbar spine 8/5/24 showed advanced degenerative disc disease and mild dextroconvex scoliotic curvature.    Objective   Vital Signs:  /90 (BP Location: Left arm, Patient Position: Sitting, Cuff Size: Adult)   Pulse 89   Temp 98.6 °F (37 °C)   Ht 170.2 cm (67\")   Wt 66.7 kg (147 lb)   SpO2 100%   BMI 23.02 kg/m²   Estimated body mass index is 23.02 kg/m² " "as calculated from the following:    Height as of this encounter: 170.2 cm (67\").    Weight as of this encounter: 66.7 kg (147 lb).    BMI is within normal parameters. No other follow-up for BMI required.      Physical Exam  Constitutional:       Appearance: She is well-developed. She is not ill-appearing.   HENT:      Head: Normocephalic.      Right Ear: Hearing, tympanic membrane and external ear normal.      Left Ear: Hearing, tympanic membrane and external ear normal.      Nose: Nose normal. No nasal deformity, mucosal edema or rhinorrhea.      Right Sinus: No maxillary sinus tenderness or frontal sinus tenderness.      Left Sinus: No maxillary sinus tenderness or frontal sinus tenderness.      Mouth/Throat:      Dentition: Normal dentition.   Eyes:      General: Lids are normal.         Right eye: No discharge.         Left eye: No discharge.      Conjunctiva/sclera: Conjunctivae normal.      Right eye: No exudate.     Left eye: No exudate.  Neck:      Thyroid: No thyroid mass or thyromegaly.      Vascular: No carotid bruit.      Trachea: Trachea normal.   Cardiovascular:      Rate and Rhythm: Regular rhythm.      Pulses: Normal pulses.      Heart sounds: Normal heart sounds. No murmur heard.  Pulmonary:      Effort: No respiratory distress.      Breath sounds: Normal breath sounds. No decreased breath sounds, wheezing, rhonchi or rales.   Abdominal:      General: Bowel sounds are normal.      Palpations: Abdomen is soft.      Tenderness: There is no abdominal tenderness.   Musculoskeletal:      Cervical back: Normal range of motion. No edema.   Lymphadenopathy:      Head:      Right side of head: No submental, submandibular, tonsillar, preauricular, posterior auricular or occipital adenopathy.      Left side of head: No submental, submandibular, tonsillar, preauricular, posterior auricular or occipital adenopathy.   Skin:     General: Skin is warm and dry.      Nails: There is no clubbing.   Neurological:      " Mental Status: She is alert.   Psychiatric:         Behavior: Behavior is cooperative.        Physical Exam      Result Review :  The following data was reviewed by: NARCISA Israel on 08/14/2024:  Common labs          11/3/2023    09:49 3/26/2024    15:47   Common Labs   Glucose 74  88    BUN 20  18    Creatinine 0.74  0.70    Sodium 142  138    Potassium 4.7  4.6    Chloride 103  101    Calcium 9.6  9.4    Total Protein 7.1     Albumin 4.7  4.3    Total Bilirubin 0.4  0.3    Alkaline Phosphatase 80  83    AST (SGOT) 23  23    ALT (SGPT) 14  16    WBC 5.94  5.75    Hemoglobin 13.5  12.6    Hematocrit 40.8  38.2    Platelets 301  287      Data reviewed : Radiologic studies MRI thoracic and lumbar spine 8/4/24      Results  Imaging  Advanced degenerative disc disease throughout the spine. Bulging discs at T4-5, T6-7, and T7-8. Arthritis from T4-5 down to T9-10. Degenerative cyst at L1-2 with L1 pushed over L2 by about 2 to 3 mm. Bulging discs at L2-L3, L3-L4, and L5-S1. Canal stenosis at L2-L3. Moderate to severe hypertrophic change at L5-S1.           Assessment and Plan   Diagnoses and all orders for this visit:    1. Inflammatory spondylopathy of lumbar region (Primary)  Assessment & Plan:  Reviewed MRIs with patient. She was advised against heavy lifting and encouraged to perform stretching exercises. She will continue to monitor her back pain. If the pain becomes intolerable, she may consider pain management options such as epidurals or trigger point injections. She will be referred to pain mgmt for further treatment.      2. Benign paroxysmal positional vertigo due to bilateral vestibular disorder  Assessment & Plan:  She has been experiencing recurrent episodes of vertigo, with the most recent episode occurring in early July and lasting about four days. Despite improvement, she continues to have balance issues. Her audiologist suggested that repositioning maneuvers could be beneficial, which can be  performed by a vestibular rehabilitation specialist. It was also discussed that low vitamin D levels can exacerbate vertigo symptoms, although her vitamin D levels have consistently been within the normal range. She takes vitamin D3 with vitamin K daily. A referral for physical therapy will be provided to assist with her balance issues. If symptoms persist, she may consider seeing Yohana Ashby, a vestibular rehabilitation specialist.    Orders:  -     Ambulatory Referral to Physical Therapy for Evaluation & Treatment      Assessment & Plan           Follow Up   Return if symptoms worsen or fail to improve, for Next scheduled follow up.  Patient was given instructions and counseling regarding her condition or for health maintenance advice. Please see specific information pulled into the AVS if appropriate.     Patient or patient representative verbalized consent for the use of Ambient Listening during the visit with  NARCISA Israel for chart documentation. 9/1/2024  21:11 EDT  Answers submitted by the patient for this visit:  Other (Submitted on 8/10/2024)  Please describe your symptoms.: Going over MRI results  Have you had these symptoms before?: Yes  How long have you been having these symptoms?: Greater than 2 weeks  Primary Reason for Visit (Submitted on 8/10/2024)  What is the primary reason for your visit?: Other

## 2024-09-02 NOTE — ASSESSMENT & PLAN NOTE
She has been experiencing recurrent episodes of vertigo, with the most recent episode occurring in early July and lasting about four days. Despite improvement, she continues to have balance issues. Her audiologist suggested that repositioning maneuvers could be beneficial, which can be performed by a vestibular rehabilitation specialist. It was also discussed that low vitamin D levels can exacerbate vertigo symptoms, although her vitamin D levels have consistently been within the normal range. She takes vitamin D3 with vitamin K daily. A referral for physical therapy will be provided to assist with her balance issues. If symptoms persist, she may consider seeing Yohana Ashby, a vestibular rehabilitation specialist.

## 2024-09-02 NOTE — ASSESSMENT & PLAN NOTE
Reviewed MRIs with patient. She was advised against heavy lifting and encouraged to perform stretching exercises. She will continue to monitor her back pain. If the pain becomes intolerable, she may consider pain management options such as epidurals or trigger point injections. She will be referred to pain mgmt for further treatment.

## 2025-01-24 ENCOUNTER — OFFICE VISIT (OUTPATIENT)
Dept: INTERNAL MEDICINE | Facility: CLINIC | Age: 70
End: 2025-01-24
Payer: MEDICARE

## 2025-01-24 VITALS
DIASTOLIC BLOOD PRESSURE: 78 MMHG | BODY MASS INDEX: 22.44 KG/M2 | HEIGHT: 67 IN | TEMPERATURE: 97.1 F | HEART RATE: 80 BPM | WEIGHT: 143 LBS | OXYGEN SATURATION: 98 % | SYSTOLIC BLOOD PRESSURE: 160 MMHG

## 2025-01-24 DIAGNOSIS — R03.0 ELEVATED BLOOD PRESSURE READING: ICD-10-CM

## 2025-01-24 DIAGNOSIS — M94.0 COSTOCHONDRITIS: ICD-10-CM

## 2025-01-24 DIAGNOSIS — J40 BRONCHITIS: Primary | ICD-10-CM

## 2025-01-24 PROBLEM — R91.1 LUNG NODULE: Status: ACTIVE | Noted: 2025-01-24

## 2025-01-24 PROCEDURE — 1126F AMNT PAIN NOTED NONE PRSNT: CPT

## 2025-01-24 PROCEDURE — 99213 OFFICE O/P EST LOW 20 MIN: CPT

## 2025-01-24 RX ORDER — DEXTROMETHORPHAN HYDROBROMIDE AND PROMETHAZINE HYDROCHLORIDE 15; 6.25 MG/5ML; MG/5ML
5 SYRUP ORAL 4 TIMES DAILY PRN
Qty: 180 ML | Refills: 0 | Status: SHIPPED | OUTPATIENT
Start: 2025-01-24

## 2025-01-24 RX ORDER — AZITHROMYCIN 250 MG/1
TABLET, FILM COATED ORAL
Qty: 6 TABLET | Refills: 0 | Status: SHIPPED | OUTPATIENT
Start: 2025-01-24

## 2025-01-24 RX ORDER — METHYLPREDNISOLONE 4 MG/1
TABLET ORAL
Qty: 21 TABLET | Refills: 0 | Status: SHIPPED | OUTPATIENT
Start: 2025-01-24

## 2025-01-24 NOTE — PROGRESS NOTES
Chief Complaint  Chest Pain (Dx with bronchitis 1/14 at walk in clinic - started 12/2024 with sinus infection and prescribed amoxil for 10 days ) and Cough     Subjective:      History of Present Illness {CC  Problem List  Visit  Diagnosis   Encounters  Notes  Medications  Labs  Result Review Imaging  Media :23}     Arin Lyons presents to [facility] for:    Patient or patient representative verbalized consent for the use of Ambient Listening during the visit with  NARCISA Courtney for chart documentation. 2/10/2025  10:05 EST     History of Present Illness      The patient presents for evaluation of bronchitis.    She sought medical attention from Dr. Erickson at an urgent care facility, where she was prescribed medication that she used for a couple of days, which appeared to alleviate her symptoms. Her cough has improved, but she still experiences occasional croupy or dry coughs. She reports intermittent chest discomfort, which was particularly noticeable last night and disrupted her sleep. She occasionally experiences wheezing when lying down and sleeps in an upright position using three pillows. Her sputum is productive yellow or green. she reports no hemoptysis. She has not experienced any fevers or chills. She has previously taken Mucinex 600 mg but is currently not on any medication. She has also taken Mucinex D in the past. She has a history of using steroid packs without any adverse effects. She declined a steroid prescription during a previous visit to a clinic in [place] for a sinus infection. She describes her chest pain as discomfort rather than sharp pain, which is more pronounced on the left side. She reports increased coughing at night and in the morning. She has been maintaining her workout routine and walking around her house. She has a wrist blood pressure monitor at home. She uses fluticasone nasal spray daily for  allergies.    MEDICATIONS  Current: Fluticasone nasal spray.  Past: Mucinex, Mucinex D.         I have reviewed patient's medical history, any new submitted information provided by patient or medical assistant and updated medical record.      Objective:      Physical Exam  Vitals reviewed.   Constitutional:       General: She is awake. She is not in acute distress.     Appearance: Normal appearance. She is not ill-appearing, toxic-appearing or diaphoretic.   HENT:      Head: Normocephalic.      Right Ear: Hearing normal.      Left Ear: Hearing normal.      Nose: Nose normal. Congestion and rhinorrhea present. Rhinorrhea is purulent.      Mouth/Throat:      Lips: Pink.      Mouth: Mucous membranes are moist.   Eyes:      General: Lids are normal. Vision grossly intact. Allergic shiner present.      Conjunctiva/sclera: Conjunctivae normal.   Cardiovascular:      Rate and Rhythm: Normal rate and regular rhythm.      Pulses: Normal pulses.      Heart sounds: Normal heart sounds.   Pulmonary:      Effort: Pulmonary effort is normal.      Breath sounds: No stridor. Examination of the right-lower field reveals decreased breath sounds. Examination of the left-lower field reveals decreased breath sounds. Decreased breath sounds present. No wheezing, rhonchi or rales.   Abdominal:      General: Bowel sounds are normal.   Musculoskeletal:      Cervical back: Full passive range of motion without pain.      Right lower leg: No edema.      Left lower leg: No edema.   Lymphadenopathy:      Head:      Right side of head: No submental, submandibular or tonsillar adenopathy.      Left side of head: No submental, submandibular or tonsillar adenopathy.      Cervical: No cervical adenopathy.   Skin:     General: Skin is warm and dry.      Capillary Refill: Capillary refill takes less than 2 seconds.   Neurological:      General: No focal deficit present.      Mental Status: She is alert and oriented to person, place, and time. Mental  "status is at baseline.   Psychiatric:         Attention and Perception: Attention and perception normal.         Mood and Affect: Mood and affect normal.         Speech: Speech normal.         Behavior: Behavior normal. Behavior is cooperative.         Cognition and Memory: Cognition and memory normal.         Judgment: Judgment normal.        Result Review  Data Reviewed:{ Labs  Result Review  Imaging  Med Tab  Media :23}     Results                  [facility] with Allan Erickson MD (01/14/2025)   XR Chest 2 View (01/14/2025 6:07 PM)       Vital Signs:   /78 (BP Location: Left arm, Patient Position: Sitting, Cuff Size: Adult)   Pulse 80   Temp 97.1 °F (36.2 °C) (Temporal)   Ht 170.2 cm (67.01\")   Wt 64.9 kg (143 lb)   SpO2 98%   BMI 22.39 kg/m²     BMI is within normal parameters. No other follow-up for BMI required.             Requested Prescriptions     Signed Prescriptions Disp Refills    methylPREDNISolone (MEDROL) 4 MG dose pack 21 tablet 0     Sig: Take as directed on package instructions.    azithromycin (ZITHROMAX) 250 MG tablet 6 tablet 0     Sig: Take 2 tablets the first day, then 1 tablet daily for 4 days.    promethazine-dextromethorphan (PROMETHAZINE-DM) 6.25-15 MG/5ML syrup 180 mL 0     Sig: Take 5 mL by mouth 4 (Four) Times a Day As Needed for Cough.       Routine medications provided by this office will also be refilled via pharmacy request.       Current Outpatient Medications:     benzonatate (TESSALON) 100 MG capsule, Take 1 capsule by mouth 3 (Three) Times a Day As Needed for Cough., Disp: 30 capsule, Rfl: 0    naproxen sodium (ALEVE) 220 MG tablet, Take 1 tablet by mouth 2 (Two) Times a Day As Needed for Mild Pain. Or Advil rotation as needed, Disp: , Rfl:     Vitamin D-Vitamin K (VITAMIN K2-VITAMIN D3 PO), Take 1 tablet by mouth Daily. 1000 IU/ 45 mcg, Disp: , Rfl:     azithromycin (ZITHROMAX) 250 MG tablet, Take 2 tablets the first day, then 1 tablet daily for 4 days., Disp: 6 " tablet, Rfl: 0    methylPREDNISolone (MEDROL) 4 MG dose pack, Take as directed on package instructions., Disp: 21 tablet, Rfl: 0    promethazine-dextromethorphan (PROMETHAZINE-DM) 6.25-15 MG/5ML syrup, Take 5 mL by mouth 4 (Four) Times a Day As Needed for Cough., Disp: 180 mL, Rfl: 0     Assessment and Plan:      Assessment and Plan {CC Problem List  Visit Diagnosis  ROS  Review (Popup)  Health Maintenance  Quality  BestPractice  Medications  SmartSets  SnapShot Encounters  Media :23}     Problem List Items Addressed This Visit    None  Visit Diagnoses       Bronchitis    -  Primary    Relevant Medications    methylPREDNISolone (MEDROL) 4 MG dose pack    azithromycin (ZITHROMAX) 250 MG tablet    promethazine-dextromethorphan (PROMETHAZINE-DM) 6.25-15 MG/5ML syrup    Costochondritis        Elevated blood pressure reading        monitor your blood pressure at home, if remain elevated please follow up with PCP                 1. Bronchitis.  Her blood pressure is elevated today. She reports that her cough has improved but still experiences occasional croupy and dry coughs. She has been using fluticasone nasal spray daily and has taken Mucinex 600 mg in the past. She has a history of taking steroid packs without issues. A prescription for a 6-day course of steroids and a Z-Jovani [facility]. She is advised to take the steroids in their entirety and to be aware of potential side effects such as irritability and increased appetite. A cough medicine containing dextromethorphan will be prescribed for nighttime use to aid sleep. She is advised to continue using regular Mucinex (guaifenesin) and to avoid Mucinex D due to its potential to raise blood pressure. She is also advised to monitor her blood pressure at home and to return for a follow-up visit if it remains above 140/90.       Please review added information under the Patient Instructions portion of your print out.    Thank you for allowing us to  care for you,  NARCISA Courtney      Follow Up {Instructions Charge Capture  Follow-up Communications :23}     Return if symptoms worsen or fail to improve.      Patient was given instructions and counseling regarding her condition or for health maintenance advice. Please see specific information pulled into the AVS if appropriate.        Dragon disclaimer:   Much of this encounter note is an electronic transcription/translation of spoken language to printed text. The electronic translation of spoken language may permit erroneous, or at times, nonsensical words or phrases to be inadvertently transcribed; Although I have reviewed the note for such errors, some may still exist.     Additional Patient Counseling:       Patient Instructions     Acute Bronchitis, Adult    Acute bronchitis is when air tubes in the lungs (bronchi) suddenly get swollen. The condition can make it hard for you to breathe. In adults, acute bronchitis usually goes away within 2 weeks. A cough caused by bronchitis may last up to 3 weeks. Smoking, allergies, and asthma can make the condition worse.  What are the causes?  Germs that cause cold and flu (viruses). The most common cause of this condition is the virus that causes the common cold.  Bacteria.  Substances that bother (irritate) the lungs, including:  Smoke from cigarettes and other types of tobacco.  Dust and pollen.  Fumes from chemicals, gases, or burned fuel.  Indoor or outdoor air pollution.  What increases the risk?  A weak body's defense system. This is also called the immune system.  Any condition that affects your lungs and breathing, such as asthma.  What are the signs or symptoms?  A cough.  Coughing up clear, yellow, or green mucus.  Making high-pitched whistling sounds when you breathe, most often when you breathe out (wheezing).  Runny or stuffy nose.  Having too much mucus in your lungs (chest congestion).  Shortness of breath.  Body aches.  A sore throat.  How is  this treated?  Acute bronchitis may go away over time without treatment. Your doctor may tell you to:  Drink more fluids. This will help thin your mucus so it is easier to cough up.  Use a device that gets medicine into your lungs (inhaler).  Use a vaporizer or a humidifier. These are machines that add water to the air. This helps with coughing and poor breathing.  Take a medicine that thins mucus and helps clear it from your lungs.  Take a medicine that prevents or stops coughing.  It is not common to take an antibiotic medicine for this condition.  Follow these instructions at home:    Take over-the-counter and prescription medicines only as told by your doctor.  Use an inhaler, vaporizer, or humidifier as told by your doctor.  Take two teaspoons (10 mL) of honey at bedtime. This helps lessen your coughing at night.  Drink enough fluid to keep your pee (urine) pale yellow.  Do not smoke or use any products that contain nicotine or tobacco. If you need help quitting, ask your doctor.  Get a lot of rest.  Return to your normal activities when your doctor says that it is safe.  Keep all follow-up visits.  How is this prevented?    Wash your hands often with soap and water for at least 20 seconds. If you cannot use soap and water, use hand .  Avoid contact with people who have cold symptoms.  Try not to touch your mouth, nose, or eyes with your hands.  Avoid breathing in smoke or chemical fumes.  Make sure to get the flu shot every year.  Contact a doctor if:  Your symptoms do not get better in 2 weeks.  You have trouble coughing up the mucus.  Your cough keeps you awake at night.  You have a fever.  Get help right away if:  You cough up blood.  You have chest pain.  You have very bad shortness of breath.  You faint or keep feeling like you are going to faint.  You have a very bad headache.  Your fever or chills get worse.  These symptoms may be an emergency. Get help right away. Call your local emergency  services (911 in the [place]  Do not wait to see if the symptoms will go away.  Do not drive yourself to the hospital.  Summary  Acute bronchitis is when air tubes in the lungs (bronchi) suddenly get swollen. In adults, acute bronchitis usually goes away within 2 weeks.  Drink more fluids. This will help thin your mucus so it is easier to cough up.  Take over-the-counter and prescription medicines only as told by your doctor.  Contact a doctor if your symptoms do not improve after 2 weeks of treatment.  This information is not intended to replace advice given to you by your health care provider. Make sure you discuss any questions you have with your health care provider.  Document Revised: 04/20/2022 Document Reviewed: 04/20/2022  Elsevier Patient Education © 2024 [employer]

## 2025-02-10 NOTE — PATIENT INSTRUCTIONS
Acute Bronchitis, Adult    Acute bronchitis is when air tubes in the lungs (bronchi) suddenly get swollen. The condition can make it hard for you to breathe. In adults, acute bronchitis usually goes away within 2 weeks. A cough caused by bronchitis may last up to 3 weeks. Smoking, allergies, and asthma can make the condition worse.  What are the causes?  Germs that cause cold and flu (viruses). The most common cause of this condition is the virus that causes the common cold.  Bacteria.  Substances that bother (irritate) the lungs, including:  Smoke from cigarettes and other types of tobacco.  Dust and pollen.  Fumes from chemicals, gases, or burned fuel.  Indoor or outdoor air pollution.  What increases the risk?  A weak body's defense system. This is also called the immune system.  Any condition that affects your lungs and breathing, such as asthma.  What are the signs or symptoms?  A cough.  Coughing up clear, yellow, or green mucus.  Making high-pitched whistling sounds when you breathe, most often when you breathe out (wheezing).  Runny or stuffy nose.  Having too much mucus in your lungs (chest congestion).  Shortness of breath.  Body aches.  A sore throat.  How is this treated?  Acute bronchitis may go away over time without treatment. Your doctor may tell you to:  Drink more fluids. This will help thin your mucus so it is easier to cough up.  Use a device that gets medicine into your lungs (inhaler).  Use a vaporizer or a humidifier. These are machines that add water to the air. This helps with coughing and poor breathing.  Take a medicine that thins mucus and helps clear it from your lungs.  Take a medicine that prevents or stops coughing.  It is not common to take an antibiotic medicine for this condition.  Follow these instructions at home:    Take over-the-counter and prescription medicines only as told by your doctor.  Use an inhaler, vaporizer, or humidifier as told by your doctor.  Take two teaspoons  (10 mL) of honey at bedtime. This helps lessen your coughing at night.  Drink enough fluid to keep your pee (urine) pale yellow.  Do not smoke or use any products that contain nicotine or tobacco. If you need help quitting, ask your doctor.  Get a lot of rest.  Return to your normal activities when your doctor says that it is safe.  Keep all follow-up visits.  How is this prevented?    Wash your hands often with soap and water for at least 20 seconds. If you cannot use soap and water, use hand .  Avoid contact with people who have cold symptoms.  Try not to touch your mouth, nose, or eyes with your hands.  Avoid breathing in smoke or chemical fumes.  Make sure to get the flu shot every year.  Contact a doctor if:  Your symptoms do not get better in 2 weeks.  You have trouble coughing up the mucus.  Your cough keeps you awake at night.  You have a fever.  Get help right away if:  You cough up blood.  You have chest pain.  You have very bad shortness of breath.  You faint or keep feeling like you are going to faint.  You have a very bad headache.  Your fever or chills get worse.  These symptoms may be an emergency. Get help right away. Call your local emergency services (911 in the [place]  Do not wait to see if the symptoms will go away.  Do not drive yourself to the hospital.  Summary  Acute bronchitis is when air tubes in the lungs (bronchi) suddenly get swollen. In adults, acute bronchitis usually goes away within 2 weeks.  Drink more fluids. This will help thin your mucus so it is easier to cough up.  Take over-the-counter and prescription medicines only as told by your doctor.  Contact a doctor if your symptoms do not improve after 2 weeks of treatment.  This information is not intended to replace advice given to you by your health care provider. Make sure you discuss any questions you have with your health care provider.  Document Revised: 04/20/2022 Document Reviewed: 04/20/2022  [employer] [facility]  Education © 2024 [employer]

## 2025-05-29 ENCOUNTER — HOSPITAL ENCOUNTER (EMERGENCY)
Facility: HOSPITAL | Age: 70
Discharge: HOME | End: 2025-05-29
Attending: STUDENT IN AN ORGANIZED HEALTH CARE EDUCATION/TRAINING PROGRAM
Payer: MEDICARE

## 2025-05-29 ENCOUNTER — APPOINTMENT (OUTPATIENT)
Dept: CT IMAGING | Facility: HOSPITAL | Age: 70
End: 2025-05-29
Payer: MEDICARE

## 2025-05-29 VITALS
TEMPERATURE: 98 F | OXYGEN SATURATION: 96 % | HEART RATE: 72 BPM | SYSTOLIC BLOOD PRESSURE: 151 MMHG | HEIGHT: 67 IN | WEIGHT: 140 LBS | BODY MASS INDEX: 21.97 KG/M2 | DIASTOLIC BLOOD PRESSURE: 83 MMHG | RESPIRATION RATE: 16 BRPM

## 2025-05-29 DIAGNOSIS — R10.32 LEFT INGUINAL PAIN: Primary | ICD-10-CM

## 2025-05-29 LAB
ALBUMIN SERPL-MCNC: 4.6 G/DL (ref 3.5–5.2)
ALBUMIN/GLOB SERPL: 1.5 G/DL
ALP SERPL-CCNC: 89 U/L (ref 39–117)
ALT SERPL W P-5'-P-CCNC: 14 U/L (ref 1–33)
ANION GAP SERPL CALCULATED.3IONS-SCNC: 12 MMOL/L (ref 5–15)
AST SERPL-CCNC: 23 U/L (ref 1–32)
BASOPHILS # BLD AUTO: 0.07 10*3/MM3 (ref 0–0.2)
BASOPHILS NFR BLD AUTO: 1.4 % (ref 0–1.5)
BILIRUB SERPL-MCNC: 0.5 MG/DL (ref 0–1.2)
BILIRUB UR QL STRIP: NEGATIVE
BUN SERPL-MCNC: 19 MG/DL (ref 8–23)
BUN/CREAT SERPL: 24.4 (ref 7–25)
CALCIUM SPEC-SCNC: 9.4 MG/DL (ref 8.6–10.5)
CHLORIDE SERPL-SCNC: 99 MMOL/L (ref 98–107)
CLARITY UR: CLEAR
CO2 SERPL-SCNC: 25 MMOL/L (ref 22–29)
COLOR UR: YELLOW
CREAT SERPL-MCNC: 0.78 MG/DL (ref 0.57–1)
DEPRECATED RDW RBC AUTO: 43.2 FL (ref 37–54)
EGFRCR SERPLBLD CKD-EPI 2021: 80.8 ML/MIN/1.73
EOSINOPHIL # BLD AUTO: 0.28 10*3/MM3 (ref 0–0.4)
EOSINOPHIL NFR BLD AUTO: 5.4 % (ref 0.3–6.2)
ERYTHROCYTE [DISTWIDTH] IN BLOOD BY AUTOMATED COUNT: 12.3 % (ref 12.3–15.4)
GLOBULIN UR ELPH-MCNC: 3.1 GM/DL
GLUCOSE SERPL-MCNC: 92 MG/DL (ref 65–99)
GLUCOSE UR STRIP-MCNC: NEGATIVE MG/DL
HCT VFR BLD AUTO: 42 % (ref 34–46.6)
HGB BLD-MCNC: 13.4 G/DL (ref 12–15.9)
HGB UR QL STRIP.AUTO: NEGATIVE
IMM GRANULOCYTES # BLD AUTO: 0.01 10*3/MM3 (ref 0–0.05)
IMM GRANULOCYTES NFR BLD AUTO: 0.2 % (ref 0–0.5)
KETONES UR QL STRIP: NEGATIVE
LEUKOCYTE ESTERASE UR QL STRIP.AUTO: NEGATIVE
LIPASE SERPL-CCNC: 59 U/L (ref 13–60)
LYMPHOCYTES # BLD AUTO: 2.24 10*3/MM3 (ref 0.7–3.1)
LYMPHOCYTES NFR BLD AUTO: 43.6 % (ref 19.6–45.3)
MCH RBC QN AUTO: 30.4 PG (ref 26.6–33)
MCHC RBC AUTO-ENTMCNC: 31.9 G/DL (ref 31.5–35.7)
MCV RBC AUTO: 95.2 FL (ref 79–97)
MONOCYTES # BLD AUTO: 0.5 10*3/MM3 (ref 0.1–0.9)
MONOCYTES NFR BLD AUTO: 9.7 % (ref 5–12)
NEUTROPHILS NFR BLD AUTO: 2.04 10*3/MM3 (ref 1.7–7)
NEUTROPHILS NFR BLD AUTO: 39.7 % (ref 42.7–76)
NITRITE UR QL STRIP: NEGATIVE
NRBC BLD AUTO-RTO: 0 /100 WBC (ref 0–0.2)
PH UR STRIP.AUTO: 6 [PH] (ref 5–8)
PLATELET # BLD AUTO: 283 10*3/MM3 (ref 140–450)
PMV BLD AUTO: 9.9 FL (ref 6–12)
POTASSIUM SERPL-SCNC: 3.6 MMOL/L (ref 3.5–5.2)
PROT SERPL-MCNC: 7.7 G/DL (ref 6–8.5)
PROT UR QL STRIP: NEGATIVE
RBC # BLD AUTO: 4.41 10*6/MM3 (ref 3.77–5.28)
SODIUM SERPL-SCNC: 136 MMOL/L (ref 136–145)
SP GR UR STRIP: 1.01 (ref 1–1.03)
UROBILINOGEN UR QL STRIP: NORMAL
WBC NRBC COR # BLD AUTO: 5.14 10*3/MM3 (ref 3.4–10.8)

## 2025-05-29 PROCEDURE — 99285 EMERGENCY DEPT VISIT HI MDM: CPT

## 2025-05-29 PROCEDURE — 74177 CT ABD & PELVIS W/CONTRAST: CPT

## 2025-05-29 PROCEDURE — 25010000002 KETOROLAC TROMETHAMINE PER 15 MG: Performed by: PHYSICIAN ASSISTANT

## 2025-05-29 PROCEDURE — 96374 THER/PROPH/DIAG INJ IV PUSH: CPT

## 2025-05-29 PROCEDURE — 80053 COMPREHEN METABOLIC PANEL: CPT | Performed by: PHYSICIAN ASSISTANT

## 2025-05-29 PROCEDURE — 81003 URINALYSIS AUTO W/O SCOPE: CPT | Performed by: PHYSICIAN ASSISTANT

## 2025-05-29 PROCEDURE — 83690 ASSAY OF LIPASE: CPT | Performed by: PHYSICIAN ASSISTANT

## 2025-05-29 PROCEDURE — 85025 COMPLETE CBC W/AUTO DIFF WBC: CPT | Performed by: PHYSICIAN ASSISTANT

## 2025-05-29 PROCEDURE — 25510000001 IOPAMIDOL 61 % SOLUTION: Performed by: STUDENT IN AN ORGANIZED HEALTH CARE EDUCATION/TRAINING PROGRAM

## 2025-05-29 RX ORDER — SODIUM CHLORIDE 0.9 % (FLUSH) 0.9 %
10 SYRINGE (ML) INJECTION AS NEEDED
Status: DISCONTINUED | OUTPATIENT
Start: 2025-05-29 | End: 2025-05-29 | Stop reason: HOSPADM

## 2025-05-29 RX ORDER — KETOROLAC TROMETHAMINE 15 MG/ML
15 INJECTION, SOLUTION INTRAMUSCULAR; INTRAVENOUS ONCE
Status: COMPLETED | OUTPATIENT
Start: 2025-05-29 | End: 2025-05-29

## 2025-05-29 RX ORDER — IOPAMIDOL 612 MG/ML
85 INJECTION, SOLUTION INTRAVASCULAR
Status: COMPLETED | OUTPATIENT
Start: 2025-05-29 | End: 2025-05-29

## 2025-05-29 RX ADMIN — KETOROLAC TROMETHAMINE 15 MG: 15 INJECTION INTRAMUSCULAR at 18:33

## 2025-05-29 RX ADMIN — IOPAMIDOL 85 ML: 612 INJECTION, SOLUTION INTRAVENOUS at 18:16

## 2025-05-29 NOTE — ED PROVIDER NOTES
EMERGENCY DEPARTMENT ENCOUNTER    Room Number:  03/03  Date of encounter:  5/29/2025  PCP: Marylou Hughes APRN  Historian: Patient, daughter  Chronic or social conditions impacting care (social determinants of health): None    HPI:  Chief Complaint: Left groin pain  A complete HPI/ROS/PMH/PSH/SH/FH are unobtainable due to: Nothing    Context: Arin Lyons is a 70 y.o. female with a history of osteopenia, who presents to the ED c/o acute left groin pain.  Patient states her symptoms started 2 days ago and seem to be intermittent.  The pain improved yesterday.  The pain became significantly worse this afternoon.  The pain is localized to the left groin area with radiation to the perineal area.  She denies any fever, diarrhea, dysuria.  Pain is worse with movement.  She denies any recent falls.    Review of prior external notes (non-ED):   I reviewed last internal medicine office visit dated 1/24/2025.  Patient being followed for bronchitis.    Review of prior external test results outside of this encounter:  I reviewed a CMP from 3/26/2024.  Creatinine 0.70, potassium 4.6    PAST MEDICAL HISTORY  Active Ambulatory Problems     Diagnosis Date Noted    Carotid stenosis, asymptomatic, bilateral 08/20/2019    Arthritis of foot 09/24/2020    Closed nondisplaced fracture of second metatarsal bone of right foot 09/24/2020    Closed nondisplaced fracture of third metatarsal bone of right foot 09/24/2020    Vitamin D deficiency 09/24/2020    Lumbar radiculopathy 11/28/2021    Neck pain on left side 11/28/2021    Vertigo 11/28/2021    Degenerative disc disease, lumbar 12/12/2021    TIA (transient ischemic attack) 04/10/2022    Allergic sinusitis 05/23/2022    Thyroid nodule 05/23/2022    Dysequilibrium 05/23/2022    Anxiety 09/25/2022    Hypercholesteremia 06/17/2023    Inflammatory spondylopathy of lumbar region 06/17/2023    Osteopenia of left hip 11/05/2023    Scoliosis of lumbar spine 11/05/2023    Benign paroxysmal  positional vertigo due to bilateral vestibular disorder 2024    Lung nodule 2025     Resolved Ambulatory Problems     Diagnosis Date Noted    Osteoporosis without current pathological fracture 2021     Past Medical History:   Diagnosis Date    Arthritis ongoing    Cancer     History of medical problems     Low back pain ongoing    Osteopenia 2021    Scoliosis 2021         PAST SURGICAL HISTORY  Past Surgical History:   Procedure Laterality Date    COLONOSCOPY  2019    SKIN CANCER EXCISION Right 1990         FAMILY HISTORY  Family History   Problem Relation Age of Onset    Hypertension Mother     Depression Mother     Hyperlipidemia Mother     Kidney cancer Mother     Cancer Mother         kidney cancer    Stroke Father     Autoimmune disease Sister     Alzheimer's disease Sister     Heart failure Maternal Grandmother     Thyroid disease Maternal Grandmother     Rheum arthritis Maternal Aunt     Arthritis Maternal Aunt         Rheumatoid         SOCIAL HISTORY  Social History     Socioeconomic History    Marital status:    Tobacco Use    Smoking status: Former     Current packs/day: 0.00     Average packs/day: 0.3 packs/day for 2.0 years (0.5 ttl pk-yrs)     Types: Cigarettes     Start date:      Quit date:      Years since quittin.4     Passive exposure: Past    Smokeless tobacco: Never    Tobacco comments:     smoked when I was an irresponsible young adult many years ago!   Vaping Use    Vaping status: Never Used   Substance and Sexual Activity    Alcohol use: Yes     Alcohol/week: 2.0 standard drinks of alcohol     Types: 2 Glasses of wine per week     Comment: social drinks    Drug use: No    Sexual activity: Yes     Partners: Male     Birth control/protection: Post-menopausal         ALLERGIES  Patient has no known allergies.        REVIEW OF SYSTEMS  All systems reviewed and negative except for those discussed in HPI.       PHYSICAL EXAM    I have reviewed the  triage vital signs and nursing notes.    ED Triage Vitals   Temp Heart Rate Resp BP SpO2   05/29/25 1713 05/29/25 1713 05/29/25 1713 05/29/25 1715 05/29/25 1713   98 °F (36.7 °C) 97 16 (!) 183/97 100 %      Temp src Heart Rate Source Patient Position BP Location FiO2 (%)   05/29/25 1713 05/29/25 1713 05/29/25 1713 05/29/25 1713 --   Oral Monitor Sitting Right arm        Physical Exam  GENERAL: Alert, oriented, not distressed  HENT: head atraumatic, no nuchal rigidity  EYES: no scleral icterus, EOMI  CV: regular rhythm, regular rate, no murmur  RESPIRATORY: normal effort, CTA  ABDOMEN: soft, mild diffuse left lower pelvic tenderness.  No significant swelling, masses, erythema, skin breakdown.  Perineal area appears normal.  MUSCULOSKELETAL: no deformity, FROM, no calf swelling or tenderness  NEURO: alert, moves all extremities, follows commands  SKIN: warm, dry        LAB RESULTS  Recent Results (from the past 24 hours)   Urinalysis With Microscopic If Indicated (No Culture) - Urine, Clean Catch    Collection Time: 05/29/25  5:31 PM    Specimen: Urine, Clean Catch   Result Value Ref Range    Color, UA Yellow Yellow, Straw    Appearance, UA Clear Clear    pH, UA 6.0 5.0 - 8.0    Specific Gravity, UA 1.015 1.005 - 1.030    Glucose, UA Negative Negative    Ketones, UA Negative Negative    Bilirubin, UA Negative Negative    Blood, UA Negative Negative    Protein, UA Negative Negative    Leuk Esterase, UA Negative Negative    Nitrite, UA Negative Negative    Urobilinogen, UA 1.0 E.U./dL 0.2 - 1.0 E.U./dL   Comprehensive Metabolic Panel    Collection Time: 05/29/25  5:34 PM    Specimen: Blood   Result Value Ref Range    Glucose 92 65 - 99 mg/dL    BUN 19.0 8.0 - 23.0 mg/dL    Creatinine 0.78 0.57 - 1.00 mg/dL    Sodium 136 136 - 145 mmol/L    Potassium 3.6 3.5 - 5.2 mmol/L    Chloride 99 98 - 107 mmol/L    CO2 25.0 22.0 - 29.0 mmol/L    Calcium 9.4 8.6 - 10.5 mg/dL    Total Protein 7.7 6.0 - 8.5 g/dL    Albumin 4.6 3.5 -  5.2 g/dL    ALT (SGPT) 14 1 - 33 U/L    AST (SGOT) 23 1 - 32 U/L    Alkaline Phosphatase 89 39 - 117 U/L    Total Bilirubin 0.5 0.0 - 1.2 mg/dL    Globulin 3.1 gm/dL    A/G Ratio 1.5 g/dL    BUN/Creatinine Ratio 24.4 7.0 - 25.0    Anion Gap 12.0 5.0 - 15.0 mmol/L    eGFR 80.8 >60.0 mL/min/1.73   Lipase    Collection Time: 05/29/25  5:34 PM    Specimen: Blood   Result Value Ref Range    Lipase 59 13 - 60 U/L   CBC Auto Differential    Collection Time: 05/29/25  5:34 PM    Specimen: Blood   Result Value Ref Range    WBC 5.14 3.40 - 10.80 10*3/mm3    RBC 4.41 3.77 - 5.28 10*6/mm3    Hemoglobin 13.4 12.0 - 15.9 g/dL    Hematocrit 42.0 34.0 - 46.6 %    MCV 95.2 79.0 - 97.0 fL    MCH 30.4 26.6 - 33.0 pg    MCHC 31.9 31.5 - 35.7 g/dL    RDW 12.3 12.3 - 15.4 %    RDW-SD 43.2 37.0 - 54.0 fl    MPV 9.9 6.0 - 12.0 fL    Platelets 283 140 - 450 10*3/mm3    Neutrophil % 39.7 (L) 42.7 - 76.0 %    Lymphocyte % 43.6 19.6 - 45.3 %    Monocyte % 9.7 5.0 - 12.0 %    Eosinophil % 5.4 0.3 - 6.2 %    Basophil % 1.4 0.0 - 1.5 %    Immature Grans % 0.2 0.0 - 0.5 %    Neutrophils, Absolute 2.04 1.70 - 7.00 10*3/mm3    Lymphocytes, Absolute 2.24 0.70 - 3.10 10*3/mm3    Monocytes, Absolute 0.50 0.10 - 0.90 10*3/mm3    Eosinophils, Absolute 0.28 0.00 - 0.40 10*3/mm3    Basophils, Absolute 0.07 0.00 - 0.20 10*3/mm3    Immature Grans, Absolute 0.01 0.00 - 0.05 10*3/mm3    nRBC 0.0 0.0 - 0.2 /100 WBC       Ordered the above labs and independently reviewed the results.        RADIOLOGY  CT Abdomen Pelvis With Contrast  Result Date: 5/29/2025  CT ABDOMEN PELVIS W CONTRAST-  DATE OF EXAM: 5/29/2025 6:06 PM  INDICATION: Left groin pain.  COMPARISON: Chest radiographs 1/14/2025. MRI lumbar spine 8/5/2024.  TECHNIQUE: Multiple contiguous axial images were acquired through the abdomen and pelvis following intravenous administration of 85 mL of Isovue-300. Reformatted coronal and sagittal sequences were also reviewed. Radiation dose reduction techniques  were utilized, including automated exposure control and exposure modulation based on body size.  FINDINGS: Partially imaged bibasilar dependent atelectasis.  Nonspecific intrahepatic and extrahepatic biliary dilatation with the common duct measuring up to 9 mm in diameter and abruptly narrows at the ampulla the liver and gallbladder are otherwise unremarkable. The spleen, pancreas, and adrenal glands are unremarkable. Mild chronic bilateral pelviectasis. Both kidneys are otherwise unremarkable. The urinary bladder is nondistended. Mild urinary bladder wall thickening is likely accentuated by under distention. Partially calcified uterine fibroid. The adnexa are unremarkable in CT appearance.  Mild diffuse gastric wall thickening is likely accentuated by under distention. Mild to moderate colonic stool. Colonic diverticula, without CT evidence of diverticulitis. No bowel obstruction. The appendix is normal.  No free fluid in the abdomen or pelvis. No free intraperitoneal air. No pathologically enlarged lymph nodes in the abdomen or pelvis. Mild calcified atherosclerotic disease in the abdominal aorta and its distal branches without aneurysm. Diffuse osteopenia. Similar-appearing moderate rotary lower lumbar levoscoliosis and multilevel lumbar spondylosis and spondylolisthesis. No acute osseous abnormality or concerning osseous lesion.       1. Nonspecific intrahepatic and extrahepatic biliary duct dilatation with the common duct abruptly narrowing at the ampulla. Correlate with laboratory values. Could consider further evaluation with routine MRCP or ERCP if clinically indicated. 2. Otherwise, no acute abnormality in the abdomen or pelvis. No specific CT correlate for the concern of left groin pain.  This report was finalized on 5/29/2025 7:36 PM by Albert Roblero MD on Workstation: [id number]        I ordered the above noted radiological studies. Reviewed by me and discussed with radiologist.  See dictation for  official radiology interpretation.      MEDICATIONS GIVEN IN ER    Medications   sodium chloride 0.9 % flush 10 mL (has no administration in time range)   ketorolac (TORADOL) injection 15 mg (15 mg Intravenous Given 5/29/25 1833)   iopamidol (ISOVUE-300) 61 % injection 85 mL (85 mL Intravenous Given by Other 5/29/25 1816)         ADDITIONAL ORDERS CONSIDERED BUT NOT ORDERED:  Admission was considered but after careful review of the patient's presentation, physical examination, diagnostic results, and response to treatment the patient may be safely discharged with outpatient follow-up.       PROGRESS, DATA ANALYSIS, CONSULTS, AND MEDICAL DECISION MAKING    All labs have been independently interpreted by myself.  All radiology studies have been independently interpreted by myself and discussed with radiologist dictating the report.   EKGs independently interpreted by myself.  Discussion below represents my analysis of pertinent findings related to patient's condition, differential diagnosis, treatment plan and final disposition.    I have discussed case with Dr. Junior, emergency room physician.  He has performed his own bedside examination and agrees with treatment plan.    ED Course as of 05/29/25 2046   u May 29, 2025   1726 Patient presents with a 3-day history of worsening left groin pain.  Patient denies any overt dysuria, vomiting, diarrhea, fever.  No falls.  Differential diagnoses include but not limited to muscular strain, UTI, diverticulitis, ovarian mass. [EE]   1801 WBC: 5.14 [EE]   1801 Hemoglobin: 13.4 [EE]   1801 Leukocytes, UA: Negative [EE]   1801 Nitrite, UA: Negative [EE]   1920 CT imaging of the abdomen independently interpreted myself shows no evidence of acute obstruction [EE]   1951 Updated patient and family at workup.  Reassuring CT scan, labs.  No clear etiology to her pain.  She does have an appointment next week to see her primary care doctor.  She understands to return here for any  worsening symptoms. [EE]      ED Course User Index  [EE] Felipe Ovalle PA       AS OF 20:46 EDT VITALS:    BP - 151/83  HR - 72  TEMP - 98 °F (36.7 °C) (Oral)  O2 SATS - 96%        DIAGNOSIS  Final diagnoses:   Left inguinal pain         DISPOSITION  Discharged    Admission was considered but after careful review of the patient's presentation, physical examination, diagnostic results, and response to treatment the patient may be safely discharged with outpatient follow-up.         Dictated utilizing Dragon dictation     Felipe Ovalle PA  05/29/25 2046

## 2025-05-29 NOTE — ED PROVIDER NOTES
EMERGENCY DEPARTMENT MD ATTESTATION NOTE    Room Number:  03/03  PCP: Marylou Hughes APRN  Independent Historians: Patient and Family    HPI:    Context: Arin Lyons is a 70 y.o. female with a medical history of osteopenia who presents to the ED c/o acute groin pain.  Pain is located in the left groin/left lower quadrant of the abdomen.  Symptoms are atraumatic in onset and have been occurring for approximately 48 hours.        PHYSICAL EXAM    I have reviewed the triage vital signs and nursing notes.    ED Triage Vitals   Temp Heart Rate Resp BP SpO2   05/29/25 1713 05/29/25 1713 05/29/25 1713 05/29/25 1715 05/29/25 1713   98 °F (36.7 °C) 97 16 (!) 183/97 100 %      Temp src Heart Rate Source Patient Position BP Location FiO2 (%)   05/29/25 1713 05/29/25 1713 05/29/25 1713 05/29/25 1713 --   Oral Monitor Sitting Right arm        Physical Exam  GENERAL: alert, no acute distress  SKIN: Warm, dry  HENT: Normocephalic, atraumatic  EYES: no scleral icterus  CV: regular rhythm, regular rate  RESPIRATORY: normal effort, lungs clear  ABDOMEN: soft, nontender, nondistended  MUSCULOSKELETAL: no deformity mild tenderness to the left groin  NEURO: alert, moves all extremities, follows commands            MEDICATIONS GIVEN IN ER  Medications   sodium chloride 0.9 % flush 10 mL (has no administration in time range)   ketorolac (TORADOL) injection 15 mg (15 mg Intravenous Given 5/29/25 1833)   iopamidol (ISOVUE-300) 61 % injection 85 mL (85 mL Intravenous Given by Other 5/29/25 1816)         ORDERS PLACED DURING THIS VISIT:  Orders Placed This Encounter   Procedures    CT Abdomen Pelvis With Contrast    Comprehensive Metabolic Panel    Urinalysis With Microscopic If Indicated (No Culture) - Urine, Clean Catch    Lipase    CBC Auto Differential    Insert Peripheral IV    CBC & Differential         PROCEDURES  Procedures            PROGRESS, DATA ANALYSIS, CONSULTS, AND MEDICAL DECISION MAKING  All labs have been independently  interpreted by me.  All radiology studies have been reviewed by me. All EKG's have been independently viewed and interpreted by me.  Discussion below represents my analysis of pertinent findings related to patient's condition, differential diagnosis, treatment plan and final disposition.    Differential diagnosis includes but is not limited to strained muscle, DVT, hernia.    Clinical Scores:                                     ED Course as of 05/29/25 1957   Thu May 29, 2025   1726 Patient presents with a 3-day history of worsening left groin pain.  Patient denies any overt dysuria, vomiting, diarrhea, fever.  No falls.  Differential diagnoses include but not limited to muscular strain, UTI, diverticulitis, ovarian mass. [EE]   1801 WBC: 5.14 [EE]   1801 Hemoglobin: 13.4 [EE]   1801 Leukocytes, UA: Negative [EE]   1801 Nitrite, UA: Negative [EE]   1920 CT imaging of the abdomen independently interpreted myself shows no evidence of acute obstruction [EE]   1951 Updated patient and family at workup.  Reassuring CT scan, labs.  No clear etiology to her pain.  She does have an appointment next week to see her primary care doctor.  She understands to return here for any worsening symptoms. [EE]      ED Course User Index  [EE] Felipe Ovalle PA       MDM: 70-year-old female presenting for evaluation of left groin discomfort.  Laboratory evaluation overall unremarkable.  CT abdomen pelvis unrevealing for acute abnormality.  Suspect muscle strain.  Educated on supportive care.  Discharged to follow-up with primary care.      COMPLEXITY OF CARE  Admission was considered but after careful review of the patient's presentation, physical examination, diagnostic results, and response to treatment the patient may be safely discharged with outpatient follow-up.    Please note that portions of this document were completed with a voice recognition program.    Note Disclaimer: At [facility], we believe that sharing information  builds trust and better relationships. You are receiving this note because you recently visited [facility]. It is possible you will see health information before a provider has talked with you about it. This kind of information can be easy to misunderstand. To help you fully understand what it means for your health, we urge you to discuss this note with your provider.         Gustavo Junior MD  05/29/25 [id number]

## 2025-06-06 ENCOUNTER — OFFICE VISIT (OUTPATIENT)
Dept: INTERNAL MEDICINE | Facility: CLINIC | Age: 70
End: 2025-06-06
Payer: MEDICARE

## 2025-06-06 VITALS
SYSTOLIC BLOOD PRESSURE: 140 MMHG | DIASTOLIC BLOOD PRESSURE: 84 MMHG | HEIGHT: 67 IN | HEART RATE: 70 BPM | BODY MASS INDEX: 22.29 KG/M2 | WEIGHT: 142 LBS | OXYGEN SATURATION: 96 %

## 2025-06-06 DIAGNOSIS — E55.9 VITAMIN D DEFICIENCY: Primary | ICD-10-CM

## 2025-06-06 DIAGNOSIS — L98.9 SKIN LESION OF RIGHT LEG: ICD-10-CM

## 2025-06-06 DIAGNOSIS — R42 VERTIGO: ICD-10-CM

## 2025-06-06 DIAGNOSIS — R10.32 LEFT GROIN PAIN: ICD-10-CM

## 2025-06-06 DIAGNOSIS — Z12.31 ENCOUNTER FOR SCREENING MAMMOGRAM FOR MALIGNANT NEOPLASM OF BREAST: ICD-10-CM

## 2025-06-06 DIAGNOSIS — E78.00 HYPERCHOLESTEREMIA: Chronic | ICD-10-CM

## 2025-06-06 DIAGNOSIS — Z85.820 HISTORY OF MELANOMA: ICD-10-CM

## 2025-06-06 LAB
25(OH)D3+25(OH)D2 SERPL-MCNC: 43 NG/ML (ref 30–100)
CHOLEST SERPL-MCNC: 212 MG/DL (ref 0–200)
HDLC SERPL-MCNC: 63 MG/DL (ref 40–60)
LDLC SERPL CALC-MCNC: 133 MG/DL (ref 0–100)
TRIGL SERPL-MCNC: 93 MG/DL (ref 0–150)
TSH SERPL DL<=0.005 MIU/L-ACNC: 2.27 UIU/ML (ref 0.27–4.2)
VLDLC SERPL CALC-MCNC: 16 MG/DL (ref 5–40)

## 2025-06-21 PROBLEM — Z53.20 STATIN DECLINED: Status: ACTIVE | Noted: 2025-06-21

## 2025-06-21 PROBLEM — R10.32 LEFT GROIN PAIN: Status: ACTIVE | Noted: 2025-06-21

## 2025-06-21 PROBLEM — L98.9 SKIN LESION OF RIGHT LEG: Status: ACTIVE | Noted: 2025-06-21

## 2025-06-21 NOTE — PATIENT INSTRUCTIONS
Medicare Wellness  Personal Prevention Plan of Service     Date of Office Visit:    Encounter Provider:  NARCISA Israel  Place of Service:  [facility] PRIMARY [facility]ARE  Patient Name: Arin Lyons  :  1955    As part of the Medicare Wellness portion of your visit today, we are providing you with this personalized preventive plan of services (PPPS). This plan is based upon recommendations of the [place] Preventive Services Task Force (USPSTF) and the Advisory Committee on Immunization Practices (ACIP).    This lists the preventive care services that should be considered, and provides dates of when you are due. Items listed as completed are up-to-date and do not require any further intervention.    Health Maintenance   Topic Date Due    Pneumococcal Vaccine 50+ (2 of 2 - PCV) 2020    ANNUAL WELLNESS VISIT  2025    DXA SCAN  2025    COVID-19 Vaccine (2024- season) 2026 (Originally 2024)    INFLUENZA VACCINE  2025    LIPID PANEL  2026    MAMMOGRAM  2027    TDAP/TD VACCINES (2 - Td or Tdap) 2029    COLORECTAL CANCER SCREENING  2029    HEPATITIS C SCREENING  Completed    ZOSTER VACCINE  Completed       Orders Placed This Encounter   Procedures    Mammo Screening Digital Tomosynthesis Bilateral With CAD     Standing Status:   Future     Number of Occurrences:   1     Expected Date:   2025     Expiration Date:   2026     Reason for Exam::   Screening     Release to patient:   Routine Release [[id number]]    TSH     Release to patient:   Routine Release [[id number]]     [facility] Has the patient fasted?:   No    Vitamin D,25-Hydroxy     Release to patient:   Routine Release [[id number]]     [facility] Has the patient fasted?:   No    Lipid Panel     Release to patient:   Routine Release [[id number]]     [facility] Has the patient fasted?:   No    Ambulatory Referral to Dermatology     Referral Priority:   Routine      Referral Type:   Consultation     Referral Reason:   Specialty Services Required     Referred to Provider:   An Jaquez MD     Requested Specialty:   Dermatology     Number of Visits Requested:   1       Return in about 6 months (around 12/6/2025).

## 2025-06-21 NOTE — ASSESSMENT & PLAN NOTE
ER visit 5/29/25: Unremarkable urine test, labs and CT abd/pelvis. Monitor groin pain and report changes.

## 2025-06-21 NOTE — ASSESSMENT & PLAN NOTE
Occasional off-balance feelings, foot cramps, no numbness or tingling. Uses walking stick, no falls. Check vitamin D level due to potential link to vertigo. Evaluated by ENT in past and has received PT as well.

## 2025-06-21 NOTE — ASSESSMENT & PLAN NOTE
Spot present for about a month, itchy and scaly. Recommend hydrocortisone cream daily. Refer to Dr. Jaquez, dermatologist, due to history of melanoma.   <-- Click to add NO pertinent Family History

## 2025-06-21 NOTE — PROGRESS NOTES
Subjective   The ABCs of the Annual Wellness Visit  Medicare Wellness Visit      Arin Lyons is a 70 y.o. patient who presents for a Medicare Wellness Visit.    The following portions of the patient's history were reviewed and   updated as appropriate: allergies, current medications, past family history, past medical history, past social history, past surgical history, and problem list.    Compared to one year ago, the patient's physical   health is the same.  Compared to one year ago, the patient's mental   health is the same.    Recent Hospitalizations:  She was not admitted to the hospital during the last year.     Current Medical Providers:  Patient Care Team:  Marylou Hughes APRN as PCP - General (Internal Medicine)  Gwen Pérez MD as Consulting Physician (Obstetrics and Gynecology)    Outpatient Medications Prior to Visit   Medication Sig Dispense Refill    naproxen sodium (ALEVE) 220 MG tablet Take 1 tablet by mouth 2 (Two) Times a Day As Needed for Mild Pain. Or Advil rotation as needed      Vitamin D-Vitamin K (VITAMIN K2-VITAMIN D3 PO) Take 1 tablet by mouth Daily. 1000 IU/ 45 mcg      promethazine-dextromethorphan (PROMETHAZINE-DM) 6.25-15 MG/5ML syrup Take 5 mL by mouth 4 (Four) Times a Day As Needed for Cough. (Patient not taking: Reported on 6/6/2025) 180 mL 0     No facility-administered medications prior to visit.     No opioid medication identified on active medication list. I have reviewed chart for other potential  high risk medication/s and harmful drug interactions in the elderly.      Aspirin is not on active medication list.  Aspirin use is not indicated based on review of current medical condition/s. Risk of harm outweighs potential benefits.  .    Patient Active Problem List   Diagnosis    Carotid stenosis, asymptomatic, bilateral    Arthritis of foot    Closed nondisplaced fracture of second metatarsal bone of right foot    Closed nondisplaced fracture of third metatarsal bone  "of right foot    Vitamin D deficiency    Lumbar radiculopathy    Neck pain on left side    Vertigo    Degenerative disc disease, lumbar    TIA (transient ischemic attack)    Allergic sinusitis    Thyroid nodule    Dysequilibrium    Anxiety    Hypercholesteremia    Inflammatory spondylopathy of lumbar region    Osteopenia of left hip    Scoliosis of lumbar spine    Benign paroxysmal positional vertigo due to bilateral vestibular disorder    Lung nodule    Skin lesion of right leg    Left groin pain    Statin declined     Advance Care Planning Advance Directive is not on file.  ACP discussion was held with the patient during this visit. Patient has an advance directive (not in EMR), copy requested.            Objective   Vitals:    25 1016   BP: 140/84   BP Location: Left arm   Patient Position: Sitting   Cuff Size: Adult   Pulse: 70   SpO2: 96%   Weight: 64.4 kg (142 lb)   Height: 170.2 cm (67\")   PainSc: 3    PainLoc: Groin  Comment: left       Estimated body mass index is 22.24 kg/m² as calculated from the following:    Height as of this encounter: 170.2 cm (67\").    Weight as of this encounter: 64.4 kg (142 lb).    BMI is within normal parameters. No other follow-up for BMI required.           Does the patient have evidence of cognitive impairment? No  Lab Results   Component Value Date    CHLPL 212 (H) 2025    TRIG 93 2025    HDL 63 (H) 2025     (H) 2025    VLDL 16 2025                                                                                                Health  Risk Assessment    Smoking Status:  Social History     Tobacco Use   Smoking Status Former    Current packs/day: 0.00    Average packs/day: 0.3 packs/day for 2.0 years (0.5 ttl pk-yrs)    Types: Cigarettes    Start date: 1968    Quit date: 1970    Years since quittin.5    Passive exposure: Past   Smokeless Tobacco Never   Tobacco Comments    smoked when I was an irresponsible young adult many " years ago!     Alcohol Consumption:  Social History     Substance and Sexual Activity   Alcohol Use Yes    Alcohol/week: 2.0 standard drinks of alcohol    Types: 2 Glasses of wine per week    Comment: social drinks       Fall Risk Screen  MOISÉS Fall Risk Assessment was completed, and patient is at LOW risk for falls.Assessment completed on:2025    Depression Screening   Little interest or pleasure in doing things? Not at all   Feeling down, depressed, or hopeless? Not at all   PHQ-2 Total Score 0      Health Habits and Functional and Cognitive Screenin/1/2025    11:35 AM   Functional & Cognitive Status   Do you have difficulty preparing food and eating? No   Do you have difficulty bathing yourself, getting dressed or grooming yourself? No   Do you have difficulty using the toilet? No   Do you have difficulty moving around from place to place? No   Do you have trouble with steps or getting out of a bed or a chair? No   Current Diet Well Balanced Diet   Dental Exam Not up to date   Eye Exam Up to date   Exercise (times per week) 7 times per week   Current Exercises Include Gardening;Home Exercise Program (TV, Computer, Etc.);House Cleaning;Light Weights;Walking;Weightlifting;Yard Work   Do you need help using the phone?  No   Are you deaf or do you have serious difficulty hearing?  No   Do you need help to go to places out of walking distance? No   Do you need help shopping? No   Do you need help preparing meals?  No   Do you need help with housework?  No   Do you need help with laundry? No   Do you need help taking your medications? No   Do you need help managing money? No   Do you ever drive or ride in a car without wearing a seat belt? No   Have you felt unusual stress, anger or loneliness in the last month? No   Who do you live with? Spouse   If you need help, do you have trouble finding someone available to you? No   Have you been bothered in the last four weeks by sexual problems? No   Do you have  difficulty concentrating, remembering or making decisions? No           Age-appropriate Screening Schedule:  Refer to the list below for future screening recommendations based on patient's age, sex and/or medical conditions. Orders for these recommended tests are listed in the plan section. The patient has been provided with a written plan.    Health Maintenance List  Health Maintenance   Topic Date Due    Pneumococcal Vaccine 50+ (2 of 2 - PCV) 08/20/2020    ANNUAL WELLNESS VISIT  06/03/2025    DXA SCAN  09/18/2025    COVID-19 Vaccine (6 - 2024-25 season) 06/05/2026 (Originally 9/1/2024)    INFLUENZA VACCINE  07/01/2025    LIPID PANEL  06/06/2026    MAMMOGRAM  06/12/2027    TDAP/TD VACCINES (2 - Td or Tdap) 08/20/2029    COLORECTAL CANCER SCREENING  11/06/2029    HEPATITIS C SCREENING  Completed    ZOSTER VACCINE  Completed                                                                                                                                                CMS Preventative Services Quick Reference  Risk Factors Identified During Encounter  Immunizations Discussed/Encouraged: Prevnar 20 (Pneumococcal 20-valent conjugate)  Bone density scan    The above risks/problems have been discussed with the patient.  Pertinent information has been shared with the patient in the After Visit Summary.  An After Visit Summary and PPPS were made available to the patient.    Follow Up:   Next Medicare Wellness visit to be scheduled in 1 year.         Additional E&M Note during same encounter follows:  Patient has additional, significant, and separately identifiable condition(s)/problem(s) that require work above and beyond the Medicare Wellness Visit     Chief Complaint  Medicare Wellness-subsequent and Groin Pain    Subjective    Groin Pain  Pertinent negatives include no abdominal pain.     Jess is also being seen today for additional medical problem/s.       The patient presents for evaluation of left groin pain, vertigo,  "and a spot on her leg.    She sought emergency care on 05/29/2025 due to persistent left groin pain, initially experienced on the preceding Tuesday. The pain subsided by Wednesday, allowing physical activities, but recurred and intensified on Thursday. She reported a sensation of obstruction during urination without hematuria. History of fibroids.     She c/o left shoulder pain without paresthesias. Right hip pain when sitting, no sciatica. Managed groin pain with ibuprofen, not required recently. Regular exercise routine without strain.    No shortness of breath, heartburn, or indigestion. Increased belching without dietary changes.    Frequent foot cramps, no numbness or tingling. Occasional imbalance, uses walking stick. Vertigo well-controlled. Supplements diet with vitamin D and calcium.    Spot on leg present for about a month, different from other spots, itchy and scaly. No recent dermatologist visit. History of melanoma 35 years ago on left arm, treated surgically.    Had a mammogram last year. Not interested in further COVID-19 boosters or pneumonia vaccines.  Review of Systems   HENT:  Positive for congestion, postnasal drip and rhinorrhea.    Respiratory:  Negative for cough, chest tightness and shortness of breath.    Cardiovascular:  Negative for chest pain, palpitations and leg swelling.   Gastrointestinal:  Negative for abdominal pain.   Musculoskeletal:  Positive for arthralgias.   Skin:         +lesion          Objective   Vital Signs:  /84 (BP Location: Left arm, Patient Position: Sitting, Cuff Size: Adult)   Pulse 70   Ht 170.2 cm (67\")   Wt 64.4 kg (142 lb)   SpO2 96%   BMI 22.24 kg/m²   Physical Exam  Constitutional:       Appearance: She is well-developed. She is not ill-appearing.   HENT:      Head: Normocephalic.      Right Ear: Hearing, tympanic membrane and external ear normal.      Left Ear: Hearing, tympanic membrane and external ear normal.      Nose: Nose normal. No nasal " deformity, mucosal edema or rhinorrhea.      Right Sinus: No maxillary sinus tenderness or frontal sinus tenderness.      Left Sinus: No maxillary sinus tenderness or frontal sinus tenderness.      Mouth/Throat:      Dentition: Normal dentition.   Eyes:      General: Lids are normal.         Right eye: No discharge.         Left eye: No discharge.      Conjunctiva/sclera: Conjunctivae normal.      Right eye: No exudate.     Left eye: No exudate.  Neck:      Thyroid: No thyroid mass or thyromegaly.      Vascular: No carotid bruit.      Trachea: Trachea normal.   Cardiovascular:      Rate and Rhythm: Regular rhythm.      Pulses: Normal pulses.      Heart sounds: Normal heart sounds. No murmur heard.  Pulmonary:      Effort: No respiratory distress.      Breath sounds: Normal breath sounds. No decreased breath sounds, wheezing, rhonchi or rales.   Abdominal:      General: Bowel sounds are normal.      Palpations: Abdomen is soft.      Tenderness: There is no abdominal tenderness.   Musculoskeletal:      Cervical back: Normal range of motion. No edema.      Right hip: No tenderness. Normal range of motion.      Left hip: No tenderness. Normal range of motion.   Lymphadenopathy:      Head:      Right side of head: No submental, submandibular, tonsillar, preauricular, posterior auricular or occipital adenopathy.      Left side of head: No submental, submandibular, tonsillar, preauricular, posterior auricular or occipital adenopathy.   Skin:     General: Skin is warm and dry.      Nails: There is no clubbing.      Comments: Sawyer 3 cm scaly papule on right lower anterior leg without acute inflammation and/or irritation   Neurological:      Mental Status: She is alert.   Psychiatric:         Behavior: Behavior is cooperative.               The following data was reviewed by: NARCISA Israel on 06/06/2025:  Data reviewed : Recent hospitalization notes ER notes 5/29/25  Common labs          5/29/2025    17:34 6/6/2025     11:05   Common Labs   Glucose 92     BUN 19.0     Creatinine 0.78     Sodium 136     Potassium 3.6     Chloride 99     Calcium 9.4     Albumin 4.6     Total Bilirubin 0.5     Alkaline Phosphatase 89     AST (SGOT) 23     ALT (SGPT) 14     WBC 5.14     Hemoglobin 13.4     Hematocrit 42.0     Platelets 283     Total Cholesterol  212    Triglycerides  93    HDL Cholesterol  63    LDL Cholesterol   133        Results  Laboratory Studies  Urine test: no blood or bacteria. Normal blood sugar, kidney and liver function, CBC, and pancreatic enzyme.    Imaging  CT scan: normal spleen, pancreas, adrenal glands, kidneys, bladder (mild urinary bladder wall thickening, no inflammation), calcified fibroid, mild to moderate colonic stool (mild to moderate constipation), diverticula (no diverticulitis), normal appendix, no bowel obstruction or fluid, no enlarged lymph nodes, slightly dilated biliary duct, scoliosis, multilevel spondylosis, and arthritic changes throughout spine.           Assessment and Plan Additional age appropriate preventative wellness advice topics were discussed during today's preventative wellness exam(some topics already addressed during AWV portion of the note above):    Physical Activity: Advised cardiovascular activity 150 minutes per week as tolerated. (example brisk walk for 30 minutes, 5 days a week).     Nutrition: Discussed nutrition plan with patient. Information shared in after visit summary. Goal is for a well balanced diet to enhance overall health.       Health maintenance.  Order mammogram. Conduct blood work to assess thyroid function, vitamin D levels, and cholesterol levels.     Diagnoses and all orders for this visit:    1. Vitamin D deficiency (Primary)  Assessment & Plan:  She is currently managed on Vitamin D supplement, recheck level    Orders:  -     Vitamin D,25-Hydroxy    2. Hypercholesteremia  Assessment & Plan:  She has declined statin therapy; continue a low-fat, low-cholesterol  diet-recheck lipid panel.    Orders:  -     TSH  -     Lipid Panel    3. Skin lesion of right leg  Assessment & Plan:  Spot present for about a month, itchy and scaly. Recommend hydrocortisone cream daily. Refer to Dr. Jaquez, dermatologist, due to history of melanoma.    Orders:  -     Ambulatory Referral to Dermatology    4. Left groin pain  Assessment & Plan:  ER visit 5/29/25: Unremarkable urine test, labs and CT abd/pelvis. Monitor groin pain and report changes.      5. Vertigo  Assessment & Plan:  Occasional off-balance feelings, foot cramps, no numbness or tingling. Uses walking stick, no falls. Check vitamin D level due to potential link to vertigo. Evaluated by ENT in past and has received PT as well.      6. History of melanoma  -     Ambulatory Referral to Dermatology    7. Encounter for screening mammogram for malignant neoplasm of breast  -     Mammo Screening Digital Tomosynthesis Bilateral With CAD; Future  -     Mammo Screening Digital Tomosynthesis Bilateral With CAD             Follow Up   Return in about 6 months (around 12/6/2025).  Patient was given instructions and counseling regarding her condition or for health maintenance advice. Please see specific information pulled into the AVS if appropriate.  Patient or patient representative verbalized consent for the use of Ambient Listening during the visit with  NARCISA Israel for chart documentation. 6/21/2025  12:49 EDT